# Patient Record
Sex: MALE | Race: WHITE | Employment: OTHER | URBAN - NONMETROPOLITAN AREA
[De-identification: names, ages, dates, MRNs, and addresses within clinical notes are randomized per-mention and may not be internally consistent; named-entity substitution may affect disease eponyms.]

---

## 2018-08-15 ENCOUNTER — HOSPITAL ENCOUNTER (EMERGENCY)
Age: 63
Discharge: HOME | End: 2018-08-15
Attending: EMERGENCY MEDICINE

## 2018-08-15 VITALS
OXYGEN SATURATION: 95 % | SYSTOLIC BLOOD PRESSURE: 190 MMHG | HEIGHT: 68 IN | BODY MASS INDEX: 33.34 KG/M2 | WEIGHT: 220 LBS | HEART RATE: 82 BPM | DIASTOLIC BLOOD PRESSURE: 98 MMHG | RESPIRATION RATE: 14 BRPM | TEMPERATURE: 97.9 F

## 2018-08-15 DIAGNOSIS — L02.91 ABSCESS: Primary | ICD-10-CM

## 2018-08-15 PROCEDURE — 99283 EMERGENCY DEPT VISIT LOW MDM: CPT

## 2018-08-15 PROCEDURE — 87070 CULTURE OTHR SPECIMN AEROBIC: CPT

## 2018-08-15 PROCEDURE — 87205 SMEAR GRAM STAIN: CPT

## 2018-08-15 PROCEDURE — 87077 CULTURE AEROBIC IDENTIFY: CPT

## 2018-08-15 PROCEDURE — 87186 SC STD MICRODIL/AGAR DIL: CPT

## 2018-08-15 PROCEDURE — 4500000023 HC ED LEVEL 3 PROCEDURE

## 2018-08-15 RX ORDER — IBUPROFEN 400 MG/1
400 TABLET ORAL EVERY 6 HOURS PRN
Qty: 30 TABLET | Refills: 0 | Status: ON HOLD | OUTPATIENT
Start: 2018-08-15 | End: 2021-10-19

## 2018-08-15 RX ORDER — SULFAMETHOXAZOLE AND TRIMETHOPRIM 800; 160 MG/1; MG/1
1 TABLET ORAL 2 TIMES DAILY
Qty: 20 TABLET | Refills: 0 | Status: SHIPPED | OUTPATIENT
Start: 2018-08-15 | End: 2018-08-25

## 2018-08-15 ASSESSMENT — PAIN SCALES - GENERAL: PAINLEVEL_OUTOF10: 10

## 2018-08-15 ASSESSMENT — PAIN DESCRIPTION - LOCATION: LOCATION: ABDOMEN

## 2018-08-15 ASSESSMENT — PAIN DESCRIPTION - ORIENTATION: ORIENTATION: LOWER

## 2018-08-15 ASSESSMENT — PAIN DESCRIPTION - DESCRIPTORS: DESCRIPTORS: SHARP

## 2018-08-15 ASSESSMENT — PAIN DESCRIPTION - PAIN TYPE: TYPE: ACUTE PAIN

## 2018-08-15 NOTE — ED PROVIDER NOTES
Emergency Physician Note    Chief Complaint  Abscess (Abscess and cellulitis on lower left abdomin)       History of Present Illness  Tyra Cooper is a 59 y.o. male who presents to the ED for abscess and cellulitis. Patient reports the last few days he's had some pain in the left groin. He's had abscesses or twice in the past.  He denies any fevers, chills or sweats. He also denies any injury to the area. 10 systems reviewed, pertinent positives per HPI otherwise noted to be negative    I have reviewed the following from the nursing documentation:      Prior to Admission medications    Medication Sig Start Date End Date Taking? Authorizing Provider   aspirin 81 MG chewable tablet Take 81 mg by mouth daily. Historical Provider, MD   rosuvastatin (CRESTOR) 40 MG tablet Take 40 mg by mouth every evening. Historical Provider, MD   lisinopril (PRINIVIL;ZESTRIL) 10 MG tablet Take 10 mg by mouth daily. Historical Provider, MD   metoprolol (TOPROL-XL) 25 MG XL tablet Take 25 mg by mouth daily. Historical Provider, MD       Allergies as of 08/15/2018    (No Known Allergies)       Past Medical History:   Diagnosis Date    Hyperlipidemia     Hypertension         Surgical History:   Past Surgical History:   Procedure Laterality Date    ARTERIAL BYPASS SURGRY      triple in 2009        Family History:  History reviewed. No pertinent family history. Social History     Social History    Marital status:      Spouse name: N/A    Number of children: N/A    Years of education: N/A     Occupational History    Not on file. Social History Main Topics    Smoking status: Current Every Day Smoker     Packs/day: 1.50     Types: Cigarettes    Smokeless tobacco: Not on file    Alcohol use No    Drug use: No    Sexual activity: Not on file     Other Topics Concern    Not on file     Social History Narrative    No narrative on file       Nursing notes reviewed.     ED Triage Vitals [08/15/18 Also, there is no evidence or peritonitis, sepsis, or toxicity. [place] Tristan and I have discussed the diagnosis and risks, and we agree with discharging home to follow-up with their primary doctor. We also discussed returning to the Emergency Department immediately if new or worsening symptoms occur. We have discussed the symptoms which are most concerning (e.g., changing or worsening pain, fever, numbness, weakness, cool or painful digits) that necessitate immediate return. Final Impression    1. Abscess        Discharge Vital Signs:  Blood pressure (!) 190/98, pulse 82, temperature 97.9 °F (36.6 °C), temperature source Oral, resp. rate 14, height 5' 8\" (1.727 m), weight 220 lb (99.8 kg), SpO2 95 %. Patient was given scripts for the following medications. I counseled patient how to take these medications. Discharge Medication List as of 8/15/2018  9:28 AM      START taking these medications    Details   sulfamethoxazole-trimethoprim (BACTRIM DS) 800-160 MG per tablet Take 1 tablet by mouth 2 times daily for 10 days, Disp-20 tablet, R-0Print      ibuprofen (ADVIL;MOTRIN) 400 MG tablet Take 1 tablet by mouth every 6 hours as needed for Pain, Disp-30 tablet, R-0Print             Disposition  Pt is in good condition upon Discharge to home. This chart was generated using the [address] [place] [address] [address] dictation system. I created this record but it may contain dictation errors.           Edvin Vasquez MD  08/15/18 3410

## 2018-08-18 LAB
GRAM STAIN RESULT: ABNORMAL
ORGANISM: ABNORMAL
ORGANISM: ABNORMAL
WOUND/ABSCESS: ABNORMAL

## 2021-10-01 ENCOUNTER — HOSPITAL ENCOUNTER (OUTPATIENT)
Dept: VASCULAR LAB | Age: 66
Discharge: HOME | End: 2021-10-01
Payer: MEDICARE

## 2021-10-01 DIAGNOSIS — I73.9 CLAUDICATION OF BOTH LOWER EXTREMITIES (HCC): ICD-10-CM

## 2021-10-01 PROCEDURE — 93923 UPR/LXTR ART STDY 3+ LVLS: CPT

## 2021-10-15 ENCOUNTER — OFFICE VISIT (OUTPATIENT)
Dept: VASCULAR SURGERY | Age: 66
End: 2021-10-15
Payer: MEDICARE

## 2021-10-15 VITALS
HEIGHT: 68 IN | DIASTOLIC BLOOD PRESSURE: 82 MMHG | SYSTOLIC BLOOD PRESSURE: 170 MMHG | WEIGHT: 226 LBS | BODY MASS INDEX: 34.25 KG/M2

## 2021-10-15 DIAGNOSIS — I73.9 SEVERE CLAUDICATION (HCC): Primary | ICD-10-CM

## 2021-10-15 PROCEDURE — G8427 DOCREV CUR MEDS BY ELIG CLIN: HCPCS | Performed by: SURGERY

## 2021-10-15 PROCEDURE — 99203 OFFICE O/P NEW LOW 30 MIN: CPT | Performed by: SURGERY

## 2021-10-15 PROCEDURE — G8484 FLU IMMUNIZE NO ADMIN: HCPCS | Performed by: SURGERY

## 2021-10-15 PROCEDURE — G8417 CALC BMI ABV UP PARAM F/U: HCPCS | Performed by: SURGERY

## 2021-10-16 NOTE — PROGRESS NOTES
Outpatient Consultation / H&P    Date of Consultation:  10/15/2021    PCP:  MARCEL Stafford - CNP     Referring Provider:  Alison Guadalupe     Chief Complaint:   Chief Complaint   Patient presents with    Other     patient is coming in regarding pain in both legs. ref by Alison ROD/angelica        History of Present Illness: We are asked to see this patient in consultation by Alison Guadalupe regarding claudication. Miguel Angel Palomo is a 73 y.o. male who reports long standing bilateral leg pain with ambulation which has gotten progressively worse. He reports can only walk about 200 ft before having to stop due to pain/tightness in bilateral calves. Left worse than right. He is a 2ppd smoker. He underwent CABG 2009. Past Medical History:  Past Medical History:   Diagnosis Date    Hyperlipidemia     Hypertension        Past Surgical History:  Past Surgical History:   Procedure Laterality Date    ARTERIAL BYPASS SURGRY      triple in 2009       Home Medications:   Prior to Admission medications    Medication Sig Start Date End Date Taking? Authorizing Provider   ibuprofen (ADVIL;MOTRIN) 400 MG tablet Take 1 tablet by mouth every 6 hours as needed for Pain 8/15/18  Yes Tevin Dunne MD   aspirin 81 MG chewable tablet Take 81 mg by mouth daily. Yes Historical Provider, MD        Allergies:  Patient has no known allergies.       Social History:      Social History     Socioeconomic History    Marital status:      Spouse name: Not on file    Number of children: Not on file    Years of education: Not on file    Highest education level: Not on file   Occupational History    Not on file   Tobacco Use    Smoking status: Current Every Day Smoker     Packs/day: 2.00     Types: Cigarettes    Smokeless tobacco: Never Used   Substance and Sexual Activity    Alcohol use: No    Drug use: No    Sexual activity: Not on file   Other Topics Concern    Not on file   Social History Narrative    Not on file     Social Determinants of Health     Financial Resource Strain:     Difficulty of Paying Living Expenses:    Food Insecurity:     Worried About Running Out of Food in the Last Year:     [address] in the Last Year:    Transportation Needs:     Lack of Transportation (Medical):  Lack of Transportation (Non-Medical):    Physical Activity:     Days of Exercise per Week:     Minutes of Exercise per Session:    Stress:     Feeling of Stress :    Social Connections:     Frequency of Communication with Friends and Family:     Frequency of Social Gatherings with Friends and Family:     Attends [employer] Services:     Active Member of Clubs or Organizations:     Attends Club or Organization Meetings:     Marital Status:    Intimate Partner Violence:     Fear of Current or Ex-Partner:     Emotionally Abused:     Physically Abused:     Sexually Abused:        Family History:    History reviewed. No pertinent family history. Review of Systems:  A 14 point review of systems was completed. Pertinent positives identified in the HPI, all other review of systems negative. Physical Examination:    BP (!) 170/82 (Site: Left Upper Arm)   Ht 5' 8\" (1.727 m)   Wt 226 lb (102.5 kg)   BMI 34.36 kg/m²     Weight: 226 lb (102.5 kg)       General appearance: NAD  Eyes: PERRLA  Neck: no JVD, no lymphadenopathy. Respiratory: effort is unlabored, no crackles, wheezes or rubs. Cardiovascular: regular, no murmur. No carotid bruits. No edema or varicosities. Pulses:    femoral DP PT   RIGHT 2 doppler doppler   LEFT 2 doppler doppler   GI: abdomen soft, nondistended, no organomegaly. Musculoskeletal: strength and tone normal.  Extremities: warm and pink. Skin: no dermatitis or ulceration. Neuro/psychiatric: grossly intact. MEDICAL DECISION MAKING/TESTING      Lower Extremity arterial exam:       Right Impression   1. The right ankle/brachial index is 0.65.    2. The presence of multiphasic waveforms at the common femoral artery level   suggests no aorto-iliac inflow disease. 3. Pulse volume recordings reveal mildly abnormal waveforms at the high thigh,   low thigh and calf with moderately abnormal waveforms involving the ankle. 4. Continuous wave Doppler of the common femoral and popliteal arteries reveal   multiphasic waveforms with abnormal monophasic waveforms involving the   posterior tibial and dorsalis pedis arteries. Left Impression   1. The left ankle/brachial index is 0.49.   2. The presence of triphasic waveforms at the common femoral artery level   suggests no aorto-iliac inflow disease. 3. Pulse volume recordings reveal mildly abnormal waveforms involving the high   and low thigh with moderately abnormal waveforms involving the calf and ankle. 4. Continuous wave Doppler of the common femoral artery demonstrates   multiphasic flow. The popliteal, dorsalis pedis and posterior tibial arteries   demonstrate abnormal monophasic flow. 5. There are no prior studies for comparison.       Conclusions        Summary        CHANELLE's indicate moderate bilateral arterial occlusive disease.    Pressures and waveforms suggest bilateral superficial femoral artery    stenosis/occlusion.        Signature        ------------------------------------------------------------------    Electronically signed by Yvette Mcfarlane MD (Interpreting    [id number]) on 10/01/2021 at 04:25 PM           Assessment:      Diagnosis Orders   1. Severe claudication (HCC)       Probable bilateral superficial femoral artery occlusions. Recommendations/Plan: Aortogram with BLE angiogram, possible left lower extremity intervention. Patient has lifestyle-limiting claudication of Bila lower extremity. I discussed the natural history of PAD with patient. I reviewed the modifiable risk factors which include smoking, hypertension, dyslipidemia, and diabetes.  I have discussed the options for treatment

## 2021-10-18 ENCOUNTER — TELEPHONE (OUTPATIENT)
Dept: VASCULAR SURGERY | Age: 66
End: 2021-10-18

## 2021-10-19 ENCOUNTER — APPOINTMENT (OUTPATIENT)
Dept: VASCULAR LAB | Age: 66
End: 2021-10-19
Attending: SURGERY
Payer: MEDICARE

## 2021-10-19 ENCOUNTER — HOSPITAL ENCOUNTER (OUTPATIENT)
Dept: CARDIAC CATH/INVASIVE PROCEDURES | Age: 66
Discharge: HOME | End: 2021-10-19
Attending: SURGERY | Admitting: SURGERY
Payer: MEDICARE

## 2021-10-19 VITALS — HEIGHT: 68 IN | WEIGHT: 224.3 LBS | BODY MASS INDEX: 33.99 KG/M2

## 2021-10-19 LAB
ANION GAP SERPL CALCULATED.3IONS-SCNC: 9 MMOL/L (ref 3–16)
BUN BLDV-MCNC: 19 MG/DL (ref 7–20)
CALCIUM SERPL-MCNC: 9.5 MG/DL (ref 8.3–10.6)
CHLORIDE BLD-SCNC: 101 MMOL/L (ref 99–110)
CO2: 30 MMOL/L (ref 21–32)
CREAT SERPL-MCNC: 0.8 MG/DL (ref 0.8–1.3)
EKG ATRIAL RATE: 55 BPM
EKG DIAGNOSIS: NORMAL
EKG P AXIS: 24 DEGREES
EKG P-R INTERVAL: 192 MS
EKG Q-T INTERVAL: 434 MS
EKG QRS DURATION: 114 MS
EKG QTC CALCULATION (BAZETT): 415 MS
EKG R AXIS: -34 DEGREES
EKG T AXIS: 136 DEGREES
EKG VENTRICULAR RATE: 55 BPM
GFR AFRICAN AMERICAN: >60
GFR NON-AFRICAN AMERICAN: >60
GLUCOSE BLD-MCNC: 159 MG/DL (ref 70–99)
HCT VFR BLD CALC: 50.7 % (ref 40.5–52.5)
HEMOGLOBIN: 17.2 G/DL (ref 13.5–17.5)
MCH RBC QN AUTO: 32.2 PG (ref 26–34)
MCHC RBC AUTO-ENTMCNC: 33.9 G/DL (ref 31–36)
MCV RBC AUTO: 95 FL (ref 80–100)
PDW BLD-RTO: 14.7 % (ref 12.4–15.4)
PLATELET # BLD: 236 K/UL (ref 135–450)
PMV BLD AUTO: 6.7 FL (ref 5–10.5)
POTASSIUM SERPL-SCNC: 4.5 MMOL/L (ref 3.5–5.1)
RBC # BLD: 5.34 M/UL (ref 4.2–5.9)
SARS-COV-2: NOT DETECTED
SODIUM BLD-SCNC: 140 MMOL/L (ref 136–145)
WBC # BLD: 7.4 K/UL (ref 4–11)

## 2021-10-19 PROCEDURE — 75625 CONTRAST EXAM ABDOMINL AORTA: CPT | Performed by: SURGERY

## 2021-10-19 PROCEDURE — 93010 ELECTROCARDIOGRAM REPORT: CPT | Performed by: INTERNAL MEDICINE

## 2021-10-19 PROCEDURE — 93971 EXTREMITY STUDY: CPT

## 2021-10-19 PROCEDURE — 2500000003 HC RX 250 WO HCPCS

## 2021-10-19 PROCEDURE — 75625 CONTRAST EXAM ABDOMINL AORTA: CPT

## 2021-10-19 PROCEDURE — C1769 GUIDE WIRE: HCPCS

## 2021-10-19 PROCEDURE — 36200 PLACE CATHETER IN AORTA: CPT | Performed by: SURGERY

## 2021-10-19 PROCEDURE — 2709999900 HC NON-CHARGEABLE SUPPLY

## 2021-10-19 PROCEDURE — 2709999900 HC NON-CHARGEABLE SUPPLY: Performed by: SURGERY

## 2021-10-19 PROCEDURE — 75716 ARTERY X-RAYS ARMS/LEGS: CPT | Performed by: SURGERY

## 2021-10-19 PROCEDURE — C1760 CLOSURE DEV, VASC: HCPCS

## 2021-10-19 PROCEDURE — 6360000002 HC RX W HCPCS

## 2021-10-19 PROCEDURE — 93005 ELECTROCARDIOGRAM TRACING: CPT | Performed by: SURGERY

## 2021-10-19 PROCEDURE — U0003 INFECTIOUS AGENT DETECTION BY NUCLEIC ACID (DNA OR RNA); SEVERE ACUTE RESPIRATORY SYNDROME CORONAVIRUS 2 (SARS-COV-2) (CORONAVIRUS DISEASE [COVID-19]), AMPLIFIED PROBE TECHNIQUE, MAKING USE OF HIGH THROUGHPUT TECHNOLOGIES AS DESCRIBED BY CMS-2020-01-R: HCPCS

## 2021-10-19 PROCEDURE — 36200 PLACE CATHETER IN AORTA: CPT

## 2021-10-19 PROCEDURE — 80048 BASIC METABOLIC PNL TOTAL CA: CPT

## 2021-10-19 PROCEDURE — 99152 MOD SED SAME PHYS/QHP 5/>YRS: CPT | Performed by: SURGERY

## 2021-10-19 PROCEDURE — G0269 OCCLUSIVE DEVICE IN VEIN ART: HCPCS

## 2021-10-19 PROCEDURE — 85027 COMPLETE CBC AUTOMATED: CPT

## 2021-10-19 PROCEDURE — 75716 ARTERY X-RAYS ARMS/LEGS: CPT

## 2021-10-19 PROCEDURE — 76937 US GUIDE VASCULAR ACCESS: CPT | Performed by: SURGERY

## 2021-10-19 PROCEDURE — U0005 INFEC AGEN DETEC AMPLI PROBE: HCPCS

## 2021-10-19 RX ORDER — SODIUM CHLORIDE 9 MG/ML
INJECTION, SOLUTION INTRAVENOUS CONTINUOUS
Status: DISCONTINUED | OUTPATIENT
Start: 2021-10-19 | End: 2021-10-19 | Stop reason: HOSPADM

## 2021-10-19 RX ORDER — MIDAZOLAM HYDROCHLORIDE 5 MG/ML
INJECTION INTRAMUSCULAR; INTRAVENOUS
Status: COMPLETED | OUTPATIENT
Start: 2021-10-19 | End: 2021-10-19

## 2021-10-19 RX ORDER — FENTANYL CITRATE 50 UG/ML
INJECTION, SOLUTION INTRAMUSCULAR; INTRAVENOUS
Status: COMPLETED | OUTPATIENT
Start: 2021-10-19 | End: 2021-10-19

## 2021-10-19 RX ADMIN — FENTANYL CITRATE 50 MCG: 50 INJECTION, SOLUTION INTRAMUSCULAR; INTRAVENOUS at 11:37

## 2021-10-19 RX ADMIN — MIDAZOLAM HYDROCHLORIDE 2 MG: 5 INJECTION INTRAMUSCULAR; INTRAVENOUS at 11:37

## 2021-10-19 NOTE — H&P
History and Physical / Pre-Sedation Assessment    Patient:  Rika Armenta  :   1955     Intended Procedure: Aortogram with BLE angiogram possible LLE intervention      HPI: Severe bilateral claudication  Nurses notes reviewed and agreed. Medications reviewed  Allergies:   Patient has no known allergies. Physical Exam:   CURRENT VITALS:  Ht 5' 8\" (1.727 m)   Wt 224 lb 4.8 oz (101.7 kg)   BMI 34.10 kg/m²   Airway:Normal  Cardiac:Normal  Pulmonary:Normal  Abdomen:Normal        Pre-Procedure Assessment/Plan:  ASA 2 - Patient with mild systemic disease with no functional limitations    Mallampati Airway Assessment:  Mallampati Class II - (soft palate, fauces & uvula are visible)    Level of Sedation Plan: Moderate sedation    Post Procedure plan: Return to same level of care    I assessed the patient and find that the patient is in satisfactory condition to proceed with the planned procedure and sedation plan. I have explained the risk, benefits, and alternatives to the procedure. The patient understands and agrees to proceed.   Yes    Taylor Reddy

## 2021-10-19 NOTE — OP NOTE
[place] [address], [address] [facility] [address]                                OPERATIVE REPORT    PATIENT NAME: Nora Anglin                   :        1955  MED REC NO:   [id number]                          ROOM:  ACCOUNT NO:   [de-identified]                           ADMIT DATE: 10/19/2021  PROVIDER:     Temo Webb MD    DATE OF PROCEDURE:  10/19/2021    ANGIOGRAM PROCEDURE REPORT    PREOPERATIVE DIAGNOSIS:  Severe bilateral lower extremity claudication. POSTOPERATIVE DIAGNOSIS:  Severe bilateral lower extremity claudication. PROCEDURES PERFORMED:  1. Ultrasound-guided right femoral artery access. 2.  Insertion of catheter in the aorta. 3.  Abdominal aortogram.  4.  Bilateral lower extremity angiograms. SURGEON:  Temo Webb MD    ANESTHESIA:  Local with moderate monitored sedation. INDICATIONS:  The patient is a 61-year-old male with longstanding  worsening claudication, now very disabling. The patient is brought to  the angio suite at this time to undergo angiography with possible  intervention. PROCEDURE:  The patient was brought to the angio suite, placed in the  supine position. Under my direct supervision Versed and fentanyl were  administered for moderate sedation. The patient was monitored by an  independent trained nurse observer using continuous blood pressure, EKG  and pulse oximetry. I spent approximately 20 minutes face-to-face with  the patient providing and directing sedation. The right femoral region  was prepped and draped in sterile fashion. An ultrasound was used to  identify the right common femoral bifurcation. The common femoral  artery was noted be patent and pulsatile. The common femoral artery was  accessed in a retrograde fashion and 5-Micronesian introducer sheath was  placed. A copy of the ultrasound image was saved and placed in the  patient's record.   Through the introducer sheath, a Bentson wire and a  modified hook catheter were advanced into the abdominal aorta. Abdominal aortogram was then obtained. The catheter was then pulled on  just above the aortic bifurcation. Oblique iliofemoral angiograms were  obtained. Using a bolus shree technique the bilateral lower extremity  angiography was then performed. The catheter was then removed over a  Bentson wire. Retrograde right femoral angiogram was performed and a  5-Honduran Mynx closure device was placed deployed in standard fashion  achieving excellent hemostasis. Estimated blood loss throughout the  procedure was minimal.  The patient was transferred to the recovery area  in stable condition. ANGIOGRAPHIC FINDINGS:  The abdominal angiogram demonstrates patent  celiac superior mesenteric and bilateral renal arteries. There is some  infrarenal atherosclerotic disease and what appears to be a small  abdominal aortic aneurysm. Bilateral iliac arteries are patent, but  diseased. On the right, the common femoral artery and deep femoral  artery are patent. There is a flush occlusion of the superficial  femoral artery, which reconstitutes via profunda collaterals below the  adductor canal.  There is two-vessel runoff below the knee. On the  right side, the popliteal artery that is patent appears very irregular. On the left side, the common femoral artery is patent. The deep femoral  artery is patent. There is patent proximal superficial femoral artery  with multiple areas of high-grade stenosis and an abrupt occlusion below  the adductor canal.  There is reconstitution of below-knee popliteal  segment and then runoff into the anterior tibial artery. The posterior  tibial artery also reconstitutes distally via collaterals and then fills  antegrade and retrograde. 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   This will be studied further in the recovery  area with duplex ultrasonography. At the conclusion of this procedure,  the patient was transferred to the recovery area in stable condition  after removal of the catheters and wires and placement of a Mynx closure  device.         Kathya Riggs MD    D: 10/19/2021 13:04:48       T: 10/19/2021 13:07:09     [email or web address]  Job#: [id number]     Doc#: [id number]    CC:

## 2021-10-20 NOTE — PROGRESS NOTES
[facility]   Cardiovascular Evaluation    PATIENT: Jessie Guzmán  DATE: 10/21/2021  MRN: <[id number]>  CSN: [id number]  : 1955      Primary Care Doctor: MARCEL Rucker CNP  Reason for evaluation:   New Patient  abnormal EKG    Subjective:   History of present illness on initial date of evaluation:   Jessie Guzmán is a 73 y.o. patient who presents as a new patient referral from Dr. Cecilia Sanchez for an abnormal EKG and preoperative cardiac risk assessment for vascular surgery with Dr. Cecilia Sanchez 10/24/2021. Patient has a past medical history of coronary artery disease, hypertension, and hyperlipidemia. Noted in patient's chart that he had CABG surgery years ago but no records available. On 10/19/2021 patient underwent Aortogram with BLE angio which showed patient will need bypass surgery. Today he states his CABG surgery was possible in  or . At the time of his MI he has having chest pain which felt like a pressure which he has not had since. He states he has not been following with cardiology due to a lack of insurance. He is currently a daily smoker and had tried chantix in the past. He states he is not active due to his pain in his legs. Patient currently denies any weight gain, edema, palpitations, chest pain, shortness of breath, dizziness, and syncope. Patient Active Problem List   Diagnosis    Severe claudication ([address]    Preop cardiovascular exam    Abnormal EKG         Past Medical History:   has a past medical history of CAD (coronary artery disease), Hyperlipidemia, Hypertension, and Thyroid disease. Surgical History:   has a past surgical history that includes Arterial bypass surgry and Cardiac surgery (). Social History:   reports that he has been smoking cigarettes. He has been smoking about 2.00 packs per day. He has never used smokeless tobacco. He reports current drug use. Drug: Marijuana. He reports that he does not drink alcohol. Family History:  No evidence for sudden cardiac death or premature CAD    Home Medications:  Reviewed and are listed in nursing record. and/or listed below  Current Outpatient Medications   Medication Sig Dispense Refill    aspirin 81 MG chewable tablet Take 81 mg by mouth daily. No current facility-administered medications for this visit. Allergies:  Patient has no known allergies. Review of Systems:   A 14 point review of symptoms completed. Pertinent positives identified in the HPI, all other review of symptoms negative as below.     Objective:   PHYSICAL EXAM:    Vitals:    10/21/21 1412   BP: (!) 160/88   Pulse:    SpO2:     Weight: 226 lb (102.5 kg)     Wt Readings from Last 3 Encounters:   10/20/21 224 lb (101.6 kg)   10/21/21 226 lb (102.5 kg)   10/19/21 224 lb 4.8 oz (101.7 kg)         General Appearance:  Alert, cooperative, no distress, appears stated age   Head:  Normocephalic, atraumatic   Eyes:  PERRL, conjunctiva/corneas clear   Nose: Nares normal, no drainage or sinus tenderness   Throat: Lips, mucosa, and tongue normal   Neck: Supple, symmetrical, trachea midline, NL thyroid no carotid bruit or JVD   Lungs:   CTAB, respirations unlabored   Chest Wall:  No tenderness or deformity   Heart:  Regular rhythm and normal rate; S1, S2 are normal;   no murmur noted; no rub or gallop   Abdomen:   Soft, non-tender, +BS x 4, no masses, no organomegaly   Extremities: Extremities normal, atraumatic, no cyanosis or edema   Pulses: 2+ and symmetric   Skin: Skin color, texture, turgor normal, no rashes or lesions   Pysch: Normal mood and affect   Neurologic: Normal gross motor and sensory exam.         LABS   CBC:      Lab Results   Component Value Date    WBC 7.4 10/19/2021    RBC 5.34 10/19/2021    HGB 17.2 10/19/2021    HCT 50.7 10/19/2021    MCV 95.0 10/19/2021    RDW 14.7 10/19/2021     10/19/2021     CMP:  Lab Results   Component Value Date     10/19/2021    K 4.5 10/19/2021  10/19/2021    CO2 30 10/19/2021    BUN 19 10/19/2021    CREATININE 0.8 10/19/2021    GFRAA >60 10/19/2021    LABGLOM >60 10/19/2021    GLUCOSE 159 10/19/2021    PROT 7.2 09/17/2010    CALCIUM 9.5 10/19/2021    BILITOT 0.30 09/17/2010    ALKPHOS 56 09/17/2010    AST 23 09/17/2010    ALT 24 09/17/2010     PT/INR:   No results found for: PTINR  Liver:  No components found for: CHLPL  Lab Results   Component Value Date    ALT 24 09/17/2010    AST 23 09/17/2010    ALKPHOS 56 09/17/2010    BILITOT 0.30 09/17/2010     No results found for: LABA1C  Lipids:         Lab Results   Component Value Date    TRIG 125 09/17/2010    TRIG 118 03/09/2010    TRIG 127 12/07/2009            Lab Results   Component Value Date    HDL 33 (L) 09/17/2010    HDL 33 (L) 03/09/2010    HDL 32 (L) 12/07/2009            Lab Results   Component Value Date    LDLCALC 65 09/17/2010    LDLCALC 69 03/09/2010    LDLCALC 87 12/07/2009            Lab Results   Component Value Date    LABVLDL 25 09/17/2010    LABVLDL 24 03/09/2010    LABVLDL 25 12/07/2009         CARDIAC DATA   EKG: 10/19/2021  Sinus bradycardia with sinus arrhythmia, Left axis deviation  ST & T wave abnormality, consider anterolateral ischemia    ECHO/MUGA:    STRESS TEST:    CARDIAC CATH:    VASCULAR/OTHER IMAGING:      Assessment and Plan   Aleja Buenrostro is a 73 y.o. male who presents today for the following problems:      1. Preop: Lower extremity vascular bypass  2. CAD   - CABG >10yrs ago. No data available  3. PAD: severe  4. Tobacco abuse  5. Noncompliance: Due to lack of insurance and finances          MD Plan:  1. Patient presents for cardiac clearance for high risk noncardiac surgery but is not had cardiology or medical care for 10+ years due to lack of insurance. He currently has no angina but is not active with less than 4 METS due to severe claudication symptoms. Without data and history he is extremely high risk to proceed at this time  2.   This avi with patient would like to better risk management stratify he is okay with a temporary delay in his surgery  3. Continue aspirin, start Crestor 20 mg, Toprol 25 mg p.o. twice daily   -Call if intolerant to medicine changes and discussed with patient   -Goal resting heart rate less than 65 more than 1 week prior to surgery  4. Sent for Deyanira Myoview stress test.  No treadmill due to severe PAD  5. Echocardiogram  6. Update lipids    We will wait for above testing to result if negative he may proceed with low risk if abnormal will need discussed with patient best plan to reduce his cardiovascular risk      Patient Active Problem List   Diagnosis    Severe claudication ([facility] [address]    Preop cardiovascular exam    Abnormal EKG       Patient Plan:  1. Recommend delaying surgery at this time until testing is completed   2. Labs- fasting lipids   3. Recommend starting Crestor 20 mg daily   4. Recommend a chemical stress test due to abnormal EKG and history of CABG surgery   5. Start Toprol XL 25 mg daily   6. Echocardiogram to evaluate heart function   7. Monitor blood pressure at home and keep a log with dates and times. Bring to next visit     This note was scribed in the presence of Dr. Carrol Miranda M.D. By Carrol Schmitz MD, personally performed the services described in this documentation as scribed by the above signed scribe in my presence, and it is both accurate and complete to the best of our ability and knowledge. I agree with the details independently gathered by my clinical support staff, while the remaining scribed note accurately describes my personal service to the patient. The above RN is working as a scribe for and in the presence of myself . Working as a scribe, the RN may have prepopulated components of this note with my historical intellectual property under my direct supervision. Any additions to this intellectual property were performed at my direction.   Furthermore, the content and accuracy of this note have been reviewed by me to the best of my ability.        Christina Díaz MD, [address] [facility] [employer] Cardiologist  [address]  [phone] [facility]  [phone] [address] [place] [address]

## 2021-10-21 ENCOUNTER — OFFICE VISIT (OUTPATIENT)
Dept: CARDIOLOGY CLINIC | Age: 66
End: 2021-10-21
Payer: MEDICARE

## 2021-10-21 VITALS
HEART RATE: 70 BPM | BODY MASS INDEX: 34.25 KG/M2 | SYSTOLIC BLOOD PRESSURE: 160 MMHG | OXYGEN SATURATION: 96 % | HEIGHT: 68 IN | DIASTOLIC BLOOD PRESSURE: 88 MMHG | WEIGHT: 226 LBS

## 2021-10-21 DIAGNOSIS — I25.10 CORONARY ARTERY DISEASE WITH HISTORY OF MYOCARDIAL INFARCTION WITHOUT HISTORY OF CABG: Primary | ICD-10-CM

## 2021-10-21 DIAGNOSIS — Z01.810 PREOP CARDIOVASCULAR EXAM: ICD-10-CM

## 2021-10-21 DIAGNOSIS — I25.2 CORONARY ARTERY DISEASE WITH HISTORY OF MYOCARDIAL INFARCTION WITHOUT HISTORY OF CABG: Primary | ICD-10-CM

## 2021-10-21 DIAGNOSIS — Z72.0 TOBACCO ABUSE: ICD-10-CM

## 2021-10-21 DIAGNOSIS — R94.31 ABNORMAL EKG: ICD-10-CM

## 2021-10-21 DIAGNOSIS — I73.9 PAD (PERIPHERAL ARTERY DISEASE) (HCC): ICD-10-CM

## 2021-10-21 PROCEDURE — G8484 FLU IMMUNIZE NO ADMIN: HCPCS | Performed by: INTERNAL MEDICINE

## 2021-10-21 PROCEDURE — G8417 CALC BMI ABV UP PARAM F/U: HCPCS | Performed by: INTERNAL MEDICINE

## 2021-10-21 PROCEDURE — 4004F PT TOBACCO SCREEN RCVD TLK: CPT | Performed by: INTERNAL MEDICINE

## 2021-10-21 PROCEDURE — 1123F ACP DISCUSS/DSCN MKR DOCD: CPT | Performed by: INTERNAL MEDICINE

## 2021-10-21 PROCEDURE — 4040F PNEUMOC VAC/ADMIN/RCVD: CPT | Performed by: INTERNAL MEDICINE

## 2021-10-21 PROCEDURE — G8427 DOCREV CUR MEDS BY ELIG CLIN: HCPCS | Performed by: INTERNAL MEDICINE

## 2021-10-21 PROCEDURE — 3017F COLORECTAL CA SCREEN DOC REV: CPT | Performed by: INTERNAL MEDICINE

## 2021-10-21 PROCEDURE — 99204 OFFICE O/P NEW MOD 45 MIN: CPT | Performed by: INTERNAL MEDICINE

## 2021-10-21 RX ORDER — METOPROLOL SUCCINATE 25 MG/1
25 TABLET, EXTENDED RELEASE ORAL DAILY
Qty: 30 TABLET | Refills: 3 | Status: SHIPPED | OUTPATIENT
Start: 2021-10-21 | End: 2022-01-03 | Stop reason: SDUPTHER

## 2021-10-21 RX ORDER — ROSUVASTATIN CALCIUM 20 MG/1
20 TABLET, COATED ORAL DAILY
Qty: 30 TABLET | Refills: 5 | Status: SHIPPED | OUTPATIENT
Start: 2021-10-21 | End: 2022-04-22

## 2021-10-21 NOTE — Clinical Note
I saw this gentleman today for preoperative assessment prior to surgery with you October 25 for a left femoral to pop bypass. He has an extensive cardiac history but untreated for the last 10 years. I will go ahead and work him up but he will need to have his surgery delayed until after testing.     Just want to send you an [facility]

## 2021-10-21 NOTE — LETTER
[facility]   Cardiovascular Evaluation    PATIENT: Xin Marroquin  DATE: 10/21/2021  MRN: <[id number]>  CSN: [id number]  : 1955      Primary Care Doctor: MARCEL Hwang - CNP  Reason for evaluation:   New Patient  abnormal EKG    Subjective:   History of present illness on initial date of evaluation:   Xin Marroquin is a 73 y.o. patient who presents as a new patient referral from Dr. Maryann Quach for an abnormal EKG and preoperative cardiac risk assessment for vascular surgery with Dr. Maryann Quach 10/24/2021. Patient has a past medical history of coronary artery disease, hypertension, and hyperlipidemia. Noted in patient's chart that he had CABG surgery years ago but no records available. On 10/19/2021 patient underwent Aortogram with BLE angio which showed patient will need bypass surgery. Today he states his CABG surgery was possible in  or . At the time of his MI he has having chest pain which felt like a pressure which he has not had since. He states he has not been following with cardiology due to a lack of insurance. He is currently a daily smoker and had tried chantix in the past. He states he is not active due to his pain in his legs. Patient currently denies any weight gain, edema, palpitations, chest pain, shortness of breath, dizziness, and syncope. Patient Active Problem List   Diagnosis    Severe claudication ([address]    Preop cardiovascular exam    Abnormal EKG         Past Medical History:   has a past medical history of CAD (coronary artery disease), Hyperlipidemia, Hypertension, and Thyroid disease. Surgical History:   has a past surgical history that includes Arterial bypass surgry and Cardiac surgery (). Social History:   reports that he has been smoking cigarettes. He has been smoking about 2.00 packs per day. He has never used smokeless tobacco. He reports current drug use. Drug: Marijuana. He reports that he does not drink alcohol. Family History:  No evidence for sudden cardiac death or premature CAD    Home Medications:  Reviewed and are listed in nursing record. and/or listed below  Current Outpatient Medications   Medication Sig Dispense Refill    aspirin 81 MG chewable tablet Take 81 mg by mouth daily. No current facility-administered medications for this visit. Allergies:  Patient has no known allergies. Review of Systems:   A 14 point review of symptoms completed. Pertinent positives identified in the HPI, all other review of symptoms negative as below.     Objective:   PHYSICAL EXAM:    Vitals:    10/21/21 1412   BP: (!) 160/88   Pulse:    SpO2:     Weight: 226 lb (102.5 kg)     Wt Readings from Last 3 Encounters:   10/20/21 224 lb (101.6 kg)   10/21/21 226 lb (102.5 kg)   10/19/21 224 lb 4.8 oz (101.7 kg)         General Appearance:  Alert, cooperative, no distress, appears stated age   Head:  Normocephalic, atraumatic   Eyes:  PERRL, conjunctiva/corneas clear   Nose: Nares normal, no drainage or sinus tenderness   Throat: Lips, mucosa, and tongue normal   Neck: Supple, symmetrical, trachea midline, NL thyroid no carotid bruit or JVD   Lungs:   CTAB, respirations unlabored   Chest Wall:  No tenderness or deformity   Heart:  Regular rhythm and normal rate; S1, S2 are normal;   no murmur noted; no rub or gallop   Abdomen:   Soft, non-tender, +BS x 4, no masses, no organomegaly   Extremities: Extremities normal, atraumatic, no cyanosis or edema   Pulses: 2+ and symmetric   Skin: Skin color, texture, turgor normal, no rashes or lesions   Pysch: Normal mood and affect   Neurologic: Normal gross motor and sensory exam.         LABS   CBC:      Lab Results   Component Value Date    WBC 7.4 10/19/2021    RBC 5.34 10/19/2021    HGB 17.2 10/19/2021    HCT 50.7 10/19/2021    MCV 95.0 10/19/2021    RDW 14.7 10/19/2021     10/19/2021     CMP:  Lab Results   Component Value Date     10/19/2021    K 4.5 10/19/2021  10/19/2021    CO2 30 10/19/2021    BUN 19 10/19/2021    CREATININE 0.8 10/19/2021    GFRAA >60 10/19/2021    LABGLOM >60 10/19/2021    GLUCOSE 159 10/19/2021    PROT 7.2 09/17/2010    CALCIUM 9.5 10/19/2021    BILITOT 0.30 09/17/2010    ALKPHOS 56 09/17/2010    AST 23 09/17/2010    ALT 24 09/17/2010     PT/INR:   No results found for: PTINR  Liver:  No components found for: CHLPL  Lab Results   Component Value Date    ALT 24 09/17/2010    AST 23 09/17/2010    ALKPHOS 56 09/17/2010    BILITOT 0.30 09/17/2010     No results found for: LABA1C  Lipids:         Lab Results   Component Value Date    TRIG 125 09/17/2010    TRIG 118 03/09/2010    TRIG 127 12/07/2009            Lab Results   Component Value Date    HDL 33 (L) 09/17/2010    HDL 33 (L) 03/09/2010    HDL 32 (L) 12/07/2009            Lab Results   Component Value Date    LDLCALC 65 09/17/2010    LDLCALC 69 03/09/2010    LDLCALC 87 12/07/2009            Lab Results   Component Value Date    LABVLDL 25 09/17/2010    LABVLDL 24 03/09/2010    LABVLDL 25 12/07/2009         CARDIAC DATA   EKG: 10/19/2021  Sinus bradycardia with sinus arrhythmia, Left axis deviation  ST & T wave abnormality, consider anterolateral ischemia    ECHO/MUGA:    STRESS TEST:    CARDIAC CATH:    VASCULAR/OTHER IMAGING:      Assessment and Plan   Max Wilson is a 73 y.o. male who presents today for the following problems:      1. Preop: Lower extremity vascular bypass  2. CAD   - CABG >10yrs ago. No data available  3. PAD: severe  4. Tobacco abuse  5. Noncompliance: Due to lack of insurance and finances          MD Plan:  1. Patient presents for cardiac clearance for high risk noncardiac surgery but is not had cardiology or medical care for 10+ years due to lack of insurance. He currently has no angina but is not active with less than 4 METS due to severe claudication symptoms. Without data and history he is extremely high risk to proceed at this time  2.   This avi with patient would like to better risk management stratify he is okay with a temporary delay in his surgery  3. Continue aspirin, start Crestor 20 mg, Toprol 25 mg p.o. twice daily   -Call if intolerant to medicine changes and discussed with patient   -Goal resting heart rate less than 65 more than 1 week prior to surgery  4. Sent for Deyanira Myoview stress test.  No treadmill due to severe PAD  5. Echocardiogram  6. Update lipids    We will wait for above testing to result if negative he may proceed with low risk if abnormal will need discussed with patient best plan to reduce his cardiovascular risk      Patient Active Problem List   Diagnosis    Severe claudication ([facility] [address]    Preop cardiovascular exam    Abnormal EKG       Patient Plan:  1. Recommend delaying surgery at this time until testing is completed   2. Labs- fasting lipids   3. Recommend starting Crestor 20 mg daily   4. Recommend a chemical stress test due to abnormal EKG and history of CABG surgery   5. Start Toprol XL 25 mg daily   6. Echocardiogram to evaluate heart function   7. Monitor blood pressure at home and keep a log with dates and times. Bring to next visit     This note was scribed in the presence of Dr. Alejandra Menendez M.D. By Alejandra Sidhu MD, personally performed the services described in this documentation as scribed by the above signed scribe in my presence, and it is both accurate and complete to the best of our ability and knowledge. I agree with the details independently gathered by my clinical support staff, while the remaining scribed note accurately describes my personal service to the patient. The above RN is working as a scribe for and in the presence of myself . Working as a scribe, the RN may have prepopulated components of this note with my historical intellectual property under my direct supervision. Any additions to this intellectual property were performed at my direction.   Furthermore, the content and accuracy of this note have been reviewed by me to the best of my ability.        Jodi Workman MD, [address] [facility] Cardiologist  [address]  [phone] [facility]  [phone] [address] [place] [address]

## 2021-10-21 NOTE — PATIENT INSTRUCTIONS
Patient Plan:  1. Recommend delaying surgery at this time until testing is completed   2. Labs- fasting lipids   3. Recommend starting Crestor 20 mg daily   4. Recommend a chemical stress test due to abnormal EKG and history of CABG surgery   5. Start Toprol XL 25 mg daily   6. Echocardiogram to evaluate heart function   7. Monitor blood pressure at home and keep a log with dates and times. Bring to next visit   Your provider has ordered testing for further evaluation. An order/prescription has been included in your paper work.  To schedule outpatient testing, contact Central Scheduling by calling [address] [place] [address] ([phone]).

## 2021-10-29 ENCOUNTER — HOSPITAL ENCOUNTER (OUTPATIENT)
Dept: NUCLEAR MEDICINE | Age: 66
Discharge: HOME | End: 2021-10-29
Payer: MEDICARE

## 2021-10-29 ENCOUNTER — HOSPITAL ENCOUNTER (OUTPATIENT)
Dept: NON INVASIVE DIAGNOSTICS | Age: 66
Discharge: HOME | End: 2021-10-29
Payer: MEDICARE

## 2021-10-29 DIAGNOSIS — I25.2 CORONARY ARTERY DISEASE WITH HISTORY OF MYOCARDIAL INFARCTION WITHOUT HISTORY OF CABG: ICD-10-CM

## 2021-10-29 DIAGNOSIS — I25.10 CORONARY ARTERY DISEASE WITH HISTORY OF MYOCARDIAL INFARCTION WITHOUT HISTORY OF CABG: ICD-10-CM

## 2021-10-29 DIAGNOSIS — Z01.810 PREOP CARDIOVASCULAR EXAM: ICD-10-CM

## 2021-10-29 DIAGNOSIS — R94.31 ABNORMAL EKG: ICD-10-CM

## 2021-10-29 LAB
LV EF: 39 %
LVEF MODALITY: NORMAL

## 2021-10-29 PROCEDURE — 93017 CV STRESS TEST TRACING ONLY: CPT

## 2021-10-29 PROCEDURE — A9502 TC99M TETROFOSMIN: HCPCS | Performed by: INTERNAL MEDICINE

## 2021-10-29 PROCEDURE — 6360000002 HC RX W HCPCS: Performed by: INTERNAL MEDICINE

## 2021-10-29 PROCEDURE — 3430000000 HC RX DIAGNOSTIC RADIOPHARMACEUTICAL: Performed by: INTERNAL MEDICINE

## 2021-10-29 PROCEDURE — 78452 HT MUSCLE IMAGE SPECT MULT: CPT

## 2021-10-29 RX ADMIN — REGADENOSON 0.4 MG: 0.08 INJECTION, SOLUTION INTRAVENOUS at 09:19

## 2021-10-29 RX ADMIN — TETROFOSMIN 11.1 MILLICURIE: 1.38 INJECTION, POWDER, LYOPHILIZED, FOR SOLUTION INTRAVENOUS at 08:12

## 2021-10-29 RX ADMIN — TETROFOSMIN 34.2 MILLICURIE: 1.38 INJECTION, POWDER, LYOPHILIZED, FOR SOLUTION INTRAVENOUS at 09:05

## 2021-11-01 ENCOUNTER — HOSPITAL ENCOUNTER (OUTPATIENT)
Dept: NON INVASIVE DIAGNOSTICS | Age: 66
Discharge: HOME | End: 2021-11-01
Payer: MEDICARE

## 2021-11-01 DIAGNOSIS — I25.2 CORONARY ARTERY DISEASE WITH HISTORY OF MYOCARDIAL INFARCTION WITHOUT HISTORY OF CABG: ICD-10-CM

## 2021-11-01 DIAGNOSIS — I25.10 CORONARY ARTERY DISEASE WITH HISTORY OF MYOCARDIAL INFARCTION WITHOUT HISTORY OF CABG: ICD-10-CM

## 2021-11-01 DIAGNOSIS — R94.31 ABNORMAL EKG: ICD-10-CM

## 2021-11-01 DIAGNOSIS — Z01.810 PREOP CARDIOVASCULAR EXAM: ICD-10-CM

## 2021-11-01 LAB
LV EF: 38 %
LVEF MODALITY: NORMAL

## 2021-11-01 PROCEDURE — 93306 TTE W/DOPPLER COMPLETE: CPT

## 2021-11-02 ENCOUNTER — TELEPHONE (OUTPATIENT)
Dept: CARDIOLOGY CLINIC | Age: 66
End: 2021-11-02

## 2021-11-02 NOTE — TELEPHONE ENCOUNTER
Spoke to pt and relayed JJP'S message. V/U. Pt is agreeable to scheduling [facility]. Please call to schedule.  Pt can be reached @ [phone]

## 2021-11-02 NOTE — TELEPHONE ENCOUNTER
----- Message from Vicki Daniels MD sent at 10/29/2021  4:19 PM EDT -----  Let patient know that his stress test came back abnormal suggesting that his heart function is weak and has significant blockages that are potentially new. I did speak with Dr. Liane Duverney who feels concerned about these results as MI. Let patient know I like to proceed with a left heart cath to evaluate his coronary arteries and bypass.   I would also like an echocardiogram.  See if we can set this up for next week so the patient can get his surgery Dr. Liane Duverney

## 2021-11-03 ENCOUNTER — TELEPHONE (OUTPATIENT)
Dept: CARDIOLOGY CLINIC | Age: 66
End: 2021-11-03

## 2021-11-03 ENCOUNTER — HOSPITAL ENCOUNTER (OUTPATIENT)
Dept: NEUROLOGY | Age: 66
Discharge: HOME | End: 2021-11-03
Payer: MEDICARE

## 2021-11-03 DIAGNOSIS — R20.0 NUMBNESS AND TINGLING OF BOTH FEET: ICD-10-CM

## 2021-11-03 DIAGNOSIS — R20.2 NUMBNESS AND TINGLING OF BOTH FEET: ICD-10-CM

## 2021-11-03 PROCEDURE — 95886 MUSC TEST DONE W/N TEST COMP: CPT

## 2021-11-03 PROCEDURE — 95909 NRV CNDJ TST 5-6 STUDIES: CPT

## 2021-11-03 NOTE — PROCEDURES
Test Date:  11/3/2021    Patient: Jill Fabry : 1955 Physician: Tim Galicia DO   Sex: Male ID#:  Ref Phys: Andres Tran APRN-CNP     Patient Complaints:  Patient is a 73year-old male who presents with numbness tingling pain radiating to the lower extremities over past 6 yrs    Patient History / Exam:  PMH no endocrine disease no back or leg surgery PE reflexes trace, normal strength     NCV & EMG Findings:  Evaluation of the left fibular (EDB) motor and the right tibial (AHB) motor nerves showed decreased conduction velocity (L34, R37 m/s). The right fibular (EDB) motor nerve showed reduced amplitude (1.66 mV) and decreased conduction velocity (38 m/s). The left sural sensory and the right sural sensory nerves showed no response. All examined muscles (as indicated in the following table) showed no evidence of electrical instability. Impression:  Study is consistent with mild sensorimotor peripheral neuropathy. No evidence of an acute radiculopathy or other lower motor neuron dysfunction. Thank you.          Tim Galicia DO        Nerve Conduction Studies  Motor Nerve Results      Latency Amplitude F-Lat Segment Distance CV Comment   Site (ms) Norm (mV) Norm (ms)  (cm) (m/s) Norm    Left Fibular (EDB) Motor   Ankle 4.9  < 6.1 2.3  > 2.0         Bel Fib Head 14.4 - 2.1 -  Bel Fib Head-Ankle 32 34  > 38    Right Fibular (EDB) Motor   Ankle 4.8  < 6.1 1.66  > 2.0         Bel Fib Head 14.3 - 1.73 -  Bel Fib Head-Ankle 36 38  > 38    Right Tibial (AHB) Motor   Ankle 6.0  < 6.1 8.0  > 4.4         Knee 15.0 - 7.2 -  Knee-Ankle 33 37  > 39      Sensory Nerve Results      Latency (Peak) Amplitude (P-P) Segment Distance CV Comment   Site (ms) Norm (µV) Norm  (cm) (m/s) Norm    Left Sural Sensory   Calf-Lat Mall NR  < 4.0 NR  > 5 Calf-Lat Mall 14 NR  > 35    Right Sural Sensory   Calf-Lat Mall NR  < 4.0 NR  > 5 Calf-Lat Mall 14 NR  > 35        Electromyography     Side Muscle Nerve Root Ins Act Fibs Psw Amp Dur Poly Recrt Int Lety President Comment   Right Gluteus Med Sup Gluteal L5-S1 Nml Nml Nml Nml Nml 0 Nml Nml    Right Vastus Med Femoral L2-L4 Nml Nml Nml Nml Nml 0 Nml Nml    Right Add Longus Obturator L2-L4 Nml Nml Nml Nml Nml 0 Nml Nml    Right Tib Anterior Deep Fibular,  Fibula. .. L4-L5 Nml Nml Nml Nml Nml 0 Nml Nml    Right Fib longus  L5-S1 Nml Nml Nml Nml Nml 0 Nml Nml    Right Gastroc MH Tibial S1-S2 Nml Nml Nml Nml Nml 0 Nml Nml    Right Ext Dubose Long Deep Fibular,  Fibula. .. L5-S1 Nml Nml Nml Nml Nml 0 Nml Nml    Right EDB Deep Fibular,  Fibula. .. L5-S1 Nml Nml Nml Nml Nml 0 Nml Nml    Right AHB Medial Plantar,  Tibi. .. S1-S2 Nml Nml Nml Nml Nml 0 Nml Nml    Right Lumbo Paraspinal (Upper) Rami L1-L2 Nml Nml Nml         Right Lumbo Paraspinal (Mid) Rami L3-L4 Nml Nml Nml         Right Lumbo Paraspinal (Lower) Rami L5-S1 Nml Nml Nml         Left Gluteus Med Sup Gluteal L5-S1 Nml Nml Nml Nml Nml 0 Nml Nml    Left Vastus Med Femoral L2-L4 Nml Nml Nml Nml Nml 0 Nml Nml    Left Add Longus Obturator L2-L4 Nml Nml Nml Nml Nml 0 Nml Nml    Left Tib Anterior Deep Fibular,  Fibula. .. L4-L5 Nml Nml Nml Nml Nml 0 Nml Nml    Left Fib longus  L5-S1 Nml Nml Nml Nml Nml 0 Nml Nml    Left Gastroc MH Tibial S1-S2 Nml Nml Nml Nml Nml 0 Nml Nml    Left Ext Dubose Long Deep Fibular,  Fibula. .. L5-S1 Nml Nml Nml Nml Nml 0 Nml Nml    Left EDB Deep Fibular,  Fibula. .. L5-S1 Nml Nml Nml Nml Nml 0 Nml Nml    Left AHB Medial Plantar,  Tibi. ..  S1-S2 Nml Nml Nml Nml Nml 0 Nml Nml    Left Lumbo Paraspinal (Upper) Rami L1-L2 Nml Nml Nml         Left Lumbo Paraspinal (Mid) Rami L3-L4 Nml Nml Nml         Left Lumbo Paraspinal (Lower) Rami L5-S1 Nml Nml Nml           Electronically signed by Meri Andino DO on 11/3/2021 at 8:53 AM

## 2021-11-03 NOTE — TELEPHONE ENCOUNTER
----- Message from Bailey Haque MD sent at 11/1/2021 10:39 AM EDT -----  Echo confirms reduced heart function at 35%, pt already set up for cath following abnoraml stress and should have been set up for this. I will d/w pt further in cath lab and adjust meds as needed.

## 2021-11-04 NOTE — TELEPHONE ENCOUNTER
Spoke with patient's wife on mobile #. Patient is scheduled with Dr. Vicie Crigler for Left Heart Cath on 11/18/21 (wife has other appts on other days offered) at [facility], arrival time of 6:30am to the Cath Lab. Please have patient arrive to the main entrance of [facility] and check in with the registration desk. Please call regarding medication instructions. Remind patient to be NPO after midnight (8 hours prior). Do not apply lotions/creams on skin the day of procedure.     COVID testing -  Pre-Procedure Process   We are asking the physicians' offices to encourage all patients to obtain a COVID test prior to their procedure (within 6 days of their scheduled date)   The PAT RN will encourage the patient to obtain a COVID test prior to their procedure (within 6 days of their scheduled date)   This can be done at their Primary Care Doctor, [address] [place] [address], Urgent Care, [facility], [facility], etc.   A PCR or Rapid test result will be accepted        [facility]/Walk in ([address] [facility] main entrance of [facility])  Mondays - 0800 to 1700  Wednesdays - 0800 to 1700  Fridays - 0800 to 1700

## 2021-11-05 NOTE — TELEPHONE ENCOUNTER
Spoke to Smita Stephens patient wife, notified he will need updated HIPAA form next time at [facility] quan VU. Re-educated on Gemini's instructions.   ~ Notified to take all medications morning of procedure with sip of water.  ~ NPO after midnight.   ~ Pack overnight bag anticipation of stay. ~ Quan VU, no further questions will notify patient.

## 2021-11-15 ENCOUNTER — HOSPITAL ENCOUNTER (OUTPATIENT)
Age: 66
Discharge: HOME | End: 2021-11-15
Payer: MEDICARE

## 2021-11-15 PROCEDURE — U0005 INFEC AGEN DETEC AMPLI PROBE: HCPCS

## 2021-11-15 PROCEDURE — U0003 INFECTIOUS AGENT DETECTION BY NUCLEIC ACID (DNA OR RNA); SEVERE ACUTE RESPIRATORY SYNDROME CORONAVIRUS 2 (SARS-COV-2) (CORONAVIRUS DISEASE [COVID-19]), AMPLIFIED PROBE TECHNIQUE, MAKING USE OF HIGH THROUGHPUT TECHNOLOGIES AS DESCRIBED BY CMS-2020-01-R: HCPCS

## 2021-11-16 LAB — SARS-COV-2: NOT DETECTED

## 2021-11-18 ENCOUNTER — HOSPITAL ENCOUNTER (OUTPATIENT)
Dept: CARDIAC CATH/INVASIVE PROCEDURES | Age: 66
Discharge: HOME | End: 2021-11-18
Attending: INTERNAL MEDICINE | Admitting: INTERNAL MEDICINE
Payer: MEDICARE

## 2021-11-18 VITALS — WEIGHT: 228.2 LBS | BODY MASS INDEX: 34.59 KG/M2 | HEIGHT: 68 IN

## 2021-11-18 DIAGNOSIS — I25.810 ARTERIOSCLEROSIS OF ARTERIAL CORONARY ARTERY BYPASS GRAFT: Primary | ICD-10-CM

## 2021-11-18 LAB
ANION GAP SERPL CALCULATED.3IONS-SCNC: 10 MMOL/L (ref 3–16)
BUN BLDV-MCNC: 18 MG/DL (ref 7–20)
CALCIUM SERPL-MCNC: 9.1 MG/DL (ref 8.3–10.6)
CHLORIDE BLD-SCNC: 100 MMOL/L (ref 99–110)
CHOLESTEROL, TOTAL: 121 MG/DL (ref 0–199)
CO2: 28 MMOL/L (ref 21–32)
CREAT SERPL-MCNC: 0.7 MG/DL (ref 0.8–1.3)
EKG ATRIAL RATE: 43 BPM
EKG ATRIAL RATE: 54 BPM
EKG DIAGNOSIS: NORMAL
EKG DIAGNOSIS: NORMAL
EKG P AXIS: -1 DEGREES
EKG P AXIS: 62 DEGREES
EKG P-R INTERVAL: 146 MS
EKG P-R INTERVAL: 186 MS
EKG Q-T INTERVAL: 460 MS
EKG Q-T INTERVAL: 472 MS
EKG QRS DURATION: 104 MS
EKG QRS DURATION: 106 MS
EKG QTC CALCULATION (BAZETT): 398 MS
EKG QTC CALCULATION (BAZETT): 436 MS
EKG R AXIS: -30 DEGREES
EKG R AXIS: -30 DEGREES
EKG T AXIS: 108 DEGREES
EKG T AXIS: 133 DEGREES
EKG VENTRICULAR RATE: 43 BPM
EKG VENTRICULAR RATE: 54 BPM
GFR AFRICAN AMERICAN: >60
GFR NON-AFRICAN AMERICAN: >60
GLUCOSE BLD-MCNC: 142 MG/DL (ref 70–99)
HCT VFR BLD CALC: 47.2 % (ref 40.5–52.5)
HDLC SERPL-MCNC: 33 MG/DL (ref 40–60)
HEMOGLOBIN: 16 G/DL (ref 13.5–17.5)
LDL CHOLESTEROL CALCULATED: 68 MG/DL
MCH RBC QN AUTO: 31.9 PG (ref 26–34)
MCHC RBC AUTO-ENTMCNC: 34 G/DL (ref 31–36)
MCV RBC AUTO: 93.8 FL (ref 80–100)
PDW BLD-RTO: 14 % (ref 12.4–15.4)
PLATELET # BLD: 226 K/UL (ref 135–450)
PMV BLD AUTO: 6.7 FL (ref 5–10.5)
POTASSIUM SERPL-SCNC: 4.5 MMOL/L (ref 3.5–5.1)
RBC # BLD: 5.03 M/UL (ref 4.2–5.9)
SODIUM BLD-SCNC: 138 MMOL/L (ref 136–145)
TRIGL SERPL-MCNC: 100 MG/DL (ref 0–150)
VLDLC SERPL CALC-MCNC: 20 MG/DL
WBC # BLD: 7.2 K/UL (ref 4–11)

## 2021-11-18 PROCEDURE — 2500000003 HC RX 250 WO HCPCS

## 2021-11-18 PROCEDURE — 99152 MOD SED SAME PHYS/QHP 5/>YRS: CPT

## 2021-11-18 PROCEDURE — C1769 GUIDE WIRE: HCPCS

## 2021-11-18 PROCEDURE — 2709999900 HC NON-CHARGEABLE SUPPLY

## 2021-11-18 PROCEDURE — 6360000002 HC RX W HCPCS

## 2021-11-18 PROCEDURE — 85027 COMPLETE CBC AUTOMATED: CPT

## 2021-11-18 PROCEDURE — 6360000004 HC RX CONTRAST MEDICATION

## 2021-11-18 PROCEDURE — 6370000000 HC RX 637 (ALT 250 FOR IP)

## 2021-11-18 PROCEDURE — 93010 ELECTROCARDIOGRAM REPORT: CPT | Performed by: INTERNAL MEDICINE

## 2021-11-18 PROCEDURE — 93459 L HRT ART/GRFT ANGIO: CPT | Performed by: INTERNAL MEDICINE

## 2021-11-18 PROCEDURE — 99153 MOD SED SAME PHYS/QHP EA: CPT

## 2021-11-18 PROCEDURE — C1760 CLOSURE DEV, VASC: HCPCS

## 2021-11-18 PROCEDURE — 80061 LIPID PANEL: CPT

## 2021-11-18 PROCEDURE — 93459 L HRT ART/GRFT ANGIO: CPT

## 2021-11-18 PROCEDURE — C1894 INTRO/SHEATH, NON-LASER: HCPCS

## 2021-11-18 PROCEDURE — 99152 MOD SED SAME PHYS/QHP 5/>YRS: CPT | Performed by: INTERNAL MEDICINE

## 2021-11-18 PROCEDURE — 93005 ELECTROCARDIOGRAM TRACING: CPT | Performed by: INTERNAL MEDICINE

## 2021-11-18 PROCEDURE — 80048 BASIC METABOLIC PNL TOTAL CA: CPT

## 2021-11-18 RX ORDER — HYDRALAZINE HYDROCHLORIDE 20 MG/ML
INJECTION INTRAMUSCULAR; INTRAVENOUS
Status: COMPLETED | OUTPATIENT
Start: 2021-11-18 | End: 2021-11-18

## 2021-11-18 RX ORDER — SODIUM CHLORIDE 9 MG/ML
INJECTION, SOLUTION INTRAVENOUS ONCE
Status: COMPLETED | OUTPATIENT
Start: 2021-11-18 | End: 2021-11-18

## 2021-11-18 RX ORDER — HEPARIN SODIUM 1000 [USP'U]/ML
INJECTION, SOLUTION INTRAVENOUS; SUBCUTANEOUS
Status: COMPLETED | OUTPATIENT
Start: 2021-11-18 | End: 2021-11-18

## 2021-11-18 RX ORDER — MIDAZOLAM HYDROCHLORIDE 5 MG/ML
INJECTION INTRAMUSCULAR; INTRAVENOUS
Status: COMPLETED | OUTPATIENT
Start: 2021-11-18 | End: 2021-11-18

## 2021-11-18 RX ORDER — FENTANYL CITRATE 50 UG/ML
INJECTION, SOLUTION INTRAMUSCULAR; INTRAVENOUS
Status: COMPLETED | OUTPATIENT
Start: 2021-11-18 | End: 2021-11-18

## 2021-11-18 RX ORDER — SODIUM CHLORIDE 9 MG/ML
25 INJECTION, SOLUTION INTRAVENOUS PRN
Status: DISCONTINUED | OUTPATIENT
Start: 2021-11-18 | End: 2021-11-18 | Stop reason: HOSPADM

## 2021-11-18 RX ORDER — ASPIRIN 81 MG/1
324 TABLET, CHEWABLE ORAL ONCE
Status: COMPLETED | OUTPATIENT
Start: 2021-11-18 | End: 2021-11-18

## 2021-11-18 RX ORDER — SODIUM CHLORIDE 0.9 % (FLUSH) 0.9 %
5-40 SYRINGE (ML) INJECTION PRN
Status: DISCONTINUED | OUTPATIENT
Start: 2021-11-18 | End: 2021-11-18 | Stop reason: HOSPADM

## 2021-11-18 RX ORDER — LISINOPRIL 5 MG/1
5 TABLET ORAL DAILY
Qty: 90 TABLET | Refills: 1 | Status: SHIPPED | OUTPATIENT
Start: 2021-11-18 | End: 2022-08-01

## 2021-11-18 RX ORDER — SODIUM CHLORIDE 0.9 % (FLUSH) 0.9 %
5-40 SYRINGE (ML) INJECTION EVERY 12 HOURS SCHEDULED
Status: DISCONTINUED | OUTPATIENT
Start: 2021-11-18 | End: 2021-11-18 | Stop reason: HOSPADM

## 2021-11-18 RX ADMIN — MIDAZOLAM HYDROCHLORIDE 1 MG: 5 INJECTION INTRAMUSCULAR; INTRAVENOUS at 09:47

## 2021-11-18 RX ADMIN — FENTANYL CITRATE 25 MCG: 50 INJECTION, SOLUTION INTRAMUSCULAR; INTRAVENOUS at 08:37

## 2021-11-18 RX ADMIN — MIDAZOLAM HYDROCHLORIDE 1 MG: 5 INJECTION INTRAMUSCULAR; INTRAVENOUS at 09:37

## 2021-11-18 RX ADMIN — MIDAZOLAM HYDROCHLORIDE 1 MG: 5 INJECTION INTRAMUSCULAR; INTRAVENOUS at 08:38

## 2021-11-18 RX ADMIN — HYDRALAZINE HYDROCHLORIDE 10 MG: 20 INJECTION INTRAMUSCULAR; INTRAVENOUS at 08:45

## 2021-11-18 RX ADMIN — FENTANYL CITRATE 25 MCG: 50 INJECTION, SOLUTION INTRAMUSCULAR; INTRAVENOUS at 09:47

## 2021-11-18 RX ADMIN — SODIUM CHLORIDE: 9 INJECTION, SOLUTION INTRAVENOUS at 08:35

## 2021-11-18 RX ADMIN — HYDRALAZINE HYDROCHLORIDE 10 MG: 20 INJECTION INTRAMUSCULAR; INTRAVENOUS at 08:49

## 2021-11-18 RX ADMIN — HEPARIN SODIUM 4000 UNITS: 1000 INJECTION, SOLUTION INTRAVENOUS; SUBCUTANEOUS at 09:55

## 2021-11-18 RX ADMIN — ASPIRIN 324 MG: 81 TABLET, CHEWABLE ORAL at 07:02

## 2021-11-18 NOTE — OP NOTE
Operative Note      Patient: Alessia Hernandez  YOB: 1955  MRN: [id number]      Cardiac Cath  Postoperative Note      1. Pre-operative Diagnosis: preop        Post-operative Diagnosis: Same  2. Surgeons/Assistants: Mariajose Epperson MD, [facility]  3. Complications: None  4. Estimated Blood Loss: less than 50   5. Specimens: Were Not Obtained  6. Anesthesia: Local and Moderate Sedation  For sedation: Moderated sedation was achieved with Versed (4mg) and Fentanyl (50mcg). Monitoring of the patient's vital signs and respiratory status was provided by a trained independent observer nurse during the entire course of the procedure(s) and under my direct supervision and recorded in patient's medical record. The duration of sedation was 0837 to 1004. 7. Procedure(s) performed: Please see Xims chart for more detailed information on any catheter or equipment use. Further details on the procedure, sedation and proceedings of case  Moderate sedation. Left heart catheterization. Bilateral coronary angiography. Left ventriculogram.  Graft angiography x2  Left radial access  Ascending aortogram to evaluate for possible graft        Procedure Details:  Consent Access  site Bleed Risk Sedat US   Used*? Contrast Flouro TIme Fluoro  mGy   Yes  right femoral artery, left radial artery  low MCSFC  yes  265 mL  26.5  1116   *CPT 13300: If stated \"yes\", Ultrasound guidance was used to access right femoral artery using Seldinger technique after local infiltration of 1% lidocaine. Ultrasound(US) documented/visualized size, patency, pulsatility and exact location for puncture and determined ok to proceed. Real-time imaging used for needle entry into the vessel(s). Image was printed off [employer] and sent to medical records on a progress note.    *Sedation Note: MCSFC = minimal conscious sedation for comfort      Left Heart Cath:   Findings   LVEDP  15   LVEF  20%   LV wall motion  moderate global hypokinesis with severe inferior hypokinesis   Gradient across AV  none   Mitral regurg  nonsignificantly seen     Coronary Angiogram:  Artery Findings/Result   LM  100% occluded in proximal portion   LAD  100% proximally occluded   LCx  100% proximally occluded   RI    RCA  dominant, mild diffuse disease with 30% lesions of the proximal and mid. Of note there are right to left collaterals that fills what looks to be left-sided PL OM branch with some flow up into the mid circumflex       Graft Angio     1. SVG- Unknown vessel: 100% occluded at aorta    2. LIMA- midLAD (no clear jump seen): Widely patent with somewhat anterior takeoff. Fairly large LIMA touches down to the LAD. It does provide decent amount of antegrade flow down in LAD that wraps the apex. The LAD in this portion has only luminal irregularities. Does appear to give retrograde flow up and provide flow down a circumflex vessel that appears to be mildly diseased. It also appears to give retrograde flow down a ramus intermedius branch. However due to use of 4 Bangladeshi catheter I was not able to sufficiently inject this vessel to opacify the circumflex and ramus branches      NOTE: Due to contractual restrictions on supply chain and catheters. I did not have my usual 5 Western Geovanna Cordis KRYSTYNA catheter available today. The supplied Medtronic catheter had a different angle and I was not able to cannulate this artery/LIMA from groin access afer considerable attempts. Because of of this contractual restriction only a 4 Western Geovanna Cordis was available due to a [employer]" however I found that the 4 Western Geovanna KRYSTYNA was fairly floppy once it was warmed to body temperature and not able to provide great torque or push ability. This forced me to obtain access into the left radial artery extending radiation and contrast time. In addition the 4 Bangladeshi catheter was inadequate to completely opacify the left coronary system due to small Western Geovanna size. Assessment/Plan  1.   Severe CAD as above. Patient's previous cardiac records and surgery are unavailable due to the significant amount of time patient has seen a cardiologist.    2.  He has a 100% left main stenosis and is completely dependent on a LIMA to LAD with a jump graft to the circumflex. It appears that this may be unable to provide sufficient flow to the ramus and circumflex branches  Based on recent stress test he does have ischemia in this territory and I am concerned that the LIMA flow may not be adequate to cover his entire left coronary system needs. His RCA does show a infarct and this did match my LV gram.   - consider cardiac CTA to better eval grafts and targets. 3.  At this time patient has no apparent symptoms but I do consider him high risk to proceed with surgery I will discuss his case with vascular surgery and possibly CT surgery as well.         Electronically signed by Eulogio Handy MD on 11/18/2021 at 10:14 AM

## 2021-11-18 NOTE — H&P
Brief Pre-Op Note/Sedation Assessment      Jeanice Holstein  1955  [id number]  7:45 AM    Planned Procedure: Cardiac Catheterization Procedure  Post Procedure Plan: Return to same level of care  Consent: I have discussed with the patient and/or the patient representative the indication, alternatives, and the possible risks and/or complications of the planned procedure and the anesthesia methods. The patient and/or patient representative appear to understand and agree to proceed. Chief Complaint:   Pre-Op Clearance      Indications for Cath Procedure:  1. Presentation:  Pre-Operative Evaluation - Surgery Type: Non-Cardiac Surgery, Functional Capacity: < 4 METS, Surgical Risk: High Risk: Vascular, Solid Organ Transplant Surgery:  No  2. Anginal Classification within 2 weeks:  CCS II - Slight limitation, with angina only during vigorous physical activity  3. Angina Symptoms Assessment:  Asymptomatic  4. Heart Failure Class within last 2 weeks:  No symptoms  5. Cardiovascular Instability:  No    Prior Ischemic Workup/Eval:  1. Pre-Procedural Medications: Yes: Aspirin, Beta Blockers and STATIN  2. Stress Test Completed? No    Does Patient need surgery?   Cath Valve Surgery:  No    Pre-Procedure Medical History:  Vital Signs:  Ht 5' 8\" (1.727 m)   Wt 228 lb 3.2 oz (103.5 kg)   BMI 34.70 kg/m²     Allergies:  No Known Allergies  Medications:    Current Facility-Administered Medications   Medication Dose Route Frequency Provider Last Rate Last Admin    0.9 % sodium chloride infusion   IntraVENous Once Jet Mortensen MD           Past Medical History:    Past Medical History:   Diagnosis Date    CAD (coronary artery disease)     Hyperlipidemia     Hypertension     No longer taking meds    Thyroid disease     No longer taking meds       Surgical History:    Past Surgical History:   Procedure Laterality Date    ARTERIAL BYPASS SURGRY      triple in 2009   [address]  2011    CABG x3 vessel Pre-Sedation:  Pre-Sedation Documentation and Exam:  I have assessed the patient and reviewed the H&P on the chart. Prior History of Anesthesia Complications:   none    Modified Mallampati:  II (soft palate, uvula, fauces visible)    ASA Classification:  Class 3 - A patient with severe systemic disease that limits activity but is not incapacitating    Ernesto Scale: Activity:  2 - Able to move 4 extremities voluntarily on command  Respiration:  2 - Able to breathe deeply and cough freely  Circulation:  2 - BP+/- 20mmHg of normal  Consciousness:  2 - Fully awake  Oxygen Saturation (color):  2 - Able to maintain oxygen saturation >92% on room air    Sedation/Anesthesia Plan:  Guard the patient's safety and welfare. Minimize physical discomfort and pain. Minimize negative psychological responses to treatment by providing sedation and analgesia and maximize the potential amnesia. Patient to meet pre-procedure discharge plan.     Medication Planned:  midazolam intravenously and fentanyl intravenously    Patient is an appropriate candidate for plan of sedation:   yes      Electronically signed by Miri Krishnan MD on 11/18/2021 at 7:45 AM

## 2021-11-20 PROBLEM — Z01.810 PREOP CARDIOVASCULAR EXAM: Status: RESOLVED | Noted: 2021-10-21 | Resolved: 2021-11-20

## 2021-11-24 ENCOUNTER — TELEPHONE (OUTPATIENT)
Dept: CARDIOLOGY CLINIC | Age: 66
End: 2021-11-24

## 2021-11-24 ENCOUNTER — HOSPITAL ENCOUNTER (OUTPATIENT)
Age: 66
Discharge: HOME | End: 2021-11-24
Payer: MEDICARE

## 2021-11-24 NOTE — TELEPHONE ENCOUNTER
----- Message from Cecilio Bartlett MD sent at 11/19/2021  8:09 AM EST -----  Checked in Cath Lab.   Numbers look okay no change at this time

## 2021-11-24 NOTE — TELEPHONE ENCOUNTER
Called patient not home. Will call at a later time. Per Dr. Vicie Crigler: Checked in Cath Lab.   Numbers look okay no change at this time

## 2021-11-24 NOTE — TELEPHONE ENCOUNTER
Spoke with pt and relayed LYUDMILAJP'S message. Pt would like more detail. Pt stated \"what does that mean and would like more details from an RN\". Please advise.

## 2021-11-26 ENCOUNTER — TELEPHONE (OUTPATIENT)
Dept: CARDIOLOGY CLINIC | Age: 66
End: 2021-11-26

## 2021-11-26 NOTE — TELEPHONE ENCOUNTER
----- Message from Renee Stewart MD sent at 11/19/2021  8:09 AM EST -----  Checked in Cath Lab.   Numbers look okay no change at this time

## 2021-11-30 ENCOUNTER — HOSPITAL ENCOUNTER (OUTPATIENT)
Age: 66
Discharge: HOME | End: 2021-11-30
Payer: MEDICARE

## 2021-11-30 ENCOUNTER — HOSPITAL ENCOUNTER (OUTPATIENT)
Dept: CT IMAGING | Age: 66
Discharge: HOME | End: 2021-11-30
Payer: MEDICARE

## 2021-11-30 VITALS
DIASTOLIC BLOOD PRESSURE: 78 MMHG | SYSTOLIC BLOOD PRESSURE: 141 MMHG | HEIGHT: 68 IN | HEART RATE: 59 BPM | RESPIRATION RATE: 17 BRPM | WEIGHT: 225 LBS | OXYGEN SATURATION: 96 % | BODY MASS INDEX: 34.1 KG/M2

## 2021-11-30 DIAGNOSIS — I25.810 ARTERIOSCLEROSIS OF ARTERIAL CORONARY ARTERY BYPASS GRAFT: ICD-10-CM

## 2021-11-30 LAB
ALBUMIN SERPL-MCNC: 4.1 G/DL (ref 3.4–5)
ANION GAP SERPL CALCULATED.3IONS-SCNC: 10 MMOL/L (ref 3–16)
BUN BLDV-MCNC: 13 MG/DL (ref 7–20)
CALCIUM SERPL-MCNC: 9.7 MG/DL (ref 8.3–10.6)
CHLORIDE BLD-SCNC: 99 MMOL/L (ref 99–110)
CO2: 29 MMOL/L (ref 21–32)
CREAT SERPL-MCNC: 0.8 MG/DL (ref 0.8–1.3)
GFR AFRICAN AMERICAN: >60
GFR NON-AFRICAN AMERICAN: >60
GLUCOSE BLD-MCNC: 167 MG/DL (ref 70–99)
PHOSPHORUS: 3.8 MG/DL (ref 2.5–4.9)
POTASSIUM SERPL-SCNC: 5.5 MMOL/L (ref 3.5–5.1)
SODIUM BLD-SCNC: 138 MMOL/L (ref 136–145)

## 2021-11-30 PROCEDURE — 36415 COLL VENOUS BLD VENIPUNCTURE: CPT

## 2021-11-30 PROCEDURE — 6360000004 HC RX CONTRAST MEDICATION: Performed by: INTERNAL MEDICINE

## 2021-11-30 PROCEDURE — 80069 RENAL FUNCTION PANEL: CPT

## 2021-11-30 PROCEDURE — 75574 CT ANGIO HRT W/3D IMAGE: CPT

## 2021-11-30 RX ADMIN — IOPAMIDOL 100 ML: 755 INJECTION, SOLUTION INTRAVENOUS at 09:02

## 2021-11-30 NOTE — PROGRESS NOTES
Patient arrived for Coronary CTA. History and medications were reviewed with patient. Patient denies questions or concerns at this time. Patient has no known allergies. Past Medical History:   Diagnosis Date    CAD (coronary artery disease)     Hyperlipidemia     Hypertension     No longer taking meds    Thyroid disease     No longer taking meds       Prior to Admission medications    Medication Sig Start Date End Date Taking? Authorizing Provider   lisinopril (PRINIVIL;ZESTRIL) 5 MG tablet Take 1 tablet by mouth daily 11/18/21   Melisa Way MD   metoprolol succinate (TOPROL XL) 25 MG extended release tablet Take 1 tablet by mouth daily 10/21/21   Melisa Way MD   rosuvastatin (CRESTOR) 20 MG tablet Take 1 tablet by mouth daily 10/21/21   Melisa Way MD   aspirin 81 MG chewable tablet Take 81 mg by mouth daily. Historical Provider, MD       There were no vitals filed for this visit.     Patient took  Metoprolol  last night: No   Patient took 25 mg of Metoprolol this morning: Yes    IV placed: 20G to lac    Current heart rhythm: normal sinus with PVC

## 2021-11-30 NOTE — PROGRESS NOTES
Pt ambulated out of department safely, steady gait observed. Pt verbalized understanding of procedure and follow up plan.

## 2021-12-03 ENCOUNTER — TELEPHONE (OUTPATIENT)
Dept: CARDIOLOGY CLINIC | Age: 66
End: 2021-12-03

## 2021-12-06 ENCOUNTER — TELEPHONE (OUTPATIENT)
Dept: VASCULAR SURGERY | Age: 66
End: 2021-12-06

## 2021-12-06 DIAGNOSIS — I73.9 PERIPHERAL VASCULAR DISEASE, UNSPECIFIED (HCC): Primary | ICD-10-CM

## 2021-12-06 DIAGNOSIS — Z01.818 PREOP TESTING: ICD-10-CM

## 2021-12-06 NOTE — TELEPHONE ENCOUNTER
Called patient to schedule his vein marking for Dec 27, 2021 at 9:00am, arrive at 8:30am. Made apt to see DR Aishwarya Granados to update his H&P for 12/17/21. angelica

## 2021-12-15 ENCOUNTER — PREP FOR PROCEDURE (OUTPATIENT)
Dept: VASCULAR SURGERY | Age: 66
End: 2021-12-15

## 2021-12-15 ENCOUNTER — CLINICAL DOCUMENTATION (OUTPATIENT)
Dept: OTHER | Age: 66
End: 2021-12-15

## 2021-12-15 DIAGNOSIS — Z01.818 PREOP TESTING: Primary | ICD-10-CM

## 2021-12-15 RX ORDER — SODIUM CHLORIDE 0.9 % (FLUSH) 0.9 %
5-40 SYRINGE (ML) INJECTION PRN
Status: CANCELLED | OUTPATIENT
Start: 2021-12-15

## 2021-12-15 RX ORDER — SODIUM CHLORIDE 0.9 % (FLUSH) 0.9 %
5-40 SYRINGE (ML) INJECTION EVERY 12 HOURS SCHEDULED
Status: CANCELLED | OUTPATIENT
Start: 2021-12-15

## 2021-12-15 RX ORDER — SODIUM CHLORIDE 9 MG/ML
25 INJECTION, SOLUTION INTRAVENOUS PRN
Status: CANCELLED | OUTPATIENT
Start: 2021-12-15

## 2021-12-15 NOTE — PROGRESS NOTES
Josiah Manger    Age 73 y.o.    male    1955    MRN [id number]    12/29/2021  Arrival Time_____________  OR Time____________199 Min     Procedure(s):  LEFT FEMORAL TO POPLITEAL BYPASS GRAFT                      General     Surgeon(s):  Lily Watson, MD      DAY ADMIT ___  SDS/OP ___  OUTPT IN BED ___         Phone [phone] (home)    PCP _____________________ Phone_________________ Epic ( ) Epic CE ( ) Appt ________    ADDITIONAL INFO __________________________________ Cardio/Consult _____________    NOTES _____________________________________________________________________    ____________________________________________________________________________    PAT APPT DATE:________ TIME: ________  FAXED QAD: _______  (__) H&P w/ hospitalist  ____________________________________________________________________________    COVID TEST: Date/Location______________        NURSING HISTORY COMPLETE: _______  (__) CBC       (__) W/ DIFF ___________  (__)  ECHO    __________  (__) Hgb A1C    ___________  (__) CHEST X RAY   __________  (__) LIPID PROFILE  ___________  (__) EKG   __________  (__) PT/PTT   ___________  (__) PFT's   __________  (__) BMP   ___________  (__) CAROTIDS  __________  (__) CMP   ___________  (__) VEIN MAPPING  __________  (__) U/A   ___________  (__) HISTORY & PHYSICAL __________  (__) URINE C & S  ___________  (__) CARDIAC CLEARANCE __________  (__) U/A W/ FLEX  ___________  (__) PULM.  CLEARANCE __________  (__) SERUM PREGNANCY ___________  (__) Check Epic DOS orders __________  (__) TYPE & SCREEN ________ repeat ( ) (__)  __________________ __________  (__) ALBUMIN   ___________  (__)  __________________ __________  (__) TRANSFERRIN  ___________  (__)  __________________ __________  (__) LIVER PROFILE  ___________  (__)  __________________ __________  (__) CARBOXY HGB  ___________  (__) URINE PREG DOS __________  (__) NICOTINE & MET.  ___________  (__) BLOOD SUGAR DOS __________  (__) PREALBUMIN  ___________    (__) MRSA NASAL SWAB ___________  (__) BLOOD THINNERS __________  (__) ACE/ ARBS: _____________________    (__) BETABLOCKERS ___________________

## 2021-12-17 ENCOUNTER — OFFICE VISIT (OUTPATIENT)
Dept: VASCULAR SURGERY | Age: 66
End: 2021-12-17
Payer: MEDICARE

## 2021-12-17 VITALS
WEIGHT: 228 LBS | SYSTOLIC BLOOD PRESSURE: 162 MMHG | DIASTOLIC BLOOD PRESSURE: 80 MMHG | HEIGHT: 68 IN | BODY MASS INDEX: 34.56 KG/M2

## 2021-12-17 DIAGNOSIS — I73.9 SEVERE CLAUDICATION (HCC): Primary | ICD-10-CM

## 2021-12-17 PROCEDURE — G8484 FLU IMMUNIZE NO ADMIN: HCPCS | Performed by: SURGERY

## 2021-12-17 PROCEDURE — 4004F PT TOBACCO SCREEN RCVD TLK: CPT | Performed by: SURGERY

## 2021-12-17 PROCEDURE — 1123F ACP DISCUSS/DSCN MKR DOCD: CPT | Performed by: SURGERY

## 2021-12-17 PROCEDURE — G8417 CALC BMI ABV UP PARAM F/U: HCPCS | Performed by: SURGERY

## 2021-12-17 PROCEDURE — 3017F COLORECTAL CA SCREEN DOC REV: CPT | Performed by: SURGERY

## 2021-12-17 PROCEDURE — G8427 DOCREV CUR MEDS BY ELIG CLIN: HCPCS | Performed by: SURGERY

## 2021-12-17 PROCEDURE — 99214 OFFICE O/P EST MOD 30 MIN: CPT | Performed by: SURGERY

## 2021-12-17 PROCEDURE — 4040F PNEUMOC VAC/ADMIN/RCVD: CPT | Performed by: SURGERY

## 2021-12-17 NOTE — PROGRESS NOTES
Outpatient Follow Up Note    Max iWlson is 73 y.o. male who presents today for  follow up regarding:    Chief Complaint   Patient presents with   [facility] Other     patient is here to discuss surgery for 12/29/21. pamlr        Patient with continued severe bilateral claudication, L worse than R. He has thrombosed bilateral popliteal artery aneurysms with embolization to tibial vessels. He was scheduled for surgery previously but it was cancelled due to abnormal ECG. He has now undergone Cardiac w/u including Cath and Cardiac CT and has been cleared to proceed with OR. Past Medical History:   Diagnosis Date    CAD (coronary artery disease)     Hyperlipidemia     Hypertension     No longer taking meds    Thyroid disease     No longer taking meds       Past Surgical History:   Procedure Laterality Date    ARTERIAL BYPASS SURGRY      triple in 2009   [address]  2011    CABG x3 vessel     Social History:    Social History     Tobacco Use   Smoking Status Current Every Day Smoker    Packs/day: 2.00    Types: Cigarettes   Smokeless Tobacco Never Used     Current Medications:  Current Outpatient Medications   Medication Sig Dispense Refill    lisinopril (PRINIVIL;ZESTRIL) 5 MG tablet Take 1 tablet by mouth daily 90 tablet 1    metoprolol succinate (TOPROL XL) 25 MG extended release tablet Take 1 tablet by mouth daily 30 tablet 3    rosuvastatin (CRESTOR) 20 MG tablet Take 1 tablet by mouth daily 30 tablet 5    aspirin 81 MG chewable tablet Take 81 mg by mouth daily. No current facility-administered medications for this visit. Allergies:  Patient has no known allergies. REVIEW OF SYSTEMS:   A 14 point review of systems was completed. Pertinent positives identified in the HPI, all other review of systems negative.       Objective:   PHYSICAL EXAM:        VITALS:  BP (!) 162/80 (Site: Right Upper Arm)   Ht 5' 8\" (1.727 m)   Wt 228 lb (103.4 kg)   BMI 34.67 kg/m²   CONSTITUTIONAL: Cooperative, no apparent distress, and appears well nourished / developed  NEUROLOGIC:  Awake and oriented to person, place and time. PSYCH: Calm affect. SKIN: Warm and dry. HEENT: Sclera non-icteric, normocephalic, neck supple, normal carotid pulses with no bruits and thyroid normal size. LUNGS:  No increased work of breathing and clear to auscultation, no crackles or wheezing  CARDIOVASCULAR:  Regular rate and rhythm with no murmurs, gallops, rubs, or abnormal heart sounds, normal PMI. Pulses:    femoral DP PT   RIGHT 2 doppler doppler   LEFT 2 doppler doppler     ABDOMEN:  Normal bowel sounds, non-distended and non-tender to palpation  EXT: No edema, no calf tenderness. DATA:      Angiogram- Bilateral SFA  Disease. R Popliteal aneurysm occluded with reconstituted BK pop and runoff via AT. Left pros and mid SFA occlusion. Patent Pop aneurysm and BK pop. Assessment:      Diagnosis Orders   1. Severe claudication (HCC)       PVD and popliteal aneurysms. Plan:     Left Femoral to Below knee popliteal artery bypass. Discussed all risks ( including but not limited to death, MI, infection, hemorrhage wound healing problems, graft failure and nerve injury), benefits and alternatives to distal bypass with patient . He understands, freely consents and is eager to proceed. All questions and expectations addressed.         Anais Vela MD, FACS

## 2021-12-21 NOTE — PROGRESS NOTES
Preoperative Screening for Elective Surgery/Invasive Procedures While COVID-19 present in the community     Have you had any of the following symptoms? o Fever, chills  o Cough  o Shortness of breath  o Muscle aches/pain  o Diarrhea  o Abdominal pain, nausea, vomiting  o Loss or decrease in taste and / or smell   Risk of Exposure  o Have you recently been hospitalized for COVID-19 or flu-like illness, if so when?  o Recently diagnosed with COVID-19, if so when?  o Recently tested for COVID-19, if so when?  o Have you been in close contact with a person or family member who currently has or recently had COVID-19? If yes, when and in what context?  o Do you live with anybody who in the last 14 days has had fever, chills, shortness of breath, muscle aches, flu-like illness?  o Do you have any close contacts or family members who are currently in the hospital for COVID-19 or flu-like illness? If yes, assess recent close contact with this person. Indicate if the patient has a positive screen by answering yes to one or more of the above questions. Patients who test positive or screen positive prior to surgery or on the day of surgery should be evaluated in conjunction with the surgeon/proceduralist/anesthesiologist to determine the urgency of the procedure.      Patient answers no to all above questions

## 2021-12-21 NOTE — PROGRESS NOTES
Obstructive Sleep Apnea (PRITESH) Screening     Patient:  Lino Yang    YOB: 1955      Medical Record #:  [id number]                     Date:  12/21/2021     1. Are you a loud and/or regular snorer? []  Yes       [x] No    2. Have you been observed to gasp or stop breathing during sleep? []  Yes       [x] No    3. Do you feel tired or groggy upon awakening or do you awaken with a headache?           []  Yes       [x] No    4. Are you often tired or fatigued during the wake time hours? []  Yes       [x] No    5. Do you fall asleep sitting, reading, watching TV or driving? []  Yes       [x] No    6. Do you often have problems with memory or concentration? []  Yes       [x] No    **If patient's score is ? 3 they are considered high risk for PRITESH. An Anesthesia provider will evaluate the patient and develop a plan of care the day of surgery. Note:  If the patient's BMI is more than 35 kg m¯² , has neck circumference > 40 cm, and/or high blood pressure the risk is greater (© American Sleep Apnea Association, 2006).

## 2021-12-24 ENCOUNTER — HOSPITAL ENCOUNTER (OUTPATIENT)
Age: 66
Discharge: HOME | End: 2021-12-24
Payer: MEDICARE

## 2021-12-24 PROCEDURE — U0005 INFEC AGEN DETEC AMPLI PROBE: HCPCS

## 2021-12-24 PROCEDURE — U0003 INFECTIOUS AGENT DETECTION BY NUCLEIC ACID (DNA OR RNA); SEVERE ACUTE RESPIRATORY SYNDROME CORONAVIRUS 2 (SARS-COV-2) (CORONAVIRUS DISEASE [COVID-19]), AMPLIFIED PROBE TECHNIQUE, MAKING USE OF HIGH THROUGHPUT TECHNOLOGIES AS DESCRIBED BY CMS-2020-01-R: HCPCS

## 2021-12-25 LAB — SARS-COV-2: NOT DETECTED

## 2021-12-27 ENCOUNTER — HOSPITAL ENCOUNTER (OUTPATIENT)
Dept: VASCULAR LAB | Age: 66
Discharge: HOME | DRG: 254 | End: 2021-12-27
Payer: MEDICARE

## 2021-12-27 ENCOUNTER — HOSPITAL ENCOUNTER (OUTPATIENT)
Dept: PREADMISSION TESTING | Age: 66
Discharge: HOME | DRG: 254 | End: 2021-12-31
Payer: MEDICARE

## 2021-12-27 ENCOUNTER — ANESTHESIA EVENT (OUTPATIENT)
Dept: OPERATING ROOM | Age: 66
DRG: 254 | End: 2021-12-27
Payer: MEDICARE

## 2021-12-27 DIAGNOSIS — Z01.818 PREOP TESTING: ICD-10-CM

## 2021-12-27 LAB
ABO/RH: NORMAL
ANION GAP SERPL CALCULATED.3IONS-SCNC: 13 MMOL/L (ref 3–16)
ANTIBODY SCREEN: NORMAL
ANTIBODY SCREEN: NORMAL
BASOPHILS ABSOLUTE: 0.1 K/UL (ref 0–0.2)
BASOPHILS RELATIVE PERCENT: 1 %
BUN BLDV-MCNC: 12 MG/DL (ref 7–20)
CALCIUM SERPL-MCNC: 9.3 MG/DL (ref 8.3–10.6)
CHLORIDE BLD-SCNC: 97 MMOL/L (ref 99–110)
CO2: 29 MMOL/L (ref 21–32)
CREAT SERPL-MCNC: 0.8 MG/DL (ref 0.8–1.3)
EOSINOPHILS ABSOLUTE: 0.1 K/UL (ref 0–0.6)
EOSINOPHILS RELATIVE PERCENT: 1.6 %
GFR AFRICAN AMERICAN: >60
GFR NON-AFRICAN AMERICAN: >60
GLUCOSE BLD-MCNC: 166 MG/DL (ref 70–99)
HCT VFR BLD CALC: 50.1 % (ref 40.5–52.5)
HEMOGLOBIN: 17 G/DL (ref 13.5–17.5)
LYMPHOCYTES ABSOLUTE: 2.3 K/UL (ref 1–5.1)
LYMPHOCYTES RELATIVE PERCENT: 33.9 %
MCH RBC QN AUTO: 31.8 PG (ref 26–34)
MCHC RBC AUTO-ENTMCNC: 33.9 G/DL (ref 31–36)
MCV RBC AUTO: 93.9 FL (ref 80–100)
MONOCYTES ABSOLUTE: 0.5 K/UL (ref 0–1.3)
MONOCYTES RELATIVE PERCENT: 8 %
NEUTROPHILS ABSOLUTE: 3.8 K/UL (ref 1.7–7.7)
NEUTROPHILS RELATIVE PERCENT: 55.5 %
PDW BLD-RTO: 13.9 % (ref 12.4–15.4)
PLATELET # BLD: 203 K/UL (ref 135–450)
PMV BLD AUTO: 7.2 FL (ref 5–10.5)
POTASSIUM SERPL-SCNC: 4.6 MMOL/L (ref 3.5–5.1)
RBC # BLD: 5.34 M/UL (ref 4.2–5.9)
SODIUM BLD-SCNC: 139 MMOL/L (ref 136–145)
WBC # BLD: 6.8 K/UL (ref 4–11)

## 2021-12-27 PROCEDURE — 86850 RBC ANTIBODY SCREEN: CPT

## 2021-12-27 PROCEDURE — 86900 BLOOD TYPING SEROLOGIC ABO: CPT

## 2021-12-27 PROCEDURE — 36415 COLL VENOUS BLD VENIPUNCTURE: CPT

## 2021-12-27 PROCEDURE — 86901 BLOOD TYPING SEROLOGIC RH(D): CPT

## 2021-12-27 PROCEDURE — 85025 COMPLETE CBC W/AUTO DIFF WBC: CPT

## 2021-12-27 PROCEDURE — 80048 BASIC METABOLIC PNL TOTAL CA: CPT

## 2021-12-27 PROCEDURE — 93971 EXTREMITY STUDY: CPT

## 2021-12-28 ENCOUNTER — TELEPHONE (OUTPATIENT)
Dept: VASCULAR SURGERY | Age: 66
End: 2021-12-28

## 2021-12-28 NOTE — TELEPHONE ENCOUNTER
Called patient regarding reminder of surgery tomorrow. Arrive at 5:30am for 7:30am surgery. Npo after midnight.  angelica

## 2021-12-28 NOTE — TELEPHONE ENCOUNTER
Called patient regarding reminder of surgery for tomorrow as to arrive at 5:30am for 7:30am surgery. Npo after midnight.  angelica

## 2021-12-29 ENCOUNTER — ANESTHESIA (OUTPATIENT)
Dept: OPERATING ROOM | Age: 66
DRG: 254 | End: 2021-12-29
Payer: MEDICARE

## 2021-12-29 ENCOUNTER — HOSPITAL ENCOUNTER (INPATIENT)
Age: 66
LOS: 1 days | Discharge: HOME | DRG: 254 | End: 2021-12-30
Attending: SURGERY | Admitting: SURGERY
Payer: MEDICARE

## 2021-12-29 VITALS
OXYGEN SATURATION: 99 % | RESPIRATION RATE: 2 BRPM | DIASTOLIC BLOOD PRESSURE: 55 MMHG | TEMPERATURE: 99.3 F | SYSTOLIC BLOOD PRESSURE: 148 MMHG

## 2021-12-29 DIAGNOSIS — I99.8 ISCHEMIC REST PAIN OF LOWER EXTREMITY: Primary | ICD-10-CM

## 2021-12-29 DIAGNOSIS — M79.606 ISCHEMIC REST PAIN OF LOWER EXTREMITY: Primary | ICD-10-CM

## 2021-12-29 LAB
ABO/RH: NORMAL
ANTIBODY SCREEN: NORMAL
ANTIBODY SCREEN: NORMAL
POTASSIUM REFLEX MAGNESIUM: 4 MMOL/L (ref 3.5–5.1)

## 2021-12-29 PROCEDURE — 84132 ASSAY OF SERUM POTASSIUM: CPT

## 2021-12-29 PROCEDURE — 2500000003 HC RX 250 WO HCPCS

## 2021-12-29 PROCEDURE — 86901 BLOOD TYPING SEROLOGIC RH(D): CPT

## 2021-12-29 PROCEDURE — 6360000004 HC RX CONTRAST MEDICATION: Performed by: SURGERY

## 2021-12-29 PROCEDURE — 86900 BLOOD TYPING SEROLOGIC ABO: CPT

## 2021-12-29 PROCEDURE — 2500000003 HC RX 250 WO HCPCS: Performed by: ANESTHESIOLOGY

## 2021-12-29 PROCEDURE — 86850 RBC ANTIBODY SCREEN: CPT

## 2021-12-29 PROCEDURE — 3700000000 HC ANESTHESIA ATTENDED CARE: Performed by: SURGERY

## 2021-12-29 PROCEDURE — 3700000001 HC ADD 15 MINUTES (ANESTHESIA): Performed by: SURGERY

## 2021-12-29 PROCEDURE — 2060000000 HC ICU INTERMEDIATE R&B

## 2021-12-29 PROCEDURE — 2720000010 HC SURG SUPPLY STERILE: Performed by: SURGERY

## 2021-12-29 PROCEDURE — 2580000003 HC RX 258: Performed by: ANESTHESIOLOGY

## 2021-12-29 PROCEDURE — 2580000003 HC RX 258: Performed by: SURGERY

## 2021-12-29 PROCEDURE — 3600000017 HC SURGERY HYBRID ADDL 15MIN: Performed by: SURGERY

## 2021-12-29 PROCEDURE — 7100000000 HC PACU RECOVERY - FIRST 15 MIN: Performed by: SURGERY

## 2021-12-29 PROCEDURE — 6360000002 HC RX W HCPCS: Performed by: SURGERY

## 2021-12-29 PROCEDURE — 3600000007 HC SURGERY HYBRID BASE: Performed by: SURGERY

## 2021-12-29 PROCEDURE — 2709999900 HC NON-CHARGEABLE SUPPLY: Performed by: SURGERY

## 2021-12-29 PROCEDURE — 041L0ZL BYPASS LEFT FEMORAL ARTERY TO POPLITEAL ARTERY, OPEN APPROACH: ICD-10-PCS | Performed by: SURGERY

## 2021-12-29 PROCEDURE — 7100000001 HC PACU RECOVERY - ADDTL 15 MIN: Performed by: SURGERY

## 2021-12-29 PROCEDURE — 2700000000 HC OXYGEN THERAPY PER DAY

## 2021-12-29 PROCEDURE — 6370000000 HC RX 637 (ALT 250 FOR IP): Performed by: SURGERY

## 2021-12-29 PROCEDURE — 94761 N-INVAS EAR/PLS OXIMETRY MLT: CPT

## 2021-12-29 PROCEDURE — 35583 VEIN BYP GRFT FEM-POPLITEAL: CPT | Performed by: SURGERY

## 2021-12-29 PROCEDURE — 6360000002 HC RX W HCPCS

## 2021-12-29 RX ORDER — OXYCODONE HYDROCHLORIDE AND ACETAMINOPHEN 5; 325 MG/1; MG/1
2 TABLET ORAL PRN
Status: DISCONTINUED | OUTPATIENT
Start: 2021-12-29 | End: 2021-12-29 | Stop reason: HOSPADM

## 2021-12-29 RX ORDER — LISINOPRIL 5 MG/1
5 TABLET ORAL DAILY
Status: DISCONTINUED | OUTPATIENT
Start: 2021-12-29 | End: 2021-12-30 | Stop reason: HOSPADM

## 2021-12-29 RX ORDER — ONDANSETRON 2 MG/ML
4 INJECTION INTRAMUSCULAR; INTRAVENOUS EVERY 6 HOURS PRN
Status: DISCONTINUED | OUTPATIENT
Start: 2021-12-29 | End: 2021-12-30 | Stop reason: HOSPADM

## 2021-12-29 RX ORDER — OXYCODONE HYDROCHLORIDE 5 MG/1
10 TABLET ORAL EVERY 4 HOURS PRN
Status: DISCONTINUED | OUTPATIENT
Start: 2021-12-29 | End: 2021-12-30 | Stop reason: HOSPADM

## 2021-12-29 RX ORDER — SODIUM CHLORIDE 9 MG/ML
25 INJECTION, SOLUTION INTRAVENOUS PRN
Status: DISCONTINUED | OUTPATIENT
Start: 2021-12-29 | End: 2021-12-30 | Stop reason: HOSPADM

## 2021-12-29 RX ORDER — HYDRALAZINE HYDROCHLORIDE 20 MG/ML
10 INJECTION INTRAMUSCULAR; INTRAVENOUS
Status: DISCONTINUED | OUTPATIENT
Start: 2021-12-29 | End: 2021-12-30 | Stop reason: HOSPADM

## 2021-12-29 RX ORDER — MORPHINE SULFATE 2 MG/ML
2 INJECTION, SOLUTION INTRAMUSCULAR; INTRAVENOUS
Status: DISCONTINUED | OUTPATIENT
Start: 2021-12-29 | End: 2021-12-30 | Stop reason: HOSPADM

## 2021-12-29 RX ORDER — SUCCINYLCHOLINE CHLORIDE 20 MG/ML
INJECTION INTRAMUSCULAR; INTRAVENOUS PRN
Status: DISCONTINUED | OUTPATIENT
Start: 2021-12-29 | End: 2021-12-29 | Stop reason: SDUPTHER

## 2021-12-29 RX ORDER — OXYCODONE HYDROCHLORIDE AND ACETAMINOPHEN 5; 325 MG/1; MG/1
1 TABLET ORAL PRN
Status: DISCONTINUED | OUTPATIENT
Start: 2021-12-29 | End: 2021-12-29 | Stop reason: HOSPADM

## 2021-12-29 RX ORDER — SODIUM CHLORIDE 9 MG/ML
25 INJECTION, SOLUTION INTRAVENOUS PRN
Status: DISCONTINUED | OUTPATIENT
Start: 2021-12-29 | End: 2021-12-29 | Stop reason: HOSPADM

## 2021-12-29 RX ORDER — DEXMEDETOMIDINE HYDROCHLORIDE 4 UG/ML
INJECTION, SOLUTION INTRAVENOUS CONTINUOUS PRN
Status: DISCONTINUED | OUTPATIENT
Start: 2021-12-29 | End: 2021-12-29 | Stop reason: SDUPTHER

## 2021-12-29 RX ORDER — POTASSIUM CHLORIDE 7.45 MG/ML
10 INJECTION INTRAVENOUS PRN
Status: DISCONTINUED | OUTPATIENT
Start: 2021-12-29 | End: 2021-12-30 | Stop reason: HOSPADM

## 2021-12-29 RX ORDER — PROMETHAZINE HYDROCHLORIDE 25 MG/1
12.5 TABLET ORAL EVERY 6 HOURS PRN
Status: DISCONTINUED | OUTPATIENT
Start: 2021-12-29 | End: 2021-12-30 | Stop reason: HOSPADM

## 2021-12-29 RX ORDER — DEXAMETHASONE SODIUM PHOSPHATE 10 MG/ML
INJECTION INTRAMUSCULAR; INTRAVENOUS PRN
Status: DISCONTINUED | OUTPATIENT
Start: 2021-12-29 | End: 2021-12-29 | Stop reason: SDUPTHER

## 2021-12-29 RX ORDER — KETAMINE HYDROCHLORIDE 100 MG/ML
INJECTION, SOLUTION INTRAMUSCULAR; INTRAVENOUS PRN
Status: DISCONTINUED | OUTPATIENT
Start: 2021-12-29 | End: 2021-12-29 | Stop reason: SDUPTHER

## 2021-12-29 RX ORDER — ASPIRIN 81 MG/1
81 TABLET, CHEWABLE ORAL DAILY
Status: DISCONTINUED | OUTPATIENT
Start: 2021-12-29 | End: 2021-12-30 | Stop reason: HOSPADM

## 2021-12-29 RX ORDER — MIDAZOLAM HYDROCHLORIDE 1 MG/ML
INJECTION INTRAMUSCULAR; INTRAVENOUS PRN
Status: DISCONTINUED | OUTPATIENT
Start: 2021-12-29 | End: 2021-12-29 | Stop reason: SDUPTHER

## 2021-12-29 RX ORDER — SODIUM CHLORIDE 0.9 % (FLUSH) 0.9 %
10 SYRINGE (ML) INJECTION EVERY 12 HOURS SCHEDULED
Status: DISCONTINUED | OUTPATIENT
Start: 2021-12-29 | End: 2021-12-29 | Stop reason: HOSPADM

## 2021-12-29 RX ORDER — MORPHINE SULFATE 2 MG/ML
1 INJECTION, SOLUTION INTRAMUSCULAR; INTRAVENOUS EVERY 5 MIN PRN
Status: DISCONTINUED | OUTPATIENT
Start: 2021-12-29 | End: 2021-12-29 | Stop reason: HOSPADM

## 2021-12-29 RX ORDER — GLYCOPYRROLATE 0.2 MG/ML
INJECTION INTRAMUSCULAR; INTRAVENOUS PRN
Status: DISCONTINUED | OUTPATIENT
Start: 2021-12-29 | End: 2021-12-29 | Stop reason: SDUPTHER

## 2021-12-29 RX ORDER — MORPHINE SULFATE 4 MG/ML
4 INJECTION, SOLUTION INTRAMUSCULAR; INTRAVENOUS
Status: DISCONTINUED | OUTPATIENT
Start: 2021-12-29 | End: 2021-12-30 | Stop reason: HOSPADM

## 2021-12-29 RX ORDER — MORPHINE SULFATE 2 MG/ML
2 INJECTION, SOLUTION INTRAMUSCULAR; INTRAVENOUS EVERY 5 MIN PRN
Status: DISCONTINUED | OUTPATIENT
Start: 2021-12-29 | End: 2021-12-29 | Stop reason: HOSPADM

## 2021-12-29 RX ORDER — SODIUM CHLORIDE, SODIUM LACTATE, POTASSIUM CHLORIDE, CALCIUM CHLORIDE 600; 310; 30; 20 MG/100ML; MG/100ML; MG/100ML; MG/100ML
INJECTION, SOLUTION INTRAVENOUS CONTINUOUS
Status: DISCONTINUED | OUTPATIENT
Start: 2021-12-29 | End: 2021-12-29

## 2021-12-29 RX ORDER — MAGNESIUM SULFATE HEPTAHYDRATE 500 MG/ML
INJECTION, SOLUTION INTRAMUSCULAR; INTRAVENOUS PRN
Status: DISCONTINUED | OUTPATIENT
Start: 2021-12-29 | End: 2021-12-29 | Stop reason: SDUPTHER

## 2021-12-29 RX ORDER — NOREPINEPHRINE BITARTRATE 1 MG/ML
INJECTION, SOLUTION INTRAVENOUS PRN
Status: DISCONTINUED | OUTPATIENT
Start: 2021-12-29 | End: 2021-12-29 | Stop reason: SDUPTHER

## 2021-12-29 RX ORDER — ROCURONIUM BROMIDE 10 MG/ML
INJECTION, SOLUTION INTRAVENOUS PRN
Status: DISCONTINUED | OUTPATIENT
Start: 2021-12-29 | End: 2021-12-29 | Stop reason: SDUPTHER

## 2021-12-29 RX ORDER — ONDANSETRON 2 MG/ML
4 INJECTION INTRAMUSCULAR; INTRAVENOUS
Status: DISCONTINUED | OUTPATIENT
Start: 2021-12-29 | End: 2021-12-29 | Stop reason: HOSPADM

## 2021-12-29 RX ORDER — HEPARIN SODIUM 10000 [USP'U]/ML
INJECTION, SOLUTION INTRAVENOUS; SUBCUTANEOUS PRN
Status: DISCONTINUED | OUTPATIENT
Start: 2021-12-29 | End: 2021-12-29 | Stop reason: SDUPTHER

## 2021-12-29 RX ORDER — OXYCODONE HYDROCHLORIDE 5 MG/1
5 TABLET ORAL EVERY 4 HOURS PRN
Status: DISCONTINUED | OUTPATIENT
Start: 2021-12-29 | End: 2021-12-30 | Stop reason: HOSPADM

## 2021-12-29 RX ORDER — METOPROLOL SUCCINATE 25 MG/1
25 TABLET, EXTENDED RELEASE ORAL DAILY
Status: DISCONTINUED | OUTPATIENT
Start: 2021-12-29 | End: 2021-12-30 | Stop reason: HOSPADM

## 2021-12-29 RX ORDER — LIDOCAINE HYDROCHLORIDE 20 MG/ML
INJECTION, SOLUTION EPIDURAL; INFILTRATION; INTRACAUDAL; PERINEURAL PRN
Status: DISCONTINUED | OUTPATIENT
Start: 2021-12-29 | End: 2021-12-29 | Stop reason: SDUPTHER

## 2021-12-29 RX ORDER — MEPERIDINE HYDROCHLORIDE 50 MG/ML
12.5 INJECTION INTRAMUSCULAR; INTRAVENOUS; SUBCUTANEOUS EVERY 5 MIN PRN
Status: DISCONTINUED | OUTPATIENT
Start: 2021-12-29 | End: 2021-12-29 | Stop reason: HOSPADM

## 2021-12-29 RX ORDER — CLONIDINE HYDROCHLORIDE 0.1 MG/1
0.1 TABLET ORAL
Status: DISCONTINUED | OUTPATIENT
Start: 2021-12-29 | End: 2021-12-30 | Stop reason: HOSPADM

## 2021-12-29 RX ORDER — FENTANYL CITRATE 50 UG/ML
INJECTION, SOLUTION INTRAMUSCULAR; INTRAVENOUS PRN
Status: DISCONTINUED | OUTPATIENT
Start: 2021-12-29 | End: 2021-12-29 | Stop reason: SDUPTHER

## 2021-12-29 RX ORDER — EPHEDRINE SULFATE 50 MG/ML
INJECTION, SOLUTION INTRAVENOUS PRN
Status: DISCONTINUED | OUTPATIENT
Start: 2021-12-29 | End: 2021-12-29 | Stop reason: SDUPTHER

## 2021-12-29 RX ORDER — SODIUM CHLORIDE 9 MG/ML
INJECTION, SOLUTION INTRAVENOUS CONTINUOUS
Status: DISCONTINUED | OUTPATIENT
Start: 2021-12-29 | End: 2021-12-30

## 2021-12-29 RX ORDER — SODIUM CHLORIDE 0.9 % (FLUSH) 0.9 %
10 SYRINGE (ML) INJECTION PRN
Status: DISCONTINUED | OUTPATIENT
Start: 2021-12-29 | End: 2021-12-30 | Stop reason: HOSPADM

## 2021-12-29 RX ORDER — ONDANSETRON 2 MG/ML
INJECTION INTRAMUSCULAR; INTRAVENOUS PRN
Status: DISCONTINUED | OUTPATIENT
Start: 2021-12-29 | End: 2021-12-29 | Stop reason: SDUPTHER

## 2021-12-29 RX ORDER — SODIUM CHLORIDE 0.9 % (FLUSH) 0.9 %
10 SYRINGE (ML) INJECTION PRN
Status: DISCONTINUED | OUTPATIENT
Start: 2021-12-29 | End: 2021-12-29 | Stop reason: HOSPADM

## 2021-12-29 RX ORDER — ROSUVASTATIN CALCIUM 10 MG/1
20 TABLET, COATED ORAL DAILY
Status: DISCONTINUED | OUTPATIENT
Start: 2021-12-29 | End: 2021-12-30 | Stop reason: HOSPADM

## 2021-12-29 RX ORDER — PROPOFOL 10 MG/ML
INJECTION, EMULSION INTRAVENOUS PRN
Status: DISCONTINUED | OUTPATIENT
Start: 2021-12-29 | End: 2021-12-29 | Stop reason: SDUPTHER

## 2021-12-29 RX ORDER — SODIUM CHLORIDE 0.9 % (FLUSH) 0.9 %
10 SYRINGE (ML) INJECTION EVERY 12 HOURS SCHEDULED
Status: DISCONTINUED | OUTPATIENT
Start: 2021-12-29 | End: 2021-12-30 | Stop reason: HOSPADM

## 2021-12-29 RX ADMIN — KETAMINE HYDROCHLORIDE 10 MG: 100 INJECTION INTRAMUSCULAR; INTRAVENOUS at 07:30

## 2021-12-29 RX ADMIN — ROCURONIUM BROMIDE 20 MG: 10 SOLUTION INTRAVENOUS at 08:16

## 2021-12-29 RX ADMIN — ASPIRIN 81 MG 81 MG: 81 TABLET ORAL at 20:47

## 2021-12-29 RX ADMIN — SODIUM CHLORIDE, POTASSIUM CHLORIDE, SODIUM LACTATE AND CALCIUM CHLORIDE: 600; 310; 30; 20 INJECTION, SOLUTION INTRAVENOUS at 11:12

## 2021-12-29 RX ADMIN — SODIUM CHLORIDE, POTASSIUM CHLORIDE, SODIUM LACTATE AND CALCIUM CHLORIDE: 600; 310; 30; 20 INJECTION, SOLUTION INTRAVENOUS at 06:51

## 2021-12-29 RX ADMIN — DEXAMETHASONE SODIUM PHOSPHATE 10 MG: 10 INJECTION INTRAMUSCULAR; INTRAVENOUS at 07:37

## 2021-12-29 RX ADMIN — FENTANYL CITRATE 50 MCG: 50 INJECTION, SOLUTION INTRAMUSCULAR; INTRAVENOUS at 10:37

## 2021-12-29 RX ADMIN — Medication 10 ML: at 20:48

## 2021-12-29 RX ADMIN — CEFAZOLIN 2000 MG: 10 INJECTION, POWDER, FOR SOLUTION INTRAVENOUS at 07:40

## 2021-12-29 RX ADMIN — METOPROLOL SUCCINATE 25 MG: 25 TABLET, EXTENDED RELEASE ORAL at 20:47

## 2021-12-29 RX ADMIN — NOREPINEPHRINE BITARTRATE 4 MCG: 1 INJECTION INTRAVENOUS at 07:58

## 2021-12-29 RX ADMIN — LISINOPRIL 5 MG: 5 TABLET ORAL at 20:47

## 2021-12-29 RX ADMIN — NOREPINEPHRINE BITARTRATE 8 MCG: 1 INJECTION INTRAVENOUS at 07:43

## 2021-12-29 RX ADMIN — ROCURONIUM BROMIDE 40 MG: 10 SOLUTION INTRAVENOUS at 07:45

## 2021-12-29 RX ADMIN — DEXMEDETOMIDINE HYDROCHLORIDE 0.3 MCG/KG/HR: 4 INJECTION, SOLUTION INTRAVENOUS at 08:18

## 2021-12-29 RX ADMIN — ROCURONIUM BROMIDE 10 MG: 10 SOLUTION INTRAVENOUS at 07:35

## 2021-12-29 RX ADMIN — SODIUM CHLORIDE: 9 INJECTION, SOLUTION INTRAVENOUS at 17:54

## 2021-12-29 RX ADMIN — SUCCINYLCHOLINE CHLORIDE 160 MG: 20 INJECTION, SOLUTION INTRAMUSCULAR; INTRAVENOUS at 07:37

## 2021-12-29 RX ADMIN — MIDAZOLAM HYDROCHLORIDE 2 MG: 2 INJECTION, SOLUTION INTRAMUSCULAR; INTRAVENOUS at 07:29

## 2021-12-29 RX ADMIN — PROPOFOL 50 MG: 10 INJECTION, EMULSION INTRAVENOUS at 07:36

## 2021-12-29 RX ADMIN — FAMOTIDINE 20 MG: 10 INJECTION, SOLUTION INTRAVENOUS at 06:51

## 2021-12-29 RX ADMIN — FENTANYL CITRATE 50 MCG: 50 INJECTION, SOLUTION INTRAMUSCULAR; INTRAVENOUS at 07:29

## 2021-12-29 RX ADMIN — ROCURONIUM BROMIDE 20 MG: 10 SOLUTION INTRAVENOUS at 09:35

## 2021-12-29 RX ADMIN — FENTANYL CITRATE 50 MCG: 50 INJECTION, SOLUTION INTRAMUSCULAR; INTRAVENOUS at 08:07

## 2021-12-29 RX ADMIN — GLYCOPYRROLATE 0.2 MG: 0.2 INJECTION, SOLUTION INTRAMUSCULAR; INTRAVENOUS at 07:40

## 2021-12-29 RX ADMIN — LIDOCAINE HYDROCHLORIDE 140 MG: 20 INJECTION, SOLUTION EPIDURAL; INFILTRATION; INTRACAUDAL; PERINEURAL at 07:29

## 2021-12-29 RX ADMIN — MAGNESIUM SULFATE HEPTAHYDRATE 2 G: 500 INJECTION, SOLUTION INTRAMUSCULAR; INTRAVENOUS at 08:11

## 2021-12-29 RX ADMIN — EPHEDRINE SULFATE 25 MG: 50 INJECTION INTRAMUSCULAR; INTRAVENOUS; SUBCUTANEOUS at 07:40

## 2021-12-29 RX ADMIN — KETAMINE HYDROCHLORIDE 20 MG: 100 INJECTION INTRAMUSCULAR; INTRAVENOUS at 08:45

## 2021-12-29 RX ADMIN — KETAMINE HYDROCHLORIDE 20 MG: 100 INJECTION INTRAMUSCULAR; INTRAVENOUS at 07:35

## 2021-12-29 RX ADMIN — KETAMINE HYDROCHLORIDE 10 MG: 100 INJECTION INTRAMUSCULAR; INTRAVENOUS at 09:55

## 2021-12-29 RX ADMIN — ONDANSETRON 4 MG: 2 INJECTION INTRAMUSCULAR; INTRAVENOUS at 07:29

## 2021-12-29 RX ADMIN — ROSUVASTATIN CALCIUM 20 MG: 10 TABLET, FILM COATED ORAL at 20:48

## 2021-12-29 RX ADMIN — SUGAMMADEX 200 MG: 100 INJECTION, SOLUTION INTRAVENOUS at 10:46

## 2021-12-29 RX ADMIN — HEPARIN SODIUM 5000 UNITS: 10000 INJECTION, SOLUTION INTRAVENOUS; SUBCUTANEOUS at 08:53

## 2021-12-29 ASSESSMENT — PULMONARY FUNCTION TESTS
PIF_VALUE: 25
PIF_VALUE: 27
PIF_VALUE: 27
PIF_VALUE: 21
PIF_VALUE: 27
PIF_VALUE: 32
PIF_VALUE: 27
PIF_VALUE: 29
PIF_VALUE: 27
PIF_VALUE: 27
PIF_VALUE: 24
PIF_VALUE: 29
PIF_VALUE: 28
PIF_VALUE: 25
PIF_VALUE: 21
PIF_VALUE: 27
PIF_VALUE: 1
PIF_VALUE: 21
PIF_VALUE: 26
PIF_VALUE: 0
PIF_VALUE: 24
PIF_VALUE: 27
PIF_VALUE: 25
PIF_VALUE: 27
PIF_VALUE: 26
PIF_VALUE: 30
PIF_VALUE: 28
PIF_VALUE: 24
PIF_VALUE: 30
PIF_VALUE: 28
PIF_VALUE: 27
PIF_VALUE: 27
PIF_VALUE: 28
PIF_VALUE: 25
PIF_VALUE: 28
PIF_VALUE: 28
PIF_VALUE: 25
PIF_VALUE: 30
PIF_VALUE: 26
PIF_VALUE: 28
PIF_VALUE: 27
PIF_VALUE: 25
PIF_VALUE: 29
PIF_VALUE: 27
PIF_VALUE: 28
PIF_VALUE: 29
PIF_VALUE: 24
PIF_VALUE: 30
PIF_VALUE: 28
PIF_VALUE: 30
PIF_VALUE: 24
PIF_VALUE: 28
PIF_VALUE: 18
PIF_VALUE: 26
PIF_VALUE: 30
PIF_VALUE: 27
PIF_VALUE: 30
PIF_VALUE: 1
PIF_VALUE: 28
PIF_VALUE: 28
PIF_VALUE: 27
PIF_VALUE: 30
PIF_VALUE: 28
PIF_VALUE: 30
PIF_VALUE: 26
PIF_VALUE: 28
PIF_VALUE: 25
PIF_VALUE: 25
PIF_VALUE: 1
PIF_VALUE: 0
PIF_VALUE: 32
PIF_VALUE: 27
PIF_VALUE: 22
PIF_VALUE: 28
PIF_VALUE: 30
PIF_VALUE: 28
PIF_VALUE: 25
PIF_VALUE: 28
PIF_VALUE: 20
PIF_VALUE: 27
PIF_VALUE: 24
PIF_VALUE: 24
PIF_VALUE: 25
PIF_VALUE: 27
PIF_VALUE: 30
PIF_VALUE: 27
PIF_VALUE: 24
PIF_VALUE: 28
PIF_VALUE: 25
PIF_VALUE: 24
PIF_VALUE: 28
PIF_VALUE: 27
PIF_VALUE: 28
PIF_VALUE: 27
PIF_VALUE: 28
PIF_VALUE: 27
PIF_VALUE: 24
PIF_VALUE: 28
PIF_VALUE: 27
PIF_VALUE: 28
PIF_VALUE: 27
PIF_VALUE: 25
PIF_VALUE: 28
PIF_VALUE: 27
PIF_VALUE: 30
PIF_VALUE: 24
PIF_VALUE: 27
PIF_VALUE: 29
PIF_VALUE: 27
PIF_VALUE: 30
PIF_VALUE: 25
PIF_VALUE: 31
PIF_VALUE: 27
PIF_VALUE: 30
PIF_VALUE: 28
PIF_VALUE: 25
PIF_VALUE: 27
PIF_VALUE: 24
PIF_VALUE: 27
PIF_VALUE: 28
PIF_VALUE: 28
PIF_VALUE: 27
PIF_VALUE: 18
PIF_VALUE: 26
PIF_VALUE: 27
PIF_VALUE: 25
PIF_VALUE: 2
PIF_VALUE: 30
PIF_VALUE: 26
PIF_VALUE: 24
PIF_VALUE: 24
PIF_VALUE: 27
PIF_VALUE: 28
PIF_VALUE: 20
PIF_VALUE: 27
PIF_VALUE: 28
PIF_VALUE: 28
PIF_VALUE: 30
PIF_VALUE: 25
PIF_VALUE: 27
PIF_VALUE: 27
PIF_VALUE: 28
PIF_VALUE: 30
PIF_VALUE: 25
PIF_VALUE: 28
PIF_VALUE: 25
PIF_VALUE: 27
PIF_VALUE: 25
PIF_VALUE: 25
PIF_VALUE: 24
PIF_VALUE: 25
PIF_VALUE: 30
PIF_VALUE: 27
PIF_VALUE: 24
PIF_VALUE: 28
PIF_VALUE: 1
PIF_VALUE: 21
PIF_VALUE: 28
PIF_VALUE: 24
PIF_VALUE: 27
PIF_VALUE: 25
PIF_VALUE: 29
PIF_VALUE: 28
PIF_VALUE: 27
PIF_VALUE: 27
PIF_VALUE: 20
PIF_VALUE: 27
PIF_VALUE: 28
PIF_VALUE: 27
PIF_VALUE: 25
PIF_VALUE: 25
PIF_VALUE: 27
PIF_VALUE: 30
PIF_VALUE: 31
PIF_VALUE: 27
PIF_VALUE: 29
PIF_VALUE: 28
PIF_VALUE: 30
PIF_VALUE: 27
PIF_VALUE: 28
PIF_VALUE: 27
PIF_VALUE: 28
PIF_VALUE: 20
PIF_VALUE: 28
PIF_VALUE: 27
PIF_VALUE: 21
PIF_VALUE: 25
PIF_VALUE: 25
PIF_VALUE: 24
PIF_VALUE: 30
PIF_VALUE: 28
PIF_VALUE: 27
PIF_VALUE: 1
PIF_VALUE: 27
PIF_VALUE: 27
PIF_VALUE: 24
PIF_VALUE: 27
PIF_VALUE: 24
PIF_VALUE: 28
PIF_VALUE: 24
PIF_VALUE: 9
PIF_VALUE: 28
PIF_VALUE: 25

## 2021-12-29 ASSESSMENT — PAIN SCALES - GENERAL
PAINLEVEL_OUTOF10: 0

## 2021-12-29 ASSESSMENT — ENCOUNTER SYMPTOMS: SHORTNESS OF BREATH: 1

## 2021-12-29 ASSESSMENT — LIFESTYLE VARIABLES: SMOKING_STATUS: 1

## 2021-12-29 ASSESSMENT — PAIN - FUNCTIONAL ASSESSMENT: PAIN_FUNCTIONAL_ASSESSMENT: 0-10

## 2021-12-29 NOTE — H&P
I have reviewed the history and physical (See note dated 12/17/2021) and examined the patient and find no relevant changes. I have reviewed with the patient and/or family the risks, benefits, and alternatives to the procedure.

## 2021-12-29 NOTE — PROGRESS NOTES
Patient arrived in PACU at this time and placed on monitor. Report received from [facility] and [address] [facility]. Will continue to monitor.     8 L/min O2 via simple mask. . Left pedal and post tibial pulse palpable upon arrival.

## 2021-12-29 NOTE — PROGRESS NOTES
D: Bedside report received from [facility], all current drips, treatments, skin issues, and plan of care were reviewed by both RN's, patient transferred in stable condition.

## 2021-12-29 NOTE — ANESTHESIA POSTPROCEDURE EVALUATION
Department of Anesthesiology  Postprocedure Note    Patient: Tisha Hill  MRN: [id number]  YOB: 1955  Date of evaluation: 12/29/2021  Time:  3:58 PM     Procedure Summary     Date: 12/29/21 Room / Location: [place] [address] (HYBRID) / [facility]    Anesthesia Start: 1365 Anesthesia Stop: 0635    Procedure: LEFT FEMORAL POPLITEAL BYPASS (Left Groin) Diagnosis:       Peripheral vascular disease, unspecified ([address]      (PERIPHERAL VASCULAR DISEASE)    Surgeons: Daysi Bahena MD Responsible Provider: Cheryl Anderson MD    Anesthesia Type: general ASA Status: 3          Anesthesia Type: general    Ernesto Phase I: Ernesto Score: 8    Ernesto Phase II:      Last vitals: Reviewed and per EMR flowsheets.      Vitals:    12/29/21 1500 12/29/21 1505 12/29/21 1510 12/29/21 1515   BP: 107/69 123/68 127/60    Pulse: (!) 48 76 (!) 48 51   Resp: 16 13 13 18   Temp:       TempSrc:       SpO2: 92% 94% 90% 93%   Weight:       Height:         Anesthesia Post Evaluation    Patient location during evaluation: bedside  Patient participation: complete - patient participated  Level of consciousness: awake and alert  Airway patency: patent  Nausea & Vomiting: no nausea  Complications: no  Cardiovascular status: hemodynamically stable  Respiratory status: acceptable  Hydration status: euvolemic

## 2021-12-29 NOTE — BRIEF OP NOTE
Brief Postoperative Note      Patient: Jigar Leon  YOB: 1955  MRN: [id number]    Date of Procedure: 12/29/2021    Pre-Op Diagnosis: PERIPHERAL VASCULAR DISEASE/ ISCHEMIC REST PAIN    Post-Op Diagnosis: Same       Procedure(s):  LEFT FEMORAL POPLITEAL BYPASS    Surgeon(s):  Hemalatha Curry MD    Assistant:  Surgical Assistant: Julieta Ojeda    Anesthesia: General    Estimated Blood Loss (mL): less than 469     Complications: None    Specimens:   * No specimens in log *    Implants:  * No implants in log *      Drains: * No LDAs found *      Electronically signed by Hemalatha Curry MD on 12/29/2021 at 10:48 AM

## 2021-12-29 NOTE — ANESTHESIA PRE PROCEDURE
Department of Anesthesiology  Preprocedure Note       Name:  Carmel Shaw   Age:  73 y.o.  :  1955                                          MRN:  [id number]         Date:  2021      Surgeon: Gelacio Post):  Jenny Villegas MD    Procedure: Procedure(s):  LEFT FEMORAL POPLITEAL BYPASS    Medications prior to admission:   Prior to Admission medications    Medication Sig Start Date End Date Taking? Authorizing Provider   lisinopril (PRINIVIL;ZESTRIL) 5 MG tablet Take 1 tablet by mouth daily  Patient taking differently: Take 5 mg by mouth daily LD 21  Yes Bethany Milian MD   metoprolol succinate (TOPROL XL) 25 MG extended release tablet Take 1 tablet by mouth daily 10/21/21  Yes Bethany Milian MD   rosuvastatin (CRESTOR) 20 MG tablet Take 1 tablet by mouth daily 10/21/21  Yes Bethany Milian MD   aspirin 81 MG chewable tablet Take 81 mg by mouth daily.    Yes Historical Provider, MD       Current medications:    Current Facility-Administered Medications   Medication Dose Route Frequency Provider Last Rate Last Admin    lactated ringers infusion   IntraVENous Continuous Toan Reyes MD        sodium chloride flush 0.9 % injection 10 mL  10 mL IntraVENous 2 times per day Toan Reyes MD        sodium chloride flush 0.9 % injection 10 mL  10 mL IntraVENous PRN Toan Reyes MD        0.9 % sodium chloride infusion  25 mL IntraVENous PRN Toan Reyes MD        famotidine (PEPCID) injection 20 mg  20 mg IntraVENous Once Toan Reyes MD           Allergies:  No Known Allergies    Problem List:    Patient Active Problem List   Diagnosis Code    Severe claudication ([facility] [address] I73.9    Abnormal EKG R94.31       Past Medical History:        Diagnosis Date    CAD (coronary artery disease)     Hyperlipidemia     Hypertension     No longer taking meds    Thyroid disease     No longer taking meds       Past Surgical History:        Procedure ABGs: No results found for: PHART, PO2ART, EEY0TGV, AVP1IOW, BEART, X2TPKBVR     Type & Screen (If Applicable):  No results found for: LABABO, LABRH    Drug/Infectious Status (If Applicable):  No results found for: HIV, HEPCAB    COVID-19 Screening (If Applicable):   Lab Results   Component Value Date    COVID19 Not Detected 12/24/2021           Anesthesia Evaluation  Patient summary reviewed no history of anesthetic complications:   Airway: Mallampati: III  TM distance: <3 FB   Neck ROM: full  Mouth opening: < 3 FB Dental:    (+) upper dentures  Comment: SCATTERED BOTTOM TEETH    Pulmonary: breath sounds clear to auscultation  (+) shortness of breath (EXERT):  current smoker (2 PPD)    (-) COPD, asthma, recent URI and sleep apnea          Patient smoked on day of surgery. Cardiovascular:    (+) hypertension:, past MI (2009): > 6 months, CAD: obstructive, CABG/stent (3V CABG, 2009):, HARRY:, hyperlipidemia    (-) pacemaker, dysrhythmias and  angina    ECG reviewed  Rhythm: regular  Rate: normal  Echocardiogram reviewed  Stress test reviewed       Beta Blocker:  Dose within 24 Hrs         Neuro/Psych:      (-) seizures, TIA, CVA and psychiatric history           GI/Hepatic/Renal: Neg GI/Hepatic/Renal ROS       (-) GERD, liver disease, no renal disease, bowel prep and no morbid obesity       Endo/Other:    (+) : arthritis:., no malignancy/cancer. (-) diabetes mellitus, hypothyroidism, hyperthyroidism, blood dyscrasia, no malignancy/cancer               Abdominal:       Abdomen: soft. Vascular:   + PVD, aortic or cerebral, . Other Findings:             Anesthesia Plan      general     ASA 3     (? SEC. PIV., YUEN, ART LINE)  Induction: intravenous. MIPS: Postoperative opioids intended and Prophylactic antiemetics administered. Anesthetic plan and risks discussed with patient. Use of blood products discussed with patient whom consented to blood products.    Plan discussed with CRNA.              This pre-anesthesia assessment may be used as a history and physical.    DOS STAFF ADDENDUM:    Pt seen and examined, chart reviewed (including anesthesia, drug and allergy history). No interval changes to history and physical examination. Anesthetic plan, risks, benefits, alternatives, and personnel involved discussed with patient. Patient verbalized an understanding and agrees to proceed.       Loyd Alexander MD  December 29, 2021  6:27 AM      Loyd Alexander MD   12/29/2021

## 2021-12-29 NOTE — PROGRESS NOTES
Teaching / education initiated regarding perioperative experience, expectations, and pain management during stay. Patient verbalized understanding. Denies pain, states he does get SOB with activity, no oxygen at home. Current smoker, stated he did smoke this morning. Took BB this morning. Wife at bedside. Call light in reach.

## 2021-12-30 VITALS
RESPIRATION RATE: 18 BRPM | HEART RATE: 63 BPM | DIASTOLIC BLOOD PRESSURE: 87 MMHG | HEIGHT: 68 IN | BODY MASS INDEX: 33.85 KG/M2 | OXYGEN SATURATION: 92 % | SYSTOLIC BLOOD PRESSURE: 176 MMHG | WEIGHT: 223.33 LBS | TEMPERATURE: 97.6 F

## 2021-12-30 LAB
ANION GAP SERPL CALCULATED.3IONS-SCNC: 11 MMOL/L (ref 3–16)
BUN BLDV-MCNC: 19 MG/DL (ref 7–20)
CALCIUM SERPL-MCNC: 9 MG/DL (ref 8.3–10.6)
CHLORIDE BLD-SCNC: 99 MMOL/L (ref 99–110)
CO2: 26 MMOL/L (ref 21–32)
CREAT SERPL-MCNC: 0.6 MG/DL (ref 0.8–1.3)
GFR AFRICAN AMERICAN: >60
GFR NON-AFRICAN AMERICAN: >60
GLUCOSE BLD-MCNC: 157 MG/DL (ref 70–99)
HCT VFR BLD CALC: 44.7 % (ref 40.5–52.5)
HEMOGLOBIN: 15.2 G/DL (ref 13.5–17.5)
MCH RBC QN AUTO: 31.7 PG (ref 26–34)
MCHC RBC AUTO-ENTMCNC: 33.9 G/DL (ref 31–36)
MCV RBC AUTO: 93.5 FL (ref 80–100)
PDW BLD-RTO: 13.9 % (ref 12.4–15.4)
PLATELET # BLD: 198 K/UL (ref 135–450)
PMV BLD AUTO: 7.3 FL (ref 5–10.5)
POTASSIUM REFLEX MAGNESIUM: 4 MMOL/L (ref 3.5–5.1)
RBC # BLD: 4.78 M/UL (ref 4.2–5.9)
SODIUM BLD-SCNC: 136 MMOL/L (ref 136–145)
WBC # BLD: 9.5 K/UL (ref 4–11)

## 2021-12-30 PROCEDURE — 2580000003 HC RX 258: Performed by: SURGERY

## 2021-12-30 PROCEDURE — 85027 COMPLETE CBC AUTOMATED: CPT

## 2021-12-30 PROCEDURE — 6370000000 HC RX 637 (ALT 250 FOR IP): Performed by: SURGERY

## 2021-12-30 PROCEDURE — 97116 GAIT TRAINING THERAPY: CPT

## 2021-12-30 PROCEDURE — 80048 BASIC METABOLIC PNL TOTAL CA: CPT

## 2021-12-30 PROCEDURE — 97161 PT EVAL LOW COMPLEX 20 MIN: CPT

## 2021-12-30 PROCEDURE — 36415 COLL VENOUS BLD VENIPUNCTURE: CPT

## 2021-12-30 RX ORDER — OXYCODONE HYDROCHLORIDE 5 MG/1
5 TABLET ORAL EVERY 4 HOURS PRN
Qty: 28 TABLET | Refills: 0 | Status: SHIPPED | OUTPATIENT
Start: 2021-12-30 | End: 2022-01-02

## 2021-12-30 RX ADMIN — Medication 10 ML: at 08:17

## 2021-12-30 RX ADMIN — ROSUVASTATIN CALCIUM 20 MG: 10 TABLET, FILM COATED ORAL at 08:17

## 2021-12-30 RX ADMIN — LISINOPRIL 5 MG: 5 TABLET ORAL at 08:18

## 2021-12-30 RX ADMIN — METOPROLOL SUCCINATE 25 MG: 25 TABLET, EXTENDED RELEASE ORAL at 08:17

## 2021-12-30 RX ADMIN — ASPIRIN 81 MG 81 MG: 81 TABLET ORAL at 08:17

## 2021-12-30 ASSESSMENT — PAIN SCALES - GENERAL
PAINLEVEL_OUTOF10: 0
PAINLEVEL_OUTOF10: 0

## 2021-12-30 NOTE — OP NOTE
[facility] [address]                                OPERATIVE REPORT    PATIENT NAME: Savannah Cleveland                   :        1955  MED REC NO:   [id number]                          ROOM:       4906  ACCOUNT NO:   [de-identified]                           ADMIT DATE: 2021  PROVIDER:     Manuelito Jesus MD    DATE OF PROCEDURE:  2021    PREOPERATIVE DIAGNOSIS:  Ischemic rest pain of left lower extremity. POSTOPERATIVE DIAGNOSIS:  Ischemic rest pain of left lower extremity. PROCEDURES PERFORMED:  Left femoral, proximal superficial femoral artery  to below-knee popliteal in situ bypass. SURGEON:  Manuelito Jesus MD    ANESTHESIA:  General.    INDICATIONS:  The patient is 43-year-old male with severe claudication  and ischemic rest pain symptoms bilaterally secondary to popliteal  artery disease. He had thrombosed popliteal artery aneurysm on the left  with some resultant showering to the tibial vessels. He is brought to  the operating room at this time to undergo femoral to popliteal bypass. PROCEDURE:  The patient was brought to the operating room and placed in  the supine position. General endotracheal anesthesia induced. After  adequate anesthesia, the left lower extremity was prepped and draped in  sterile fashion. Side branches were ligated and divided through several  skip incisions in the mid thigh region. The below-knee incision was  made medially, the saphenous vein identified and the side branches  ligated and divided. The gastroc muscle was reflected posteriorly and  the below-knee popliteal space entered. The below-knee popliteal artery  was identified where it was normal, proximally it was a dilated and  thrombosed.   Proximally in the upper thigh incision, the fascia was  incised and the sartorius muscle was retracted laterally, exposing the  superficial femoral artery which was patent at this level. It was  mobilized for several centimeters and encircled with vessel loops. The  patient was given 5000 units of intravenous heparin. After  approximately 3 minutes, the flow in the proximal superficial femoral  artery was occluded constricting the vessel loops. The saphenous vein  was ligated close to the saphenofemoral junction. It was cut to the  appropriate length and spatulated posteriorly. A longitudinal  arteriotomy was made in the superficial femoral artery and the proximal  anastomosis performed using running 6-0 Prolene suture. After  completing the anastomosis, flow was established through the native  circulation and into the vein graft. The vein was ligated in the  mid-calf region and divided. A LeMaitre self-expanding valvulotome was  passed retrograde up the saphenous vein and then pulled through the  vein, stripping the valves establishing excellent arterial outflow. Flow in the popliteal artery was occluded constricting the vessel loops. Longitudinal arteriotomy was made and the below-knee anastomosis was  fashioned using running 6-0 Prolene suture as well. The popliteal  artery just below the aneurysm was ligated with 0 silk tie to prevent  any retrograde perfusion into the aneurysm; however, this was  thrombosed. Once the anastomosis was completed, flow was established. There was an excellent pulse in the vein graft, excellent Doppler  signals. The vein proximally was punctured with a 20-gauge  angiocatheter and angiogram was performed. Several retained side  branches from the saphenous vein graft were identified and these were  ligated and divided using hemoclips and silk ties. Repeat angiograms  showed widely patent anastomosis with excellent flow into the below-knee  popliteal artery which in fact three-vessel runoff below the knee. The  angiocatheter was removed. The puncture site closed using a 6-0 Prolene  suture.   The incisions were irrigated with antibiotic saline solution  and closed using multiple layers of running 2-0 Vicryl suture, 3-0  Vicryl suture, then running 4-0 Monocryl subcuticular sutures were used  to reapproximate the skin edges. The wounds were dressed with Dermabond  and Prineo. There were no complications of procedure. At the end of  procedure, sponge, needle and instrument counts were correct. Estimated  blood loss less than 100 mL. The patient was transferred to the  recovery area in a stable condition following this procedure this.         Melissa Banegas MD    D: 12/29/2021 16:46:51       T: 12/29/2021 16:49:27     [id number]  Job#: [id number]     Doc#: [id number]    CC:

## 2021-12-30 NOTE — PROGRESS NOTES
A: TC to [facility] to verify transmission of tele monitor which was confirmed. Bedside report given to [facility] (WOMEN), all current drips, treatments, skin issues, and plan of care were reviewed by both RN's, patient transferred in stable condition and with all belongings.

## 2021-12-30 NOTE — DISCHARGE SUMMARY
Physician Discharge Summary     Patient ID:  Regan Pickard  [id number]  89 y.o.  1955    Admit date: 12/29/2021    Discharge date and time: 12/30/2021 11:24 AM     Admitting Physician: Trini Barker MD     Discharge Physician: same    Admission Diagnoses: Peripheral vascular disease, unspecified ([facility] [address] [I73.9]  Ischemic rest pain of lower extremity [M79.606, I99.8]    Discharge Diagnoses: same    Admission Condition: good    Discharged Condition: good    Indication for Admission: Admitted to undergo LLE fem-pop bypass. Hospital Course: Admitted, taken to OR where above performed. Post-op course routine and unremarkable. Disposition: home    In process/preliminary results:  Outstanding Order Results     No orders found for last 30 day(s). Patient Instructions:   Discharge Medication List as of 12/30/2021 10:10 AM      START taking these medications    Details   oxyCODONE (ROXICODONE) 5 MG immediate release tablet Take 1 tablet by mouth every 4 hours as needed for Pain for up to 3 days. , Disp-28 tablet, R-0Normal         CONTINUE these medications which have NOT CHANGED    Details   lisinopril (PRINIVIL;ZESTRIL) 5 MG tablet Take 1 tablet by mouth daily, Disp-90 tablet, R-1Normal      metoprolol succinate (TOPROL XL) 25 MG extended release tablet Take 1 tablet by mouth daily, Disp-30 tablet, R-3Normal      rosuvastatin (CRESTOR) 20 MG tablet Take 1 tablet by mouth daily, Disp-30 tablet, R-5Normal      aspirin 81 MG chewable tablet Take 81 mg by mouth daily. Activity: activity as tolerated  Diet: regular diet  Wound Care: keep wound clean and dry    Follow-up with Dr Winter Parks in 2 weeks. Signed:   Trini Barker MD  12/30/2021  4:03 PM

## 2021-12-30 NOTE — PROGRESS NOTES
Physical Therapy    Facility/Department: [facility]  Initial Assessment/DC summary     NAME: Alan Parsons  : 1955  MRN: [id number]    Date of Service: 2021    Discharge Recommendations:  Home with assist PRN   PT Equipment Recommendations  Equipment Needed: No    Assessment   Body structures, Functions, Activity limitations: Decreased functional mobility   Assessment: Pt functioning slightly below baseline s/p bypass. Pt was able to demonstrate safe mobility without use of AD, no LOB or significant increase in pain. Returned to bed at [facility]. No further PT needs indicated. Prognosis: Excellent  Decision Making: Low Complexity  PT Education: Goals;Gait Training;Disease Specific Education  Patient Education: Pt expressed understanding on role of PT  Barriers to Learning: none  No Skilled PT: Safe to return home  REQUIRES PT FOLLOW UP: No  Activity Tolerance  Activity Tolerance: Patient Tolerated treatment well  Activity Tolerance: 159/75, 57 HR, 94%       Patient Diagnosis(es): The encounter diagnosis was Ischemic rest pain of lower extremity. has a past medical history of CAD (coronary artery disease), Hyperlipidemia, Hypertension, and Thyroid disease. has a past surgical history that includes Arterial bypass surgry and Cardiac surgery (). Restrictions  Restrictions/Precautions  Restrictions/Precautions: Fall Risk  Vision/Hearing  Vision: Impaired  Vision Exceptions: Wears glasses for distance  Hearing: Within functional limits     Subjective  General  Chart Reviewed: Yes  Patient assessed for rehabilitation services?: Yes  Response To Previous Treatment: Not applicable  Family / Caregiver Present: No  Referring Practitioner: Keily Lincoln MD  Referral Date : 21  Diagnosis: Left femoral, proximal superficial femoral arteryto below-knee popliteal in situ bypass  Follows Commands: Within Functional Limits  Subjective  Subjective: Pt resting in bed.  Denies pain  Pain Screening  Patient Currently in Pain: Denies          Orientation  Orientation  Overall Orientation Status: Within Normal Limits  Social/Functional History  Social/Functional History  Lives With: Spouse  Type of Home: House  Home Layout: One level  Home Access: Stairs to enter with rails  Entrance Stairs - Number of Steps: 2  Bathroom Shower/Tub: Tub/Shower unit  Bathroom Toilet: Standard  ADL Assistance: Independent  Homemaking Assistance: Independent  Homemaking Responsibilities: Yes  Ambulation Assistance: Independent (no DME)  Transfer Assistance: Independent  Active : Yes  Occupation: On disability  Cognition        Objective          PROM RLE (degrees)  RLE PROM: WFL  AROM RLE (degrees)  RLE AROM: WFL  PROM LLE (degrees)  LLE General PROM: limited by pain  Strength RLE  Strength RLE: WFL  Strength LLE  Strength LLE: WFL  Tone RLE  RLE Tone: Normotonic  Tone LLE  LLE Tone: Normotonic  Motor Control  Gross Motor?: WFL  Sensation  Overall Sensation Status: WNL  Bed mobility  Supine to Sit: Independent  Sit to Supine: Independent  Transfers  Sit to Stand: Independent  Stand to sit: Independent  Stand Pivot Transfers: Independent  Ambulation  Ambulation?: Yes  More Ambulation?: No  Ambulation 1  Surface: level tile  Device: No Device  Assistance: Independent  Gait Deviations: Slow Fabby; Increased QUINN  Distance: 100'     Balance  Posture: Good  Sitting - Static: Good  Sitting - Dynamic: Good  Standing - Static: Good  Standing - Dynamic: Good;-        Plan   Plan  Times per week: DC  Safety Devices  Type of devices: Gait belt,Call light within reach  Restraints  Initially in place: No      Goals  Short term goals  Time Frame for Short term goals: 1 session  Short term goal 1: Pt will complete mobility independently - MET  Patient Goals   Patient goals : to go home       Therapy Time   Individual Concurrent Group Co-treatment   Time In 0937         Time Out 0955         Minutes 18         Timed Code Treatment Minutes: [address] [facility], PT

## 2021-12-30 NOTE — DISCHARGE INSTR - COC
Continuity of Care Form    Patient Name: Khanh Thompson   :  1955  MRN:  [id number]    Admit date:  2021  Discharge date:  ***    Code Status Order: Full Code   Advance Directives:   Advance Care Flowsheet Documentation       Date/Time Healthcare Directive Type of Healthcare Directive Copy in [address] Agent's Name Healthcare Agent's Phone Number    21 1445 No, patient does not have an advance directive for healthcare treatment -- -- -- -- --            Admitting Physician: Yo Mcghee MD  PCP: Shila Reeder APRN - CNP    Discharging Nurse: [facility] Unit/Room#: 1644/1036-13  Discharging Unit Phone Number: ***    Emergency Contact:   Extended Emergency Contact Information  Primary Emergency Contact: Adriana Gomesty  Address: [address] [place] [address] [address] Phone: [phone]  Mobile Phone: [phone]  Relation: Spouse    Past Surgical History:  Past Surgical History:   Procedure Laterality Date    ARTERIAL BYPASS SURGRY      triple in     CARDIAC SURGERY  2011    CABG x3 vessel       Immunization History: There is no immunization history for the selected administration types on file for this patient.     Active Problems:  Patient Active Problem List   Diagnosis Code    Severe claudication (HCC) I73.9    Abnormal EKG R94.31    Ischemic rest pain of lower extremity M79.606, I99.8       Isolation/Infection:   Isolation            No Isolation          Patient Infection Status       Infection Onset Added Last Indicated Last Indicated By Review Planned Expiration Resolved Resolved By    None active    Resolved    COVID-19 (Rule Out) 21 COVID-19 (Ordered)   21 Bryant Kendrick RN    21 test result negative    COVID-19 (Rule Out) 21 COVID-19 (Ordered)   21 Rule-Out Test Resulted    COVID-19 (Rule Out) 11/15/21 11/15/21 11/15/21 COVID-19 (Ordered)   11/16/21 Rule-Out Test Resulted    COVID-19 (Rule Out) 10/19/21 10/19/21 10/19/21 COVID-19 (Ordered)   10/19/21 Rule-Out Test Resulted            Nurse Assessment:  Last Vital Signs: BP (!) 176/87   Pulse 63   Temp 97.6 °F (36.4 °C) (Oral)   Resp 18   Ht 5' 8\" (1.727 m)   Wt 223 lb 5.2 oz (101.3 kg)   SpO2 92%   BMI 33.96 kg/m²     Last documented pain score (0-10 scale): Pain Level: 0  Last Weight:   Wt Readings from Last 1 Encounters:   12/30/21 223 lb 5.2 oz (101.3 kg)     Mental Status:  {IP PT MENTAL STATUS:20030}    IV Access:  { BLANCO IV ACCESS:946780773}    Nursing Mobility/ADLs:  Walking   {CHP DME ARAA:470969629}  Transfer  {CHP DME MBQQ:407944322}  Bathing  {CHP DME XBHJ:886538633}  Dressing  {CHP DME YFEJ:320132429}  Toileting  {CHP DME GIDV:832538624}  Feeding  {P DME ZWLV:660014618}  Med Admin  {CHP DME QXIO:165630617}  Med Delivery   { BLANCO MED Delivery:097024683}    Wound Care Documentation and Therapy:        Elimination:  Continence: Bowel: {YES / ZB:60470}  Bladder: {YES / ZT:66181}  Urinary Catheter: {Urinary Catheter:394343284}   Colostomy/Ileostomy/Ileal Conduit: {YES / LD:82024}       Date of Last BM: ***    Intake/Output Summary (Last 24 hours) at 12/30/2021 0958  Last data filed at 12/30/2021 0556  Gross per 24 hour   Intake 1240 ml   Output 1200 ml   Net 40 ml     I/O last 3 completed shifts: In: 3789 [P.O.:240;  I.V.:1000; IV Piggyback:100]  Out: 1400 [Urine:1350; Blood:50]    Safety Concerns:     [address] [employer] Safety Concerns:427157544}    Impairments/Disabilities:      [address] [employer] Impairments/Disabilities:293314353}    Nutrition Therapy:  Current Nutrition Therapy:   [address] BLANCO Diet List:155646369}    Routes of Feeding: {CHP DME Other Feedings:204652259}  Liquids: {Slp liquid thickness:62783}  Daily Fluid Restriction: {CHP DME Yes amt example:060312534}  Last Modified Barium Swallow with Video (Video Swallowing Test): {Done Not Done

## 2021-12-30 NOTE — PROGRESS NOTES
Discharge order noted iv hub and tele discontinued reviewed instruction with patient verbalizes understanding of instruction and follow up care signs and symptoms to report to Dr Britni Munoz.

## 2022-01-03 NOTE — TELEPHONE ENCOUNTER
Pt is needing a refill on his metoprolol and please send to [id number] [place] [address] - F [phone]   [address]   Phone:  [phone]  Fax:  [phone]    (Evelyne Mann)

## 2022-01-04 RX ORDER — METOPROLOL SUCCINATE 25 MG/1
25 TABLET, EXTENDED RELEASE ORAL DAILY
Qty: 90 TABLET | Refills: 0 | Status: SHIPPED | OUTPATIENT
Start: 2022-01-04 | End: 2022-08-01

## 2022-01-14 ENCOUNTER — OFFICE VISIT (OUTPATIENT)
Dept: VASCULAR SURGERY | Age: 67
End: 2022-01-14

## 2022-01-14 VITALS
SYSTOLIC BLOOD PRESSURE: 142 MMHG | DIASTOLIC BLOOD PRESSURE: 80 MMHG | BODY MASS INDEX: 34.56 KG/M2 | WEIGHT: 228 LBS | HEIGHT: 68 IN

## 2022-01-14 DIAGNOSIS — Z09 POSTOPERATIVE EXAMINATION: Primary | ICD-10-CM

## 2022-01-14 PROCEDURE — 99024 POSTOP FOLLOW-UP VISIT: CPT | Performed by: SURGERY

## 2022-01-14 NOTE — PROGRESS NOTES
Postop Progress Note    Subjective    Regan Pickard presents to the office for postop follow up. Reports leg swelling but no foot pain. Objective    Vitals:    01/14/22 0945   BP: (!) 142/80     General: alert, cooperative and no distress  Incisions: healing well  Excellent graft and DP/PT doppler signals. Assessment  Doing well postoperatively. Post-op edema    Plan    Discussed importance of leg elevation. Leg wrapped with ACE. F/u 2 weeks. Eventual RLE surgery once recovered from left.      Electronically signed by Trini Barker MD on 1/14/2022 at 10:20 AM

## 2022-02-11 ENCOUNTER — OFFICE VISIT (OUTPATIENT)
Dept: VASCULAR SURGERY | Age: 67
End: 2022-02-11

## 2022-02-11 VITALS
SYSTOLIC BLOOD PRESSURE: 182 MMHG | HEIGHT: 62 IN | DIASTOLIC BLOOD PRESSURE: 90 MMHG | BODY MASS INDEX: 41.96 KG/M2 | WEIGHT: 228 LBS

## 2022-02-11 DIAGNOSIS — Z09 POSTOPERATIVE EXAMINATION: Primary | ICD-10-CM

## 2022-02-11 PROCEDURE — 99024 POSTOP FOLLOW-UP VISIT: CPT | Performed by: SURGERY

## 2022-02-11 NOTE — PROGRESS NOTES
Postop Progress Note    Subjective    Khanh Thompson presents to the office for postop follow up. Doing well. Walking improved, until RLE start to hurt. Mild pain R proximal thigh incision    Objective    Vitals:    02/11/22 0847   BP: (!) 182/90     General: alert, cooperative and no distress  Incisions: healing well, small seroma under R groin incision. Palp graft and DP/PT pulses    Assessment  Doing well postoperatively. Needs RLE bypass/excluision small pop aneurysm. Small AAA      Plan:    Patient will call when he is ready to schedule RLE surgery. Will need eventual non contrast CT of abdomen to evaluate AAA.         Electronically signed by Yo Mcghee MD on 2/11/2022 at 9:00 AM

## 2022-02-22 ENCOUNTER — TELEPHONE (OUTPATIENT)
Dept: VASCULAR SURGERY | Age: 67
End: 2022-02-22

## 2022-03-01 ENCOUNTER — TELEPHONE (OUTPATIENT)
Dept: VASCULAR SURGERY | Age: 67
End: 2022-03-01

## 2022-03-01 DIAGNOSIS — Z01.818 PRE-OP TESTING: Primary | ICD-10-CM

## 2022-03-01 DIAGNOSIS — I73.9 PERIPHERAL VASCULAR DISEASE, UNSPECIFIED (HCC): Primary | ICD-10-CM

## 2022-03-01 NOTE — TELEPHONE ENCOUNTER
Called patient regarding apt for vein mapping/marking at [place] for tomorrow at 10:00am/arrival of 9:30am. He is to get his PAT and covid test at that time.  angelica

## 2022-03-02 ENCOUNTER — PREP FOR PROCEDURE (OUTPATIENT)
Dept: VASCULAR SURGERY | Age: 67
End: 2022-03-02

## 2022-03-02 DIAGNOSIS — Z01.818 PREOP TESTING: Primary | ICD-10-CM

## 2022-03-02 RX ORDER — SODIUM CHLORIDE 0.9 % (FLUSH) 0.9 %
5-40 SYRINGE (ML) INJECTION PRN
Status: CANCELLED | OUTPATIENT
Start: 2022-03-02

## 2022-03-02 RX ORDER — SODIUM CHLORIDE 9 MG/ML
25 INJECTION, SOLUTION INTRAVENOUS PRN
Status: CANCELLED | OUTPATIENT
Start: 2022-03-02

## 2022-03-02 RX ORDER — SODIUM CHLORIDE 0.9 % (FLUSH) 0.9 %
5-40 SYRINGE (ML) INJECTION EVERY 12 HOURS SCHEDULED
Status: CANCELLED | OUTPATIENT
Start: 2022-03-02

## 2022-03-02 NOTE — PROGRESS NOTES
Sindi Renick    Age 78 y.o.    male    1955    MRN [id number]    3/10/2022  Arrival Time_____________  OR Time____________232 Min     Procedure(s):  RIGHT BELOW THE KNEE POPLITEAL INSITU BYPASS GRAFT                      General     Surgeon(s):  Marissa Corona, MD      DAY ADMIT ___  SDS/OP ___  OUTPT IN BED ___         Phone [phone] (home)    PCP _____________________ Phone_________________ Epic ( ) Epic CE ( ) Appt ________    ADDITIONAL INFO __________________________________ Cardio/Consult _____________    NOTES _____________________________________________________________________    ____________________________________________________________________________    PAT APPT DATE:________ TIME: ________  FAXED QAD: _______  (__) H&P w/ hospitalist  ____________________________________________________________________________    COVID TEST: Date/Location______________        NURSING HISTORY COMPLETE: _______  (__) CBC       (__) W/ DIFF ___________  (__)  ECHO    __________  (__) Hgb A1C    ___________  (__) CHEST X RAY   __________  (__) LIPID PROFILE  ___________  (__) EKG   __________  (__) PT/PTT   ___________  (__) PFT's   __________  (__) BMP   ___________  (__) CAROTIDS  __________  (__) CMP   ___________  (__) VEIN MAPPING  __________  (__) U/A   ___________  (__) HISTORY & PHYSICAL __________  (__) URINE C & S  ___________  (__) CARDIAC CLEARANCE __________  (__) U/A W/ FLEX  ___________  (__) PULM.  CLEARANCE __________  (__) SERUM PREGNANCY ___________  (__) Check Epic DOS orders __________  (__) TYPE & SCREEN ________ repeat ( ) (__)  __________________ __________  (__) ALBUMIN   ___________  (__)  __________________ __________  (__) TRANSFERRIN  ___________  (__)  __________________ __________  (__) LIVER PROFILE  ___________  (__)  __________________ __________  (__) CARBOXY HGB  ___________  (__) URINE PREG DOS __________  (__) NICOTINE & MET.  ___________  (__) BLOOD SUGAR DOS __________  (__) PREALBUMIN  ___________    (__) MRSA NASAL SWAB ___________  (__) BLOOD THINNERS __________  (__) ACE/ ARBS: _____________________    (__) BETABLOCKERS ___________________

## 2022-03-03 ENCOUNTER — ANESTHESIA EVENT (OUTPATIENT)
Dept: OPERATING ROOM | Age: 67
DRG: 254 | End: 2022-03-03
Payer: MEDICARE

## 2022-03-07 ENCOUNTER — HOSPITAL ENCOUNTER (OUTPATIENT)
Dept: PREADMISSION TESTING | Age: 67
DRG: 254 | End: 2022-03-07
Payer: MEDICARE

## 2022-03-07 ENCOUNTER — HOSPITAL ENCOUNTER (OUTPATIENT)
Dept: VASCULAR LAB | Age: 67
Discharge: HOME | DRG: 254 | End: 2022-03-07
Payer: MEDICARE

## 2022-03-07 ENCOUNTER — HOSPITAL ENCOUNTER (OUTPATIENT)
Dept: PREADMISSION TESTING | Age: 67
Setting detail: SURGERY ADMIT
Discharge: HOME | DRG: 254 | End: 2022-03-11
Attending: SURGERY
Payer: MEDICARE

## 2022-03-07 DIAGNOSIS — Z01.818 PRE-OP TESTING: ICD-10-CM

## 2022-03-07 LAB
ANION GAP SERPL CALCULATED.3IONS-SCNC: 13 MMOL/L (ref 3–16)
BASOPHILS ABSOLUTE: 0.1 K/UL (ref 0–0.2)
BASOPHILS RELATIVE PERCENT: 1.4 %
BUN BLDV-MCNC: 11 MG/DL (ref 7–20)
CALCIUM SERPL-MCNC: 9.6 MG/DL (ref 8.3–10.6)
CHLORIDE BLD-SCNC: 100 MMOL/L (ref 99–110)
CO2: 26 MMOL/L (ref 21–32)
CREAT SERPL-MCNC: 0.8 MG/DL (ref 0.8–1.3)
EKG ATRIAL RATE: 61 BPM
EKG DIAGNOSIS: NORMAL
EKG P AXIS: 48 DEGREES
EKG P-R INTERVAL: 200 MS
EKG Q-T INTERVAL: 430 MS
EKG QRS DURATION: 108 MS
EKG QTC CALCULATION (BAZETT): 432 MS
EKG R AXIS: -23 DEGREES
EKG T AXIS: 138 DEGREES
EKG VENTRICULAR RATE: 61 BPM
EOSINOPHILS ABSOLUTE: 0.1 K/UL (ref 0–0.6)
EOSINOPHILS RELATIVE PERCENT: 2 %
GFR AFRICAN AMERICAN: >60
GFR NON-AFRICAN AMERICAN: >60
GLUCOSE BLD-MCNC: 131 MG/DL (ref 70–99)
HCT VFR BLD CALC: 43.9 % (ref 40.5–52.5)
HEMOGLOBIN: 14.8 G/DL (ref 13.5–17.5)
LYMPHOCYTES ABSOLUTE: 2 K/UL (ref 1–5.1)
LYMPHOCYTES RELATIVE PERCENT: 34.8 %
MCH RBC QN AUTO: 31.1 PG (ref 26–34)
MCHC RBC AUTO-ENTMCNC: 33.6 G/DL (ref 31–36)
MCV RBC AUTO: 92.5 FL (ref 80–100)
MONOCYTES ABSOLUTE: 0.6 K/UL (ref 0–1.3)
MONOCYTES RELATIVE PERCENT: 10.3 %
NEUTROPHILS ABSOLUTE: 3 K/UL (ref 1.7–7.7)
NEUTROPHILS RELATIVE PERCENT: 51.5 %
PDW BLD-RTO: 14.1 % (ref 12.4–15.4)
PLATELET # BLD: 291 K/UL (ref 135–450)
PMV BLD AUTO: 6.9 FL (ref 5–10.5)
POTASSIUM SERPL-SCNC: 4.5 MMOL/L (ref 3.5–5.1)
RBC # BLD: 4.75 M/UL (ref 4.2–5.9)
SODIUM BLD-SCNC: 139 MMOL/L (ref 136–145)
WBC # BLD: 5.8 K/UL (ref 4–11)

## 2022-03-07 PROCEDURE — 93971 EXTREMITY STUDY: CPT

## 2022-03-07 PROCEDURE — U0005 INFEC AGEN DETEC AMPLI PROBE: HCPCS

## 2022-03-07 PROCEDURE — 85025 COMPLETE CBC W/AUTO DIFF WBC: CPT

## 2022-03-07 PROCEDURE — 36415 COLL VENOUS BLD VENIPUNCTURE: CPT

## 2022-03-07 PROCEDURE — 93005 ELECTROCARDIOGRAM TRACING: CPT | Performed by: SURGERY

## 2022-03-07 PROCEDURE — 93010 ELECTROCARDIOGRAM REPORT: CPT | Performed by: INTERNAL MEDICINE

## 2022-03-07 PROCEDURE — 80048 BASIC METABOLIC PNL TOTAL CA: CPT

## 2022-03-07 PROCEDURE — U0003 INFECTIOUS AGENT DETECTION BY NUCLEIC ACID (DNA OR RNA); SEVERE ACUTE RESPIRATORY SYNDROME CORONAVIRUS 2 (SARS-COV-2) (CORONAVIRUS DISEASE [COVID-19]), AMPLIFIED PROBE TECHNIQUE, MAKING USE OF HIGH THROUGHPUT TECHNOLOGIES AS DESCRIBED BY CMS-2020-01-R: HCPCS

## 2022-03-07 NOTE — PROGRESS NOTES
1. Do not eat or drink anything after 12 midnight prior to surgery. This includes no water, chewing gum mints, or ice chips. You may brush your teeth and gargle the day of surgery but DO NOT SWALLOW THE WATER. 2. Please see your family doctor/pediatrician for a history and physical and/or concerning medications. Bring any test results/reports from your physician's office. If you are under the care of a heart doctor or specialist please be aware that you may be asked to see him or her for clearance. 3. You may be asked to stop blood thinners such as Coumadin, Plavix, Fragmin, and Lovenox or Anti-inflammatories such as Aspirin, Ibuprofen, Advil, and Naproxen prior to your surgery. Please check with your doctor before stopping these or any other medications. 4. Do not smoke, and do not drink any alcoholic beverages 24 hours prior to surgery. 5. You MUST make arrangements for a responsible adult to take you home after your surgery. For your safety, you will not be allowed to leave alone or drive yourself home. Your surgery will be cancelled if you do not have a ride home. Also for your safety, it is strongly suggested someone stay with you the first 24 hrs after your surgery. 6. A parent/legal guardian must accompany a child scheduled for surgery and plan to stay at the hospital until the child is discharged. Please do not bring other children with you. 7. For your comfort,please wear simple, loose fitting clothing to the hospital.  Please do not bring valuables (money, credit cards, checkbooks, etc.) Do not wear any makeup (including no eye makeup) or nail polish on your fingers or toes. 8. For your safety, please DO NOT wear any jewelry or piercings on day of surgery. All body piercing jewelry must be removed. 9. If you have dentures, they will be removed before going to the OR; for your convenience we will provide you with a container.   If you wear contact lenses or glasses, they will be removed, they will be removed, please bring a case for them. 10. If appicable,Please see your family doctor/pediatrician for a history & physical and/or concerning medications. Bring any test results/reports from your physician's office. 11. Remember to bring Blood Bank bracelet to the hospital on the day of surgery. 12. If you have a Living Will and Durable Power of  for Healthcare, please bring in a copy. 15. Notify your Surgeon if you develop any illness between now and surgery  time, cough, cold, fever, sore throat, nausea, vomiting, etc.  Please notify your surgeon if you experience dizziness, shortness of breath or blurred vision between now & the time of your surgery   14. DO NOT shave your operative site 96 hours prior to surgery. For face & neck surgery, men may use an electric razor 48 hours prior to surgery. 15. Shower the night before surgery with _X__Antibacterial soap ___Hibiclens   16. To provide excellent care visitors will be limited to one in the room at any given time. 17.  Please bring picture ID and insurance card. 18.  Visit our web site for additional information:  Done./surgery.          INSTRUCTED LAST DOSE FOR LISINOPRIL 3/8/22 AM AND TO TAKE METOPROLOL MORNING OF SURGERY WITH SMALL SIP   OF WATER PER ANESTHESIA REQUEST

## 2022-03-08 LAB — SARS-COV-2, PCR: NOT DETECTED

## 2022-03-10 ENCOUNTER — HOSPITAL ENCOUNTER (INPATIENT)
Age: 67
LOS: 1 days | Discharge: HOME | DRG: 254 | End: 2022-03-11
Attending: SURGERY | Admitting: SURGERY
Payer: MEDICARE

## 2022-03-10 ENCOUNTER — ANESTHESIA (OUTPATIENT)
Dept: OPERATING ROOM | Age: 67
DRG: 254 | End: 2022-03-10
Payer: MEDICARE

## 2022-03-10 ENCOUNTER — APPOINTMENT (OUTPATIENT)
Dept: GENERAL RADIOLOGY | Age: 67
DRG: 254 | End: 2022-03-10
Attending: SURGERY
Payer: MEDICARE

## 2022-03-10 VITALS
OXYGEN SATURATION: 94 % | TEMPERATURE: 97.2 F | SYSTOLIC BLOOD PRESSURE: 152 MMHG | RESPIRATION RATE: 1 BRPM | DIASTOLIC BLOOD PRESSURE: 83 MMHG

## 2022-03-10 DIAGNOSIS — Z01.818 PREOP TESTING: ICD-10-CM

## 2022-03-10 DIAGNOSIS — I73.9 PVD (PERIPHERAL VASCULAR DISEASE) WITH CLAUDICATION (HCC): Primary | ICD-10-CM

## 2022-03-10 LAB
ABO/RH: NORMAL
ANTIBODY SCREEN: NORMAL
ANTIBODY SCREEN: NORMAL
DAT IGG CAPTURE: NORMAL

## 2022-03-10 PROCEDURE — 2580000003 HC RX 258: Performed by: SURGERY

## 2022-03-10 PROCEDURE — 6360000002 HC RX W HCPCS: Performed by: SURGERY

## 2022-03-10 PROCEDURE — 2500000003 HC RX 250 WO HCPCS

## 2022-03-10 PROCEDURE — 73590 X-RAY EXAM OF LOWER LEG: CPT

## 2022-03-10 PROCEDURE — 2720000010 HC SURG SUPPLY STERILE: Performed by: SURGERY

## 2022-03-10 PROCEDURE — 86850 RBC ANTIBODY SCREEN: CPT

## 2022-03-10 PROCEDURE — 3700000000 HC ANESTHESIA ATTENDED CARE: Performed by: SURGERY

## 2022-03-10 PROCEDURE — 6360000004 HC RX CONTRAST MEDICATION: Performed by: SURGERY

## 2022-03-10 PROCEDURE — 86880 COOMBS TEST DIRECT: CPT

## 2022-03-10 PROCEDURE — 6370000000 HC RX 637 (ALT 250 FOR IP): Performed by: SURGERY

## 2022-03-10 PROCEDURE — A4217 STERILE WATER/SALINE, 500 ML: HCPCS | Performed by: SURGERY

## 2022-03-10 PROCEDURE — 86900 BLOOD TYPING SEROLOGIC ABO: CPT

## 2022-03-10 PROCEDURE — 3600000007 HC SURGERY HYBRID BASE: Performed by: SURGERY

## 2022-03-10 PROCEDURE — 7100000001 HC PACU RECOVERY - ADDTL 15 MIN: Performed by: SURGERY

## 2022-03-10 PROCEDURE — 2580000003 HC RX 258: Performed by: ANESTHESIOLOGY

## 2022-03-10 PROCEDURE — 2500000003 HC RX 250 WO HCPCS: Performed by: ANESTHESIOLOGY

## 2022-03-10 PROCEDURE — 6360000002 HC RX W HCPCS

## 2022-03-10 PROCEDURE — 2580000003 HC RX 258

## 2022-03-10 PROCEDURE — 3700000001 HC ADD 15 MINUTES (ANESTHESIA): Performed by: SURGERY

## 2022-03-10 PROCEDURE — 35583 VEIN BYP GRFT FEM-POPLITEAL: CPT | Performed by: SURGERY

## 2022-03-10 PROCEDURE — 3209999900 FLUORO FOR SURGICAL PROCEDURES

## 2022-03-10 PROCEDURE — 2060000000 HC ICU INTERMEDIATE R&B

## 2022-03-10 PROCEDURE — 7100000000 HC PACU RECOVERY - FIRST 15 MIN: Performed by: SURGERY

## 2022-03-10 PROCEDURE — 2709999900 HC NON-CHARGEABLE SUPPLY: Performed by: SURGERY

## 2022-03-10 PROCEDURE — B41F1ZZ FLUOROSCOPY OF RIGHT LOWER EXTREMITY ARTERIES USING LOW OSMOLAR CONTRAST: ICD-10-PCS | Performed by: SURGERY

## 2022-03-10 PROCEDURE — 3600000017 HC SURGERY HYBRID ADDL 15MIN: Performed by: SURGERY

## 2022-03-10 PROCEDURE — 86901 BLOOD TYPING SEROLOGIC RH(D): CPT

## 2022-03-10 PROCEDURE — 041K09L BYPASS RIGHT FEMORAL ARTERY TO POPLITEAL ARTERY WITH AUTOLOGOUS VENOUS TISSUE, OPEN APPROACH: ICD-10-PCS | Performed by: SURGERY

## 2022-03-10 RX ORDER — ONDANSETRON 2 MG/ML
INJECTION INTRAMUSCULAR; INTRAVENOUS PRN
Status: DISCONTINUED | OUTPATIENT
Start: 2022-03-10 | End: 2022-03-10 | Stop reason: SDUPTHER

## 2022-03-10 RX ORDER — SODIUM CHLORIDE 0.9 % (FLUSH) 0.9 %
10 SYRINGE (ML) INJECTION PRN
Status: DISCONTINUED | OUTPATIENT
Start: 2022-03-10 | End: 2022-03-10

## 2022-03-10 RX ORDER — HEPARIN SODIUM 1000 [USP'U]/ML
INJECTION, SOLUTION INTRAVENOUS; SUBCUTANEOUS PRN
Status: DISCONTINUED | OUTPATIENT
Start: 2022-03-10 | End: 2022-03-10 | Stop reason: SDUPTHER

## 2022-03-10 RX ORDER — DEXAMETHASONE SODIUM PHOSPHATE 4 MG/ML
INJECTION, SOLUTION INTRA-ARTICULAR; INTRALESIONAL; INTRAMUSCULAR; INTRAVENOUS; SOFT TISSUE PRN
Status: DISCONTINUED | OUTPATIENT
Start: 2022-03-10 | End: 2022-03-10 | Stop reason: SDUPTHER

## 2022-03-10 RX ORDER — MORPHINE SULFATE 2 MG/ML
2 INJECTION, SOLUTION INTRAMUSCULAR; INTRAVENOUS
Status: DISCONTINUED | OUTPATIENT
Start: 2022-03-10 | End: 2022-03-11 | Stop reason: HOSPADM

## 2022-03-10 RX ORDER — POTASSIUM CHLORIDE 7.45 MG/ML
10 INJECTION INTRAVENOUS PRN
Status: DISCONTINUED | OUTPATIENT
Start: 2022-03-10 | End: 2022-03-11 | Stop reason: HOSPADM

## 2022-03-10 RX ORDER — LIDOCAINE HYDROCHLORIDE 20 MG/ML
INJECTION, SOLUTION INFILTRATION; PERINEURAL PRN
Status: DISCONTINUED | OUTPATIENT
Start: 2022-03-10 | End: 2022-03-10 | Stop reason: SDUPTHER

## 2022-03-10 RX ORDER — SODIUM CHLORIDE 0.9 % (FLUSH) 0.9 %
10 SYRINGE (ML) INJECTION EVERY 12 HOURS SCHEDULED
Status: DISCONTINUED | OUTPATIENT
Start: 2022-03-10 | End: 2022-03-11 | Stop reason: HOSPADM

## 2022-03-10 RX ORDER — MEPERIDINE HYDROCHLORIDE 50 MG/ML
12.5 INJECTION INTRAMUSCULAR; INTRAVENOUS; SUBCUTANEOUS EVERY 5 MIN PRN
Status: DISCONTINUED | OUTPATIENT
Start: 2022-03-10 | End: 2022-03-10

## 2022-03-10 RX ORDER — SODIUM CHLORIDE 0.9 % (FLUSH) 0.9 %
10 SYRINGE (ML) INJECTION EVERY 12 HOURS SCHEDULED
Status: DISCONTINUED | OUTPATIENT
Start: 2022-03-10 | End: 2022-03-10

## 2022-03-10 RX ORDER — SODIUM CHLORIDE 9 MG/ML
25 INJECTION, SOLUTION INTRAVENOUS PRN
Status: DISCONTINUED | OUTPATIENT
Start: 2022-03-10 | End: 2022-03-10

## 2022-03-10 RX ORDER — METOPROLOL SUCCINATE 25 MG/1
25 TABLET, EXTENDED RELEASE ORAL DAILY
Status: DISCONTINUED | OUTPATIENT
Start: 2022-03-10 | End: 2022-03-11 | Stop reason: HOSPADM

## 2022-03-10 RX ORDER — ONDANSETRON 2 MG/ML
4 INJECTION INTRAMUSCULAR; INTRAVENOUS EVERY 6 HOURS PRN
Status: DISCONTINUED | OUTPATIENT
Start: 2022-03-10 | End: 2022-03-11 | Stop reason: HOSPADM

## 2022-03-10 RX ORDER — GLYCOPYRROLATE 0.2 MG/ML
INJECTION INTRAMUSCULAR; INTRAVENOUS PRN
Status: DISCONTINUED | OUTPATIENT
Start: 2022-03-10 | End: 2022-03-10 | Stop reason: SDUPTHER

## 2022-03-10 RX ORDER — SODIUM CHLORIDE, SODIUM LACTATE, POTASSIUM CHLORIDE, CALCIUM CHLORIDE 600; 310; 30; 20 MG/100ML; MG/100ML; MG/100ML; MG/100ML
INJECTION, SOLUTION INTRAVENOUS CONTINUOUS
Status: DISCONTINUED | OUTPATIENT
Start: 2022-03-10 | End: 2022-03-10

## 2022-03-10 RX ORDER — HYDRALAZINE HYDROCHLORIDE 20 MG/ML
10 INJECTION INTRAMUSCULAR; INTRAVENOUS
Status: DISCONTINUED | OUTPATIENT
Start: 2022-03-10 | End: 2022-03-11 | Stop reason: HOSPADM

## 2022-03-10 RX ORDER — SODIUM CHLORIDE 0.9 % (FLUSH) 0.9 %
10 SYRINGE (ML) INJECTION PRN
Status: DISCONTINUED | OUTPATIENT
Start: 2022-03-10 | End: 2022-03-11 | Stop reason: HOSPADM

## 2022-03-10 RX ORDER — MORPHINE SULFATE 4 MG/ML
4 INJECTION, SOLUTION INTRAMUSCULAR; INTRAVENOUS
Status: DISCONTINUED | OUTPATIENT
Start: 2022-03-10 | End: 2022-03-11 | Stop reason: HOSPADM

## 2022-03-10 RX ORDER — PROMETHAZINE HYDROCHLORIDE 25 MG/1
12.5 TABLET ORAL EVERY 6 HOURS PRN
Status: DISCONTINUED | OUTPATIENT
Start: 2022-03-10 | End: 2022-03-11 | Stop reason: HOSPADM

## 2022-03-10 RX ORDER — ONDANSETRON 2 MG/ML
4 INJECTION INTRAMUSCULAR; INTRAVENOUS EVERY 30 MIN PRN
Status: DISCONTINUED | OUTPATIENT
Start: 2022-03-10 | End: 2022-03-10

## 2022-03-10 RX ORDER — ASPIRIN 81 MG/1
81 TABLET, CHEWABLE ORAL DAILY
Status: DISCONTINUED | OUTPATIENT
Start: 2022-03-10 | End: 2022-03-11 | Stop reason: HOSPADM

## 2022-03-10 RX ORDER — OXYCODONE HYDROCHLORIDE 5 MG/1
5 TABLET ORAL EVERY 4 HOURS PRN
Status: DISCONTINUED | OUTPATIENT
Start: 2022-03-10 | End: 2022-03-11 | Stop reason: HOSPADM

## 2022-03-10 RX ORDER — MIDAZOLAM HYDROCHLORIDE 1 MG/ML
2 INJECTION INTRAMUSCULAR; INTRAVENOUS
Status: DISCONTINUED | OUTPATIENT
Start: 2022-03-10 | End: 2022-03-10

## 2022-03-10 RX ORDER — CLONIDINE HYDROCHLORIDE 0.1 MG/1
0.1 TABLET ORAL
Status: DISCONTINUED | OUTPATIENT
Start: 2022-03-10 | End: 2022-03-11 | Stop reason: HOSPADM

## 2022-03-10 RX ORDER — OXYCODONE HYDROCHLORIDE 5 MG/1
10 TABLET ORAL EVERY 4 HOURS PRN
Status: DISCONTINUED | OUTPATIENT
Start: 2022-03-10 | End: 2022-03-11 | Stop reason: HOSPADM

## 2022-03-10 RX ORDER — ROCURONIUM BROMIDE 10 MG/ML
INJECTION, SOLUTION INTRAVENOUS PRN
Status: DISCONTINUED | OUTPATIENT
Start: 2022-03-10 | End: 2022-03-10 | Stop reason: SDUPTHER

## 2022-03-10 RX ORDER — ROSUVASTATIN CALCIUM 10 MG/1
20 TABLET, COATED ORAL DAILY
Status: DISCONTINUED | OUTPATIENT
Start: 2022-03-10 | End: 2022-03-11 | Stop reason: HOSPADM

## 2022-03-10 RX ORDER — DIPHENHYDRAMINE HYDROCHLORIDE 50 MG/ML
6.25 INJECTION INTRAMUSCULAR; INTRAVENOUS
Status: DISCONTINUED | OUTPATIENT
Start: 2022-03-10 | End: 2022-03-10

## 2022-03-10 RX ORDER — SODIUM CHLORIDE 0.9 % (FLUSH) 0.9 %
5-40 SYRINGE (ML) INJECTION EVERY 12 HOURS SCHEDULED
Status: DISCONTINUED | OUTPATIENT
Start: 2022-03-10 | End: 2022-03-10

## 2022-03-10 RX ORDER — PROPOFOL 10 MG/ML
INJECTION, EMULSION INTRAVENOUS PRN
Status: DISCONTINUED | OUTPATIENT
Start: 2022-03-10 | End: 2022-03-10 | Stop reason: SDUPTHER

## 2022-03-10 RX ORDER — OXYCODONE HYDROCHLORIDE 5 MG/1
5 TABLET ORAL
Status: DISCONTINUED | OUTPATIENT
Start: 2022-03-10 | End: 2022-03-10

## 2022-03-10 RX ORDER — SODIUM CHLORIDE 9 MG/ML
25 INJECTION, SOLUTION INTRAVENOUS PRN
Status: DISCONTINUED | OUTPATIENT
Start: 2022-03-10 | End: 2022-03-11 | Stop reason: HOSPADM

## 2022-03-10 RX ORDER — FENTANYL CITRATE 50 UG/ML
INJECTION, SOLUTION INTRAMUSCULAR; INTRAVENOUS PRN
Status: DISCONTINUED | OUTPATIENT
Start: 2022-03-10 | End: 2022-03-10 | Stop reason: SDUPTHER

## 2022-03-10 RX ORDER — SODIUM CHLORIDE 9 MG/ML
INJECTION, SOLUTION INTRAVENOUS CONTINUOUS
Status: DISCONTINUED | OUTPATIENT
Start: 2022-03-10 | End: 2022-03-11 | Stop reason: HOSPADM

## 2022-03-10 RX ORDER — LISINOPRIL 5 MG/1
5 TABLET ORAL DAILY
Status: DISCONTINUED | OUTPATIENT
Start: 2022-03-10 | End: 2022-03-11 | Stop reason: HOSPADM

## 2022-03-10 RX ORDER — SODIUM CHLORIDE 0.9 % (FLUSH) 0.9 %
5-40 SYRINGE (ML) INJECTION PRN
Status: DISCONTINUED | OUTPATIENT
Start: 2022-03-10 | End: 2022-03-10

## 2022-03-10 RX ADMIN — SODIUM CHLORIDE, PRESERVATIVE FREE 10 ML: 5 INJECTION INTRAVENOUS at 21:43

## 2022-03-10 RX ADMIN — FAMOTIDINE 20 MG: 10 INJECTION, SOLUTION INTRAVENOUS at 06:15

## 2022-03-10 RX ADMIN — CEFAZOLIN 2000 MG: 10 INJECTION, POWDER, FOR SOLUTION INTRAVENOUS at 07:34

## 2022-03-10 RX ADMIN — HEPARIN SODIUM 5000 UNITS: 1000 INJECTION, SOLUTION INTRAVENOUS; SUBCUTANEOUS at 09:04

## 2022-03-10 RX ADMIN — ROCURONIUM BROMIDE 20 MG: 10 SOLUTION INTRAVENOUS at 08:38

## 2022-03-10 RX ADMIN — DEXAMETHASONE SODIUM PHOSPHATE 4 MG: 4 INJECTION, SOLUTION INTRAMUSCULAR; INTRAVENOUS at 07:34

## 2022-03-10 RX ADMIN — LIDOCAINE HYDROCHLORIDE 100 MG: 20 INJECTION, SOLUTION INFILTRATION; PERINEURAL at 07:34

## 2022-03-10 RX ADMIN — FENTANYL CITRATE 100 MCG: 50 INJECTION INTRAMUSCULAR; INTRAVENOUS at 08:00

## 2022-03-10 RX ADMIN — LISINOPRIL 5 MG: 5 TABLET ORAL at 15:53

## 2022-03-10 RX ADMIN — SUGAMMADEX 200 MG: 100 INJECTION, SOLUTION INTRAVENOUS at 10:45

## 2022-03-10 RX ADMIN — METOPROLOL SUCCINATE 25 MG: 25 TABLET, EXTENDED RELEASE ORAL at 15:53

## 2022-03-10 RX ADMIN — ROCURONIUM BROMIDE 20 MG: 10 SOLUTION INTRAVENOUS at 09:34

## 2022-03-10 RX ADMIN — ONDANSETRON 4 MG: 2 INJECTION INTRAMUSCULAR; INTRAVENOUS at 07:34

## 2022-03-10 RX ADMIN — HYDRALAZINE HYDROCHLORIDE 10 MG: 20 INJECTION INTRAMUSCULAR; INTRAVENOUS at 11:28

## 2022-03-10 RX ADMIN — OXYCODONE 5 MG: 5 TABLET ORAL at 12:17

## 2022-03-10 RX ADMIN — SODIUM CHLORIDE, PRESERVATIVE FREE 10 ML: 5 INJECTION INTRAVENOUS at 17:46

## 2022-03-10 RX ADMIN — PROPOFOL 200 MG: 10 INJECTION, EMULSION INTRAVENOUS at 07:34

## 2022-03-10 RX ADMIN — OXYCODONE 10 MG: 5 TABLET ORAL at 15:59

## 2022-03-10 RX ADMIN — SODIUM CHLORIDE, POTASSIUM CHLORIDE, SODIUM LACTATE AND CALCIUM CHLORIDE: 600; 310; 30; 20 INJECTION, SOLUTION INTRAVENOUS at 06:16

## 2022-03-10 RX ADMIN — PHENYLEPHRINE HYDROCHLORIDE 20 MCG/MIN: 10 INJECTION INTRAVENOUS at 08:09

## 2022-03-10 RX ADMIN — GLYCOPYRROLATE 0.2 MG: 0.2 INJECTION, SOLUTION INTRAMUSCULAR; INTRAVENOUS at 08:09

## 2022-03-10 RX ADMIN — SODIUM CHLORIDE, POTASSIUM CHLORIDE, SODIUM LACTATE AND CALCIUM CHLORIDE: 600; 310; 30; 20 INJECTION, SOLUTION INTRAVENOUS at 09:11

## 2022-03-10 RX ADMIN — ROCURONIUM BROMIDE 50 MG: 10 SOLUTION INTRAVENOUS at 07:34

## 2022-03-10 RX ADMIN — OXYCODONE 10 MG: 5 TABLET ORAL at 21:25

## 2022-03-10 ASSESSMENT — PULMONARY FUNCTION TESTS
PIF_VALUE: 22
PIF_VALUE: 23
PIF_VALUE: 21
PIF_VALUE: 0
PIF_VALUE: 1
PIF_VALUE: 22
PIF_VALUE: 21
PIF_VALUE: 22
PIF_VALUE: 22
PIF_VALUE: 23
PIF_VALUE: 0
PIF_VALUE: 19
PIF_VALUE: 8
PIF_VALUE: 21
PIF_VALUE: 23
PIF_VALUE: 22
PIF_VALUE: 22
PIF_VALUE: 21
PIF_VALUE: 22
PIF_VALUE: 21
PIF_VALUE: 22
PIF_VALUE: 23
PIF_VALUE: 22
PIF_VALUE: 21
PIF_VALUE: 22
PIF_VALUE: 21
PIF_VALUE: 22
PIF_VALUE: 23
PIF_VALUE: 22
PIF_VALUE: 22
PIF_VALUE: 24
PIF_VALUE: 22
PIF_VALUE: 21
PIF_VALUE: 22
PIF_VALUE: 20
PIF_VALUE: 22
PIF_VALUE: 21
PIF_VALUE: 22
PIF_VALUE: 21
PIF_VALUE: 21
PIF_VALUE: 1
PIF_VALUE: 21
PIF_VALUE: 23
PIF_VALUE: 22
PIF_VALUE: 19
PIF_VALUE: 23
PIF_VALUE: 22
PIF_VALUE: 20
PIF_VALUE: 22
PIF_VALUE: 21
PIF_VALUE: 21
PIF_VALUE: 22
PIF_VALUE: 21
PIF_VALUE: 21
PIF_VALUE: 19
PIF_VALUE: 22
PIF_VALUE: 19
PIF_VALUE: 21
PIF_VALUE: 23
PIF_VALUE: 22
PIF_VALUE: 28
PIF_VALUE: 23
PIF_VALUE: 22
PIF_VALUE: 2
PIF_VALUE: 2
PIF_VALUE: 21
PIF_VALUE: 23
PIF_VALUE: 21
PIF_VALUE: 21
PIF_VALUE: 3
PIF_VALUE: 22
PIF_VALUE: 1
PIF_VALUE: 24
PIF_VALUE: 22
PIF_VALUE: 1
PIF_VALUE: 24
PIF_VALUE: 21
PIF_VALUE: 23
PIF_VALUE: 22
PIF_VALUE: 20
PIF_VALUE: 21
PIF_VALUE: 2
PIF_VALUE: 21
PIF_VALUE: 22
PIF_VALUE: 20
PIF_VALUE: 23
PIF_VALUE: 23
PIF_VALUE: 20
PIF_VALUE: 21
PIF_VALUE: 20
PIF_VALUE: 24
PIF_VALUE: 21
PIF_VALUE: 22
PIF_VALUE: 23
PIF_VALUE: 5
PIF_VALUE: 5
PIF_VALUE: 22
PIF_VALUE: 21
PIF_VALUE: 22
PIF_VALUE: 21
PIF_VALUE: 22
PIF_VALUE: 22
PIF_VALUE: 7
PIF_VALUE: 0
PIF_VALUE: 22
PIF_VALUE: 23
PIF_VALUE: 1
PIF_VALUE: 22
PIF_VALUE: 38
PIF_VALUE: 23
PIF_VALUE: 21
PIF_VALUE: 22
PIF_VALUE: 19
PIF_VALUE: 21
PIF_VALUE: 22
PIF_VALUE: 22
PIF_VALUE: 23
PIF_VALUE: 22
PIF_VALUE: 21
PIF_VALUE: 22
PIF_VALUE: 19
PIF_VALUE: 22
PIF_VALUE: 1
PIF_VALUE: 22
PIF_VALUE: 21
PIF_VALUE: 22
PIF_VALUE: 20
PIF_VALUE: 2
PIF_VALUE: 19
PIF_VALUE: 22
PIF_VALUE: 21
PIF_VALUE: 22
PIF_VALUE: 22
PIF_VALUE: 24
PIF_VALUE: 22
PIF_VALUE: 21
PIF_VALUE: 22
PIF_VALUE: 21
PIF_VALUE: 23
PIF_VALUE: 21
PIF_VALUE: 22
PIF_VALUE: 14
PIF_VALUE: 23
PIF_VALUE: 22
PIF_VALUE: 21
PIF_VALUE: 22
PIF_VALUE: 21
PIF_VALUE: 3
PIF_VALUE: 21
PIF_VALUE: 22
PIF_VALUE: 19
PIF_VALUE: 22
PIF_VALUE: 21
PIF_VALUE: 19
PIF_VALUE: 22
PIF_VALUE: 22
PIF_VALUE: 14
PIF_VALUE: 23
PIF_VALUE: 22
PIF_VALUE: 21
PIF_VALUE: 22
PIF_VALUE: 22
PIF_VALUE: 21
PIF_VALUE: 19
PIF_VALUE: 22
PIF_VALUE: 22
PIF_VALUE: 23
PIF_VALUE: 21

## 2022-03-10 ASSESSMENT — PAIN SCALES - GENERAL
PAINLEVEL_OUTOF10: 0
PAINLEVEL_OUTOF10: 7
PAINLEVEL_OUTOF10: 4
PAINLEVEL_OUTOF10: 7
PAINLEVEL_OUTOF10: 4

## 2022-03-10 ASSESSMENT — PAIN DESCRIPTION - ORIENTATION: ORIENTATION: RIGHT

## 2022-03-10 ASSESSMENT — LIFESTYLE VARIABLES: SMOKING_STATUS: 1

## 2022-03-10 ASSESSMENT — PAIN DESCRIPTION - FREQUENCY: FREQUENCY: INTERMITTENT

## 2022-03-10 ASSESSMENT — PAIN DESCRIPTION - DESCRIPTORS: DESCRIPTORS: ACHING;CONSTANT

## 2022-03-10 ASSESSMENT — PAIN DESCRIPTION - PROGRESSION: CLINICAL_PROGRESSION: GRADUALLY WORSENING

## 2022-03-10 ASSESSMENT — PAIN DESCRIPTION - LOCATION: LOCATION: LEG

## 2022-03-10 ASSESSMENT — PAIN DESCRIPTION - ONSET: ONSET: ON-GOING

## 2022-03-10 ASSESSMENT — PAIN DESCRIPTION - PAIN TYPE: TYPE: ACUTE PAIN;SURGICAL PAIN

## 2022-03-10 ASSESSMENT — PAIN - FUNCTIONAL ASSESSMENT: PAIN_FUNCTIONAL_ASSESSMENT: PREVENTS OR INTERFERES SOME ACTIVE ACTIVITIES AND ADLS

## 2022-03-10 NOTE — ANESTHESIA PRE PROCEDURE
Department of Anesthesiology  Preprocedure Note       Name:  Jigar Leon   Age:  78 y.o.  :  1955                                          MRN:  [id number]         Date:  3/10/2022      Surgeon: Jaleel Bhat):  Hemalatha Crury MD    Procedure: Procedure(s):  RIGHT BELOW THE KNEE POPLITEAL INSITU BYPASS GRAFT    Medications prior to admission:   Prior to Admission medications    Medication Sig Start Date End Date Taking? Authorizing Provider   metoprolol succinate (TOPROL XL) 25 MG extended release tablet Take 1 tablet by mouth daily 22  Yes Emerson Jorgensen MD   rosuvastatin (CRESTOR) 20 MG tablet Take 1 tablet by mouth daily 10/21/21  Yes Merari Tan MD   aspirin 81 MG chewable tablet Take 81 mg by mouth daily.    Yes Historical Provider, MD   lisinopril (PRINIVIL;ZESTRIL) 5 MG tablet Take 1 tablet by mouth daily 21   Merari Tan MD       Current medications:    Current Facility-Administered Medications   Medication Dose Route Frequency Provider Last Rate Last Admin    lactated ringers infusion   IntraVENous Continuous Morro Mccarty  mL/hr at 03/10/22 0649 NoRateChange at 03/10/22 0649    sodium chloride flush 0.9 % injection 10 mL  10 mL IntraVENous 2 times per day Morro Mccarty MD        sodium chloride flush 0.9 % injection 10 mL  10 mL IntraVENous PRN Morro Mccarty MD        0.9 % sodium chloride infusion  25 mL IntraVENous PRN Morro Mccarty MD        0.9 % sodium chloride infusion  25 mL IntraVENous PRN Hemalatha Curry MD        ceFAZolin (ANCEF) 2000 mg in dextrose 5 % 100 mL IVPB  2,000 mg IntraVENous On Call to Katy Walton MD        sodium chloride flush 0.9 % injection 5-40 mL  5-40 mL IntraVENous 2 times per day Hemalatha Curry MD        sodium chloride flush 0.9 % injection 5-40 mL  5-40 mL IntraVENous PRN Hemalatha Curry MD        sodium chloride flush 0.9 % injection 5-40 mL  5-40 mL IntraVENous 2 times per day Jaylin Thomas Payal Smith MD        sodium chloride flush 0.9 % injection 5-40 mL  5-40 mL IntraVENous PRN Ronald Harris MD        0.9 % sodium chloride infusion  25 mL IntraVENous PRN Ronald Harris MD        meperidine (DEMEROL) injection 12.5 mg  12.5 mg IntraVENous Q5 Min PRN Ronald Harris MD        HYDROmorphone (DILAUDID) injection 0.5 mg  0.5 mg IntraVENous Q10 Min PRN Ronald Harrsi MD        HYDROmorphone (DILAUDID) injection 0.5 mg  0.5 mg IntraVENous Q5 Min PRN Ronald Harris MD        oxyCODONE (ROXICODONE) immediate release tablet 5 mg  5 mg Oral Once PRN Ronald Harris MD        ondansetron [facility]) injection 4 mg  4 mg IntraVENous Q30 Min PRN Ronald Harris MD        midazolam (VERSED) injection 2 mg  2 mg IntraVENous Once PRN Ronald Harris MD        diphenhydrAMINE (BENADRYL) injection 6.25 mg  6.25 mg IntraVENous Once PRN Ronald Harris MD           Allergies:  No Known Allergies    Problem List:    Patient Active Problem List   Diagnosis Code    Severe claudication (HCC) I73.9    Abnormal EKG R94.31    Ischemic rest pain of lower extremity M79.606, I99.8       Past Medical History:        Diagnosis Date    CAD (coronary artery disease)     Hyperlipidemia     Hypertension     No longer taking meds    Thyroid disease     No longer taking meds    Wears dentures     upper       Past Surgical History:        Procedure Laterality Date    ARTERIAL BYPASS SURGRY      triple in 2009   [address] 44  2011    CABG x3 vessel    FEMORAL BYPASS Left 12/29/2021    LEFT FEMORAL POPLITEAL BYPASS performed by Trini Barker MD at [place] [address] History:    Social History     Tobacco Use    Smoking status: Current Every Day Smoker     Packs/day: 2.00     Types: Cigarettes    Smokeless tobacco: Never Used    Tobacco comment: 3/7/22 -  now smoking 3-7 cigs/day   Substance Use Topics    Alcohol use:  No                                Ready to quit: Not Answered  Counseling given: Yes  Comment: 3/7/22 -  now smoking 3-7 cigs/day      Vital Signs (Current):   Vitals:    03/07/22 1227 03/10/22 0545   BP:  (!) 172/84   Pulse:  59   Resp:  16   Temp:  97.1 °F (36.2 °C)   TempSrc:  Temporal   SpO2:  97%   Weight: 225 lb (102.1 kg) 230 lb 4 oz (104.4 kg)   Height: 5' 8\" (1.727 m) 5' 8\" (1.727 m)                                              BP Readings from Last 3 Encounters:   03/10/22 (!) 172/84   02/11/22 (!) 182/90   01/14/22 (!) 142/80       NPO Status: Time of last liquid consumption: 0430                        Time of last solid consumption: 1930                        Date of last liquid consumption: 03/10/22                        Date of last solid food consumption: 03/09/22    BMI:   Wt Readings from Last 3 Encounters:   03/10/22 230 lb 4 oz (104.4 kg)   02/11/22 228 lb (103.4 kg)   01/14/22 228 lb (103.4 kg)     Body mass index is 35.01 kg/m². CBC:   Lab Results   Component Value Date    WBC 5.8 03/07/2022    RBC 4.75 03/07/2022    HGB 14.8 03/07/2022    HCT 43.9 03/07/2022    MCV 92.5 03/07/2022    RDW 14.1 03/07/2022     03/07/2022       CMP:   Lab Results   Component Value Date     03/07/2022    K 4.5 03/07/2022    K 4.0 12/30/2021     03/07/2022    CO2 26 03/07/2022    BUN 11 03/07/2022    CREATININE 0.8 03/07/2022    GFRAA >60 03/07/2022    LABGLOM >60 03/07/2022    GLUCOSE 131 03/07/2022    PROT 7.2 09/17/2010    CALCIUM 9.6 03/07/2022    BILITOT 0.30 09/17/2010    ALKPHOS 56 09/17/2010    AST 23 09/17/2010    ALT 24 09/17/2010       POC Tests: No results for input(s): POCGLU, POCNA, POCK, POCCL, POCBUN, POCHEMO, POCHCT in the last 72 hours.     Coags: No results found for: PROTIME, INR, APTT    HCG (If Applicable): No results found for: PREGTESTUR, PREGSERUM, HCG, HCGQUANT     ABGs: No results found for: PHART, PO2ART, GBV1MJR, QAA6JKP, BEART, X4UCBQTV     Type & Screen (If Applicable):  No results found for: LABABO, [address]    Drug/Infectious Status (If Applicable):  No results found for: HIV, HEPCAB    COVID-19 Screening (If Applicable):   Lab Results   Component Value Date    COVID19 Not Detected 03/07/2022           Anesthesia Evaluation  Patient summary reviewed and Nursing notes reviewed no history of anesthetic complications:   Airway: Mallampati: III     Neck ROM: full   Dental:          Pulmonary:   (+) current smoker                           Cardiovascular:    (+) hypertension:, CAD:,                   Neuro/Psych:               GI/Hepatic/Renal:            ROS comment: obesity. Endo/Other:                     Abdominal:             Vascular:   + PVD, aortic or cerebral, . Other Findings:             Anesthesia Plan      general     ASA 3     (Medications & allergies reviewed  All available lab & EKG data reviewed)  Induction: intravenous. Anesthetic plan and risks discussed with patient.                       Luis Sherwood MD   3/10/2022

## 2022-03-10 NOTE — CARE COORDINATION
Spoke with patient at bedside. He lives in a house with his wife. He is independent at home and is an active . He denies any DME or services at home. He has a PCP and insurance. Likely no needs from CM. Please notify CM should any needs arise.

## 2022-03-10 NOTE — H&P
Outpatient Follow Up Note    Shanon Calvo is 78 y.o. male who presents today for  Right femoral popliteal bypass. 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 S/P L Fem-pop 12/2021. Past Medical History:   Diagnosis Date    CAD (coronary artery disease)     Hyperlipidemia     Hypertension     No longer taking meds    Thyroid disease     No longer taking meds    Wears dentures     upper       Past Surgical History:   Procedure Laterality Date    ARTERIAL BYPASS SURGRY      triple in 2009   [address]  2011    CABG x3 vessel    FEMORAL BYPASS Left 12/29/2021    LEFT FEMORAL POPLITEAL BYPASS performed by Dianne Crump MD at [place] [id number] History:    Social History     Tobacco Use   Smoking Status Current Every Day Smoker    Packs/day: 2.00    Types: Cigarettes   Smokeless Tobacco Never Used   Tobacco Comment    3/7/22 -  now smoking 3-7 cigs/day     Current Medications:  Current Facility-Administered Medications   Medication Dose Route Frequency Provider Last Rate Last Admin    lactated ringers infusion   IntraVENous Continuous Preet Ramos  mL/hr at 03/10/22 0649 NoRateChange at 03/10/22 0649    sodium chloride flush 0.9 % injection 10 mL  10 mL IntraVENous 2 times per day Preet Ramos MD        sodium chloride flush 0.9 % injection 10 mL  10 mL IntraVENous PRN Preet Ramos MD        0.9 % sodium chloride infusion  25 mL IntraVENous PRN Preet Ramos MD        0.9 % sodium chloride infusion  25 mL IntraVENous PRN Dianne Crump MD        ceFAZolin (ANCEF) 2000 mg in dextrose 5 % 100 mL IVPB  2,000 mg IntraVENous On Call to Katy Walton MD        sodium chloride flush 0.9 % injection 5-40 mL  5-40 mL IntraVENous 2 times per day Dianne Crump MD        sodium chloride flush 0.9 % injection 5-40 mL  5-40 mL IntraVENous PRN Dianne Crump MD         Allergies:  Patient has no known allergies.     REVIEW OF SYSTEMS:   A 14 point review of systems was completed. Pertinent positives identified in the HPI, all other review of systems negative. Objective:   PHYSICAL EXAM:        VITALS:  BP (!) 172/84   Pulse 59   Temp 97.1 °F (36.2 °C) (Temporal)   Resp 16   Ht 5' 8\" (1.727 m)   Wt 230 lb 4 oz (104.4 kg)   SpO2 97%   BMI 35.01 kg/m²   CONSTITUTIONAL: Cooperative, no apparent distress, and appears well nourished / developed  NEUROLOGIC:  Awake and oriented to person, place and time. PSYCH: Calm affect. SKIN: Warm and dry. HEENT: Sclera non-icteric, normocephalic, neck supple, normal carotid pulses with no bruits and thyroid normal size. LUNGS:  No increased work of breathing and clear to auscultation, no crackles or wheezing  CARDIOVASCULAR:  Regular rate and rhythm with no murmurs, gallops, rubs, or abnormal heart sounds, normal PMI. Pulses:    femoral DP PT   RIGHT 2 - -   LEFT 2  2     ABDOMEN:  Normal bowel sounds, non-distended and non-tender to palpation  EXT: No edema, no calf tenderness. Assessment:   Right popliteal artery aneurysm and SFA occlusion    Plan:   Right femoral popliteal bypass. Discussed all risks ( including but not limited to death, MI, infection, hemorrhage wound healing problems, graft failure and nerve injury), benefits and alternatives to distal bypass with patient and His family. They understand, freely consent and are eager to proceed. All questions and expectations addressed.         Chas Maldonado MD, FACS

## 2022-03-10 NOTE — ANESTHESIA POSTPROCEDURE EVALUATION
Department of Anesthesiology  Postprocedure Note    Patient: Alan Parsons  MRN: [id number]  YOB: 1955  Date of evaluation: 3/10/2022  Time:  6:45 PM     Procedure Summary     Date: 03/10/22 Room / Location: [place] [address] 01 / [facility]    Anesthesia Start: 8518 Anesthesia Stop: 7418    Procedure: RIGHT BELOW THE KNEE POPLITEAL INSITU BYPASS GRAFT (Right Leg Lower) Diagnosis:       Peripheral vascular disease, unspecified ([address]      (PERIPHERAL VASCULAR DISEASE)    Surgeons: Keily Lincoln MD Responsible Provider: Benjamin Tesfaye MD    Anesthesia Type: general ASA Status: 3          Anesthesia Type: general    Ernesto Phase I: Ernesto Score: 9    Ernesto Phase II:      Last vitals: Reviewed and per EMR flowsheets.        Anesthesia Post Evaluation    Comments: Postoperative Anesthesia Note    Name:    Alan Parsons  MRN:      [id number]    Patient Vitals in the past 12 hrs:  03/10/22 1551, BP:(!) 173/83, Temp:97.8 °F (36.6 °C), Temp src:Oral, Pulse:69, Resp:18, SpO2:93 %  03/10/22 1215, BP:(!) 145/74, Pulse:69, SpO2:(!) 89 %  03/10/22 1204, Pulse:64  03/10/22 1150, BP:(!) 146/67, Pulse:62, SpO2:92 %  03/10/22 1145, BP:(!) 154/66, Pulse:67, SpO2:93 %  03/10/22 1128, BP:(!) 161/80  03/10/22 1120, BP:(!) 154/80, Pulse:71, SpO2:92 %  03/10/22 1115, BP:(!) 143/78, Pulse:69, SpO2:91 %  03/10/22 1110, BP:128/65, Pulse:70, SpO2:91 %  03/10/22 1105, BP:136/66, Pulse:67, SpO2:95 %  03/10/22 1100, BP:109/72, Temp:96.3 °F (35.7 °C), Temp src:Tympanic, Pulse:70, Resp:14, SpO2:96 %     LABS:    CBC  Lab Results       Component                Value               Date/Time                  WBC                      5.8                 03/07/2022 11:50 AM        HGB                      14.8                03/07/2022 11:50 AM        HCT                      43.9                03/07/2022 11:50 AM        PLT                      291                 03/07/2022 11:50 AM   RENAL  Lab Results       Component Value               Date/Time                  NA                       139                 03/07/2022 11:50 AM        K                        4.5                 03/07/2022 11:50 AM        K                        4.0                 12/30/2021 04:46 AM        CL                       100                 03/07/2022 11:50 AM        CO2                      26                  03/07/2022 11:50 AM        BUN                      11                  03/07/2022 11:50 AM        CREATININE               0.8                 03/07/2022 11:50 AM        GLUCOSE                  131 (H)             03/07/2022 11:50 AM   COAGS  No results found for: PROTIME, INR, APTT    Intake & Output:  @36YJIE@    Nausea & Vomiting:  No    Level of Consciousness:  Awake    Pain Assessment:  Adequate analgesia    Anesthesia Complications:  No apparent anesthetic complications    SUMMARY      Vital signs stable  OK to discharge from Stage I post anesthesia care.   Care transferred from Anesthesiology department on discharge from perioperative area

## 2022-03-10 NOTE — BRIEF OP NOTE
Brief Postoperative Note      Patient: Aruna Mccord  YOB: 1955  MRN: [id number]    Date of Procedure: 3/10/2022    Pre-Op Diagnosis: PERIPHERAL VASCULAR DISEASE    Post-Op Diagnosis: Same       Procedure(s):  RIGHT BELOW THE KNEE POPLITEAL INSITU BYPASS GRAFT    Surgeon(s):  Harpreet Jack MD    Assistant:  Surgical Assistant: Florence Chavez    Anesthesia: General    Estimated Blood Loss (mL): less than 50     Complications: None    Specimens:   * No specimens in log *    Implants:  * No implants in log *      Drains:   Urethral Catheter Non-latex 16 fr (Active)           Electronically signed by Harpreet Jack MD on 3/10/2022 at 10:28 AM

## 2022-03-11 VITALS
WEIGHT: 230.25 LBS | TEMPERATURE: 98.2 F | SYSTOLIC BLOOD PRESSURE: 141 MMHG | HEIGHT: 68 IN | OXYGEN SATURATION: 91 % | RESPIRATION RATE: 18 BRPM | BODY MASS INDEX: 34.9 KG/M2 | HEART RATE: 54 BPM | DIASTOLIC BLOOD PRESSURE: 72 MMHG

## 2022-03-11 LAB
ANION GAP SERPL CALCULATED.3IONS-SCNC: 9 MMOL/L (ref 3–16)
BUN BLDV-MCNC: 15 MG/DL (ref 7–20)
CALCIUM SERPL-MCNC: 9.3 MG/DL (ref 8.3–10.6)
CHLORIDE BLD-SCNC: 98 MMOL/L (ref 99–110)
CO2: 28 MMOL/L (ref 21–32)
CREAT SERPL-MCNC: 0.8 MG/DL (ref 0.8–1.3)
GFR AFRICAN AMERICAN: >60
GFR NON-AFRICAN AMERICAN: >60
GLUCOSE BLD-MCNC: 191 MG/DL (ref 70–99)
HCT VFR BLD CALC: 40.7 % (ref 40.5–52.5)
HEMOGLOBIN: 13.5 G/DL (ref 13.5–17.5)
MCH RBC QN AUTO: 31.2 PG (ref 26–34)
MCHC RBC AUTO-ENTMCNC: 33.2 G/DL (ref 31–36)
MCV RBC AUTO: 93.9 FL (ref 80–100)
PDW BLD-RTO: 14.2 % (ref 12.4–15.4)
PLATELET # BLD: 262 K/UL (ref 135–450)
PMV BLD AUTO: 6.8 FL (ref 5–10.5)
POTASSIUM REFLEX MAGNESIUM: 4.4 MMOL/L (ref 3.5–5.1)
RBC # BLD: 4.34 M/UL (ref 4.2–5.9)
SODIUM BLD-SCNC: 135 MMOL/L (ref 136–145)
WBC # BLD: 8.9 K/UL (ref 4–11)

## 2022-03-11 PROCEDURE — 80048 BASIC METABOLIC PNL TOTAL CA: CPT

## 2022-03-11 PROCEDURE — 85027 COMPLETE CBC AUTOMATED: CPT

## 2022-03-11 PROCEDURE — 36415 COLL VENOUS BLD VENIPUNCTURE: CPT

## 2022-03-11 PROCEDURE — 2580000003 HC RX 258: Performed by: SURGERY

## 2022-03-11 PROCEDURE — 6370000000 HC RX 637 (ALT 250 FOR IP): Performed by: SURGERY

## 2022-03-11 RX ORDER — OXYCODONE HYDROCHLORIDE 5 MG/1
5 TABLET ORAL EVERY 6 HOURS PRN
Qty: 28 TABLET | Refills: 0 | Status: SHIPPED | OUTPATIENT
Start: 2022-03-11 | End: 2022-03-18

## 2022-03-11 RX ADMIN — ASPIRIN 81 MG 81 MG: 81 TABLET ORAL at 08:39

## 2022-03-11 RX ADMIN — METOPROLOL SUCCINATE 25 MG: 25 TABLET, EXTENDED RELEASE ORAL at 08:38

## 2022-03-11 RX ADMIN — SODIUM CHLORIDE, PRESERVATIVE FREE 10 ML: 5 INJECTION INTRAVENOUS at 08:39

## 2022-03-11 RX ADMIN — LISINOPRIL 5 MG: 5 TABLET ORAL at 08:39

## 2022-03-11 RX ADMIN — ROSUVASTATIN CALCIUM 20 MG: 10 TABLET, FILM COATED ORAL at 08:38

## 2022-03-11 ASSESSMENT — PAIN SCALES - GENERAL: PAINLEVEL_OUTOF10: 0

## 2022-03-11 NOTE — OP NOTE
[facility] [place] [address]                                OPERATIVE REPORT    PATIENT NAME: Jodi Chew                   :        1955  MED REC NO:   [id number]                          ROOM:       2508  ACCOUNT NO:   [de-identified]                           ADMIT DATE: 03/10/2022  PROVIDER:     Zeke Prescott MD    DATE OF PROCEDURE:  03/10/2022    PREOPERATIVE DIAGNOSES:  1. Peripheral vascular disease with right lower extremity claudication. 2.  Superficial femoral artery occlusion. 3.  Right popliteal artery aneurysm. PROCEDURES PERFORMED:  Right femoral to below-knee popliteal in situ  bypass with ligation of proximal below-knee popliteal artery. SURGEON:  Zeke Prescott MD    ANESTHESIA:  General endotracheal.    INDICATIONS:  The patient is a 76-year-old male with bilateral popliteal  artery aneurysmal disease. He also has superficial femoral artery  occlusion on the right above the popliteal artery aneurysm. The patient  is brought to the operating room at this time to undergo right femoral  to popliteal bypass with ligation of the distal popliteal artery to  exclude the aneurysm from the circulation and avoid embolic and  thrombotic issues. PROCEDURE:  The patient was brought to the operating room, placed in  supine position. General endotracheal anesthesia induced. After  adequate anesthesia, the right lower extremity was prepped and draped in  sterile fashion. An incision was made in the medial calf just below the  knee. The saphenous vein here was identified, mobilized, side branches  ligated and divided using 3-0 silk ties and hemoclips. The incision was  deepened through the fascia. The gastroc muscle was retracted  posteriorly, exposing the below-knee popliteal space. The popliteal  artery was identified here.   It was soft and normal.  It was mobilized  for several centimeters and encircled with vessel loops. Two small  incisions were made in the mid thigh, ligating side branches of the  saphenous vein, which had previously been marked on the skin. An  oblique incision was then made in the groin and the proximal saphenous  vein and saphenofemoral junction were identified and mobilized  circumferentially ligating and dividing side branches. The common  femoral, proximal deep femoral and superficial femoral arteries were  then exposed and dissected and encircled with vessel loops. The  saphenofemoral junction was clamped and divided. The proximal stump  oversewn using 5-0 Prolene suture. The first venous valve of the  saphenous vein was removed under direct visualization. The patient was  given 5000 units of intravenous heparin. After approximately 3 minutes,  the common femoral and deep femoral arteries were clamped. A small  incision was made at the common femoral bifurcation extending slightly  into the proximal superficial femoral artery. There was some thrombotic  material that was removed. The saphenous vein was then sewn to the  arteriotomy using running 6-0 Prolene sutures. After completing the  anastomosis, the clamps were removed. There was excellent flow into the  proximal saphenous vein. The saphenous vein was then ligated at the mid  calf where it had been _____ distal extent of its exposure using a 2-0  silk tie and divided. A LeMaitre self-expanding valvulotome was passed  retrograde up the vein graft to the proximal anastomosis where it was  deployed and pulled through the vein dividing all valves, establishing  excellent arterial outflow through the end of the vein. The vein graft  was then clamped distally with hemoclips. The below-knee popliteal  artery was then occluded constricting vessel loops that had been placed  around it proximally and distally. A longitudinal arteriotomy was made,  widened using a 4 mm aortic punch.   The micro bulldog clamp was placed  on the vein graft and the vein graft was cut to the appropriate length  and spatulated posteriorly. The distal anastomosis between the vein in  the popliteal artery was then created using running 6-0 Prolene suture. After completing the anastomosis, the micro bulldog clamp was removed  and the vessel loops were released. There was an excellent pulse in the  vein graft and an excellent pulse in the popliteal vessel. There was a  palpable dorsalis pedis and posterior tibial pulse at the ankle. The  popliteal artery just proximal to the anastomosis was then ligated using  an 0 silk tie. The common femoral artery was then punctured and a  5-Taiwanese introducer sheath was placed. Right lower extremity angiogram  was then performed showing widely patent vein graft with no retained  side branches with excellent outflow into the below-knee popliteal  artery filling both the anterior and posterior tibial arteries. The  sheath was removed from the common femoral artery and a 6-0 Prolene  suture was used to close the arteriotomy. The incisions were then  irrigated with antibiotic saline solution. Deep tissues at the groin  and the below-knee incision were reapproximated using running 2-0 Vicryl  sutures. All subcutaneous tissues at all incisions were approximated  using running 3-0 Vicryl sutures and the skin edges reapproximated using  running 4-0 Monocryl subcuticular sutures. Dermabond and Prineo were  applied to the distal incisions and the groin incision was dressed with  Steri-Strips and then a SUSSY negative pressure wound dressing was  applied. There were no complications to this procedure. Estimated  blood loss less than 100 mL. At the conclusion of the procedure,  sponge, needle and instrument counts were correct. The patient was  extubated in the operating room and transferred to the recovery area in  stable condition.         Lexa Kumar MD    D: 03/10/2022 15:52:11       T: 03/10/2022 15:54:55 [id number]  Job#: [id number]     Doc#: [id number]    CC:

## 2022-03-11 NOTE — PROGRESS NOTES
Vascular Surgery Progress Note      SUBJECTIVE:  No c/o this am    OBJECTIVE    Physical  CURRENT VITALS:  BP (!) 159/70   Pulse 58   Temp 98.3 °F (36.8 °C) (Oral)   Resp 16   Ht 5' 8\" (1.727 m)   Wt 230 lb 4 oz (104.4 kg)   SpO2 92%   BMI 35.01 kg/m²   24 HR INTAKE/OUTPUT:    Intake/Output Summary (Last 24 hours) at 3/11/2022 0658  Last data filed at 3/10/2022 2229  Gross per 24 hour   Intake 1940 ml   Output 2250 ml   Net -310 ml     RLE incisions intact  SUSSY over groin incision without leak  Palpable DP pulse    Data  CBC:   Lab Results   Component Value Date    WBC 8.9 03/11/2022    RBC 4.34 03/11/2022    HGB 13.5 03/11/2022    HCT 40.7 03/11/2022    MCV 93.9 03/11/2022    MCH 31.2 03/11/2022    MCHC 33.2 03/11/2022    RDW 14.2 03/11/2022     03/11/2022    MPV 6.8 03/11/2022         ASSESSMENT AND PLAN    POD #1 R Fem-Pop   Graft patent with excellent perfusion   Ambulate   [place] out   Home later today

## 2022-03-14 ENCOUNTER — TELEPHONE (OUTPATIENT)
Dept: VASCULAR SURGERY | Age: 67
End: 2022-03-14

## 2022-03-14 ENCOUNTER — HOSPITAL ENCOUNTER (EMERGENCY)
Age: 67
Discharge: HOME | End: 2022-03-14
Attending: EMERGENCY MEDICINE
Payer: MEDICARE

## 2022-03-14 ENCOUNTER — ANESTHESIA EVENT (OUTPATIENT)
Dept: OPERATING ROOM | Age: 67
End: 2022-03-14
Payer: MEDICARE

## 2022-03-14 ENCOUNTER — PREP FOR PROCEDURE (OUTPATIENT)
Dept: VASCULAR SURGERY | Age: 67
End: 2022-03-14

## 2022-03-14 VITALS
TEMPERATURE: 98.8 F | SYSTOLIC BLOOD PRESSURE: 185 MMHG | RESPIRATION RATE: 16 BRPM | DIASTOLIC BLOOD PRESSURE: 84 MMHG | OXYGEN SATURATION: 97 % | HEIGHT: 68 IN | HEART RATE: 69 BPM | BODY MASS INDEX: 34.1 KG/M2 | WEIGHT: 225 LBS

## 2022-03-14 DIAGNOSIS — T81.31XA DEHISCENCE OF OPERATIVE WOUND, INITIAL ENCOUNTER: Primary | ICD-10-CM

## 2022-03-14 PROCEDURE — 99284 EMERGENCY DEPT VISIT MOD MDM: CPT

## 2022-03-14 ASSESSMENT — PAIN - FUNCTIONAL ASSESSMENT: PAIN_FUNCTIONAL_ASSESSMENT: 0-10

## 2022-03-14 ASSESSMENT — ENCOUNTER SYMPTOMS
ABDOMINAL PAIN: 0
BACK PAIN: 0
SHORTNESS OF BREATH: 0

## 2022-03-14 ASSESSMENT — PAIN SCALES - GENERAL: PAINLEVEL_OUTOF10: 0

## 2022-03-14 NOTE — PROGRESS NOTES
Alan Deacon    Age 78 y.o.    male    1955    MRN [id number]    3/15/2022  Arrival Time_____________  OR Time____________[address]     Procedure(s):  WOUND IRRIGATION AND CLOSURE RIGHT LEG SURGICAL WOUND                      General     Surgeon(s):  Keily Dus, MD      DAY ADMIT ___  SDS/OP ___  OUTPT IN BED ___         Phone [phone] (home)    PCP _____________________ Phone_________________ Epic ( ) Epic CE ( ) Appt ________    ADDITIONAL INFO __________________________________ Cardio/Consult _____________    NOTES _____________________________________________________________________    ____________________________________________________________________________    PAT APPT DATE:________ TIME: ________  FAXED QAD: _______  (__) H&P w/ hospitalist  ____________________________________________________________________________    COVID TEST: Date/Location______________        NURSING HISTORY COMPLETE: _______  (__) CBC       (__) W/ DIFF ___________  (__)  ECHO    __________  (__) Hgb A1C    ___________  (__) CHEST X RAY   __________  (__) LIPID PROFILE  ___________  (__) EKG   __________  (__) PT/PTT   ___________  (__) PFT's   __________  (__) BMP   ___________  (__) CAROTIDS  __________  (__) CMP   ___________  (__) VEIN MAPPING  __________  (__) U/A   ___________  (__) HISTORY & PHYSICAL __________  (__) URINE C & S  ___________  (__) CARDIAC CLEARANCE __________  (__) U/A W/ FLEX  ___________  (__) PULM.  CLEARANCE __________  (__) SERUM PREGNANCY ___________  (__) Check Epic DOS orders __________  (__) TYPE & SCREEN ________ repeat ( ) (__)  __________________ __________  (__) ALBUMIN   ___________  (__)  __________________ __________  (__) TRANSFERRIN  ___________  (__)  __________________ __________  (__) LIVER PROFILE  ___________  (__)  __________________ __________  (__) CARBOXY HGB  ___________  (__) URINE PREG DOS __________  (__) NICOTINE & MET.  ___________  (__) BLOOD SUGAR DOS __________  (__) PREALBUMIN  ___________    (__) MRSA NASAL SWAB ___________  (__) BLOOD THINNERS __________  (__) ACE/ ARBS: _____________________    (__) BETABLOCKERS ___________________

## 2022-03-14 NOTE — TELEPHONE ENCOUNTER
Received Call From Ed at [facility], Dr Juvenal Ballesteros. Patients there with open right leg wound. Patient to have incision lightly packed today and cover with Dry 4X4, Kerlex, and Ace Wrap. He is being put on OR schedule at [address] [place] [address], 3/15/2022 and arrive at 11:30am for 1:30pm surgery and npo after midnight.  angelica

## 2022-03-14 NOTE — PROGRESS NOTES
Preoperative Screening for Elective Surgery/Invasive Procedures While COVID-19 present in the community     Have you had any of the following symptoms? o Fever, chills  o Cough  o Shortness of breath  o Muscle aches/pain  o Diarrhea  o Abdominal pain, nausea, vomiting  o Loss or decrease in taste and / or smell   Risk of Exposure  o Have you recently been hospitalized for COVID-19 or flu-like illness, if so when?  o Recently diagnosed with COVID-19, if so when?  o Recently tested for COVID-19, if so when?  o Have you been in close contact with a person or family member who currently has or recently had COVID-19? If yes, when and in what context?  o Do you live with anybody who in the last 14 days has had fever, chills, shortness of breath, muscle aches, flu-like illness?  o Do you have any close contacts or family members who are currently in the hospital for COVID-19 or flu-like illness? If yes, assess recent close contact with this person. Indicate if the patient has a positive screen by answering yes to one or more of the above questions. Patients who test positive or screen positive prior to surgery or on the day of surgery should be evaluated in conjunction with the surgeon/proceduralist/anesthesiologist to determine the urgency of the procedure.      No to all questions

## 2022-03-14 NOTE — ED PROVIDER NOTES
[facility]      Pt Name: Jan Maher  MRN: [id number]  [place] 1955  Date of evaluation: 3/14/2022  Provider: Sky Arevalo MD    CHIEF COMPLAINT       Chief Complaint   Patient presents with    Post-op Problem     Pt had bypass done by Dr. Renu Mackay on 3/10/22 at [place] [facility]. Pt states today he noticed that two incisions on his R leg have reopened. Large opening noted to the inside of RLE. HISTORY OF PRESENT ILLNESS   (Location/Symptom, Timing/Onset, Context/Setting, Quality, Duration, Modifying Factors, Severity)  Note limiting factors. Jan Maher is a 78 y.o. male who presents to the emergency department     Patient on Thursday had a vascular surgical procedure by Dr. Rickie Fernández at [facility] like he had a femoral-popliteal bypass performed so is a long scar medially apparently this morning it dehisced open immediately drove here he was scared patient has about a 6 to 7 inch area of dehiscence it is rather deep. There is no arterial bleeding noted. I could not see the graft. But it is somewhat into the subcutaneous tissue his whole leg seems a little bit edematous. He denies other symptoms he denies fever chills chest pain shortness of breath    The history is provided by the patient. Nursing Notes were reviewed. REVIEW OF SYSTEMS    (2-9 systems for level 4, 10 or more for level 5)     Review of Systems   Constitutional: Positive for activity change. Negative for chills and fever. Respiratory: Negative for shortness of breath. Cardiovascular: Negative for chest pain. Gastrointestinal: Negative for abdominal pain. Musculoskeletal: Negative for back pain and neck pain. Neurological: Negative for dizziness. Psychiatric/Behavioral: Negative for behavioral problems. All other systems reviewed and are negative. Except as noted above the remainder of the review of systems was reviewed and negative. PAST MEDICAL HISTORY     Past Medical History:   Diagnosis Date    CAD (coronary artery disease)     Hyperlipidemia     Hypertension     No longer taking meds    Thyroid disease     No longer taking meds    Wears dentures     upper         SURGICAL HISTORY       Past Surgical History:   Procedure Laterality Date    ARTERIAL BYPASS SURGRY      triple in 2009   [address]  2011    CABG x3 vessel    FEMORAL BYPASS Left 12/29/2021    LEFT FEMORAL POPLITEAL BYPASS performed by Patricia Acevedo MD at [facility] Right 3/10/2022    RIGHT BELOW THE KNEE POPLITEAL INSITU BYPASS GRAFT performed by Patricia Acevedo MD at [address] Right 3/15/2022    WOUND IRRIGATION AND CLOSURE RIGHT LEG SURGICAL WOUND performed by Patricia Acevedo MD at [address]       Discharge Medication List as of 3/14/2022  1:21 PM      CONTINUE these medications which have NOT CHANGED    Details   oxyCODONE (ROXICODONE) 5 MG immediate release tablet Take 1 tablet by mouth every 6 hours as needed for Pain for up to 7 days. Intended supply: 7 days. Take lowest dose possible to manage pain, Disp-28 tablet, R-0Normal      metoprolol succinate (TOPROL XL) 25 MG extended release tablet Take 1 tablet by mouth daily, Disp-90 tablet, R-0Normal      lisinopril (PRINIVIL;ZESTRIL) 5 MG tablet Take 1 tablet by mouth daily, Disp-90 tablet, R-1Normal      rosuvastatin (CRESTOR) 20 MG tablet Take 1 tablet by mouth daily, Disp-30 tablet, R-5Normal      aspirin 81 MG chewable tablet Take 81 mg by mouth daily. ALLERGIES     Patient has no known allergies.     FAMILY HISTORY       Family History   Problem Relation Age of Onset    Heart Disease Mother     Stroke Father           SOCIAL HISTORY       Social History     Socioeconomic History    Marital status:      Spouse name: None    Number of children: None    Years of education: None    Highest education level: None Occupational History    None   Tobacco Use    Smoking status: Current Every Day Smoker     Packs/day: 2.00     Types: Cigarettes    Smokeless tobacco: Never Used    Tobacco comment: 3/7/22 -  now smoking 3-7 cigs/day   Vaping Use    Vaping Use: Never used   Substance and Sexual Activity    Alcohol use: No    Drug use: Not Currently     Types: Marijuana [facility])    Sexual activity: None   Other Topics Concern    None   Social History Narrative    None     Social Determinants of Health     Financial Resource Strain:     Difficulty of Paying Living Expenses: Not on file   Food Insecurity:     Worried About Running Out of Food in the Last Year: Not on file    Suad of Food in the Last Year: Not on file   Transportation Needs:     Lack of Transportation (Medical): Not on file    Lack of Transportation (Non-Medical):  Not on file   Physical Activity:     Days of Exercise per Week: Not on file    Minutes of Exercise per Session: Not on file   Stress:     Feeling of Stress : Not on file   Social Connections:     Frequency of Communication with Friends and Family: Not on file    Frequency of Social Gatherings with Friends and Family: Not on file    Attends Muslim Services: Not on file    Active Member of [address] [place] [address] [employer] or Organizations: Not on file    Attends Club or Organization Meetings: Not on file    Marital Status: Not on file   Intimate Partner Violence:     Fear of Current or Ex-Partner: Not on file    Emotionally Abused: Not on file    Physically Abused: Not on file    Sexually Abused: Not on file   Housing Stability:     Unable to Pay for Housing in the Last Year: Not on file    Number of [place] in the Last Year: Not on file    Unstable Housing in the Last Year: Not on file       SCREENINGS    Claudia Coma Scale  Eye Opening: Spontaneous  Best Verbal Response: Oriented  Best Motor Response: Obeys commands  Claudia Coma Scale Score: 15          PHYSICAL EXAM    (up to 7 for level 4, 8 or more for level 5)     ED Triage Vitals [03/14/22 1134]   BP Temp Temp Source Pulse Resp SpO2 Height Weight   (!) 191/83 98.8 °F (37.1 °C) Oral 72 22 97 % 5' 8\" (1.727 m) 225 lb (102.1 kg)       Physical Exam  Vitals reviewed. HENT:      Head: Normocephalic and atraumatic. Right Ear: Tympanic membrane, ear canal and external ear normal.      Left Ear: Tympanic membrane, ear canal and external ear normal.      Nose: Nose normal.      Mouth/Throat:      Mouth: Mucous membranes are moist.   Cardiovascular:      Rate and Rhythm: Normal rate and regular rhythm. Pulses: Normal pulses. Heart sounds: Normal heart sounds. Pulmonary:      Effort: Pulmonary effort is normal.   Musculoskeletal:      Cervical back: Normal range of motion and neck supple. Skin:     General: Skin is warm. Capillary Refill: Capillary refill takes less than 2 seconds. Findings: Lesion present. Comments: She has approximately 6 cm wound dehiscence of the right lower calf where his bypass surgery has dehisced he did not bump it he said it just popped open   Neurological:      General: No focal deficit present. Mental Status: He is alert and oriented to person, place, and time. DIAGNOSTIC RESULTS     EKG: All EKG's are interpreted by the Emergency Department Physician who either signs or Co-signs this chart in the absence of a cardiologist.        RADIOLOGY:   Non-plain film images such as CT, Ultrasound and MRI are read by the radiologist. Plain radiographic images are visualized and preliminarily interpreted by the emergency physician with the below findings:        Interpretation per the Radiologist below, if available at the time of this note:    No orders to display           LABS:  No results found for this visit on 03/14/22.          EMERGENCY DEPARTMENT COURSE and DIFFERENTIAL DIAGNOSIS/MDM:     Vitals:    03/14/22 1134 03/14/22 1321   BP: (!) 191/83 (!) 185/84   Pulse: 72 69   Resp: 22 16 Temp: 98.8 °F (37.1 °C)    TempSrc: Oral    SpO2: 97% 97%   Weight: 225 lb (102.1 kg)    Height: 5' 8\" (1.727 m)            MDM      REASSESSMENT      I did talk to [facility] Dr. Catie Henderson nurse practitioner several time I have not took a picture and send it to her she showed it to him and when he wants to take it to the operating room tomorrow morning patient is advised to call Ilsa as soon as he gets home to schedule the surgery  At this point it was wrapped with special gauze and then an Ace bandage    CRITICAL CARE TIME     CONSULTS:  None      PROCEDURES:     Procedures    MEDICATIONS GIVEN THIS VISIT:  Medications - No data to display     FINAL IMPRESSION      1. Dehiscence of operative wound, initial encounter            DISPOSITION/PLAN   DISPOSITION Decision To Discharge 03/14/2022 01:05:08 PM      PATIENT REFERRED TO:  Yo Mcghee MD  [address] [place], [place] [phone]    In 1 day        DISCHARGE MEDICATIONS:  Discharge Medication List as of 3/14/2022  1:21 PM          Controlled Substances Monitoring  No flowsheet data found. (Please note that portions of this note were completed with a voice recognition program.  Efforts were made to edit the dictations but occasionally words are mis-transcribed.)    Patient was advised to return to the Emergency Department if there was any worsening.     Sallie Street MD (electronically signed)  Attending Emergency Physician         Cierra Newsome MD  03/14/22 028 Jose Johnston MD  03/24/22 2199

## 2022-03-15 ENCOUNTER — ANESTHESIA (OUTPATIENT)
Dept: OPERATING ROOM | Age: 67
End: 2022-03-15
Payer: MEDICARE

## 2022-03-15 ENCOUNTER — HOSPITAL ENCOUNTER (OUTPATIENT)
Age: 67
Setting detail: OUTPATIENT SURGERY
Discharge: HOME | End: 2022-03-15
Attending: SURGERY | Admitting: SURGERY
Payer: MEDICARE

## 2022-03-15 VITALS
DIASTOLIC BLOOD PRESSURE: 50 MMHG | RESPIRATION RATE: 16 BRPM | SYSTOLIC BLOOD PRESSURE: 92 MMHG | TEMPERATURE: 98.6 F | OXYGEN SATURATION: 99 %

## 2022-03-15 VITALS
WEIGHT: 225 LBS | DIASTOLIC BLOOD PRESSURE: 87 MMHG | SYSTOLIC BLOOD PRESSURE: 167 MMHG | BODY MASS INDEX: 34.1 KG/M2 | HEIGHT: 68 IN | RESPIRATION RATE: 16 BRPM | TEMPERATURE: 96.7 F | OXYGEN SATURATION: 97 % | HEART RATE: 68 BPM

## 2022-03-15 LAB
BASOPHILS ABSOLUTE: 0.1 K/UL (ref 0–0.2)
BASOPHILS RELATIVE PERCENT: 1.1 %
EOSINOPHILS ABSOLUTE: 0.1 K/UL (ref 0–0.6)
EOSINOPHILS RELATIVE PERCENT: 1.9 %
HCT VFR BLD CALC: 43.2 % (ref 40.5–52.5)
HEMOGLOBIN: 14.5 G/DL (ref 13.5–17.5)
LYMPHOCYTES ABSOLUTE: 1.8 K/UL (ref 1–5.1)
LYMPHOCYTES RELATIVE PERCENT: 26.5 %
MCH RBC QN AUTO: 31.1 PG (ref 26–34)
MCHC RBC AUTO-ENTMCNC: 33.6 G/DL (ref 31–36)
MCV RBC AUTO: 92.6 FL (ref 80–100)
MONOCYTES ABSOLUTE: 0.7 K/UL (ref 0–1.3)
MONOCYTES RELATIVE PERCENT: 9.6 %
NEUTROPHILS ABSOLUTE: 4.2 K/UL (ref 1.7–7.7)
NEUTROPHILS RELATIVE PERCENT: 60.9 %
PDW BLD-RTO: 14.2 % (ref 12.4–15.4)
PLATELET # BLD: 253 K/UL (ref 135–450)
PMV BLD AUTO: 6.6 FL (ref 5–10.5)
RBC # BLD: 4.67 M/UL (ref 4.2–5.9)
SARS-COV-2, NAAT: NOT DETECTED
WBC # BLD: 6.9 K/UL (ref 4–11)

## 2022-03-15 PROCEDURE — 2580000003 HC RX 258: Performed by: SURGERY

## 2022-03-15 PROCEDURE — 2500000003 HC RX 250 WO HCPCS: Performed by: NURSE ANESTHETIST, CERTIFIED REGISTERED

## 2022-03-15 PROCEDURE — 2709999900 HC NON-CHARGEABLE SUPPLY: Performed by: SURGERY

## 2022-03-15 PROCEDURE — 3600000013 HC SURGERY LEVEL 3 ADDTL 15MIN: Performed by: SURGERY

## 2022-03-15 PROCEDURE — 3700000000 HC ANESTHESIA ATTENDED CARE: Performed by: SURGERY

## 2022-03-15 PROCEDURE — A4217 STERILE WATER/SALINE, 500 ML: HCPCS | Performed by: SURGERY

## 2022-03-15 PROCEDURE — 7100000000 HC PACU RECOVERY - FIRST 15 MIN: Performed by: SURGERY

## 2022-03-15 PROCEDURE — 85025 COMPLETE CBC W/AUTO DIFF WBC: CPT

## 2022-03-15 PROCEDURE — 6360000002 HC RX W HCPCS: Performed by: NURSE ANESTHETIST, CERTIFIED REGISTERED

## 2022-03-15 PROCEDURE — 7100000010 HC PHASE II RECOVERY - FIRST 15 MIN: Performed by: SURGERY

## 2022-03-15 PROCEDURE — 3600000003 HC SURGERY LEVEL 3 BASE: Performed by: SURGERY

## 2022-03-15 PROCEDURE — 12020 TX SUPFC WND DEHSN SMPL CLSR: CPT | Performed by: SURGERY

## 2022-03-15 PROCEDURE — 87635 SARS-COV-2 COVID-19 AMP PRB: CPT

## 2022-03-15 PROCEDURE — 6360000002 HC RX W HCPCS: Performed by: SURGERY

## 2022-03-15 PROCEDURE — 2580000003 HC RX 258: Performed by: NURSE ANESTHETIST, CERTIFIED REGISTERED

## 2022-03-15 PROCEDURE — 3700000001 HC ADD 15 MINUTES (ANESTHESIA): Performed by: SURGERY

## 2022-03-15 PROCEDURE — 7100000001 HC PACU RECOVERY - ADDTL 15 MIN: Performed by: SURGERY

## 2022-03-15 RX ORDER — MEPERIDINE HYDROCHLORIDE 50 MG/ML
12.5 INJECTION INTRAMUSCULAR; INTRAVENOUS; SUBCUTANEOUS EVERY 5 MIN PRN
Status: DISCONTINUED | OUTPATIENT
Start: 2022-03-15 | End: 2022-03-15 | Stop reason: HOSPADM

## 2022-03-15 RX ORDER — DEXAMETHASONE SODIUM PHOSPHATE 4 MG/ML
INJECTION, SOLUTION INTRA-ARTICULAR; INTRALESIONAL; INTRAMUSCULAR; INTRAVENOUS; SOFT TISSUE PRN
Status: DISCONTINUED | OUTPATIENT
Start: 2022-03-15 | End: 2022-03-15 | Stop reason: SDUPTHER

## 2022-03-15 RX ORDER — LIDOCAINE HYDROCHLORIDE 10 MG/ML
1 INJECTION, SOLUTION EPIDURAL; INFILTRATION; INTRACAUDAL; PERINEURAL
Status: DISCONTINUED | OUTPATIENT
Start: 2022-03-15 | End: 2022-03-15 | Stop reason: HOSPADM

## 2022-03-15 RX ORDER — OXYCODONE HYDROCHLORIDE 5 MG/1
10 TABLET ORAL PRN
Status: DISCONTINUED | OUTPATIENT
Start: 2022-03-15 | End: 2022-03-15 | Stop reason: HOSPADM

## 2022-03-15 RX ORDER — CEFAZOLIN SODIUM 1 G/3ML
INJECTION, POWDER, FOR SOLUTION INTRAMUSCULAR; INTRAVENOUS PRN
Status: DISCONTINUED | OUTPATIENT
Start: 2022-03-15 | End: 2022-03-15 | Stop reason: SDUPTHER

## 2022-03-15 RX ORDER — SODIUM CHLORIDE 0.9 % (FLUSH) 0.9 %
10 SYRINGE (ML) INJECTION EVERY 12 HOURS SCHEDULED
Status: DISCONTINUED | OUTPATIENT
Start: 2022-03-15 | End: 2022-03-15 | Stop reason: HOSPADM

## 2022-03-15 RX ORDER — ONDANSETRON 2 MG/ML
4 INJECTION INTRAMUSCULAR; INTRAVENOUS
Status: DISCONTINUED | OUTPATIENT
Start: 2022-03-15 | End: 2022-03-15 | Stop reason: HOSPADM

## 2022-03-15 RX ORDER — ONDANSETRON 2 MG/ML
INJECTION INTRAMUSCULAR; INTRAVENOUS PRN
Status: DISCONTINUED | OUTPATIENT
Start: 2022-03-15 | End: 2022-03-15 | Stop reason: SDUPTHER

## 2022-03-15 RX ORDER — SODIUM CHLORIDE, SODIUM LACTATE, POTASSIUM CHLORIDE, CALCIUM CHLORIDE 600; 310; 30; 20 MG/100ML; MG/100ML; MG/100ML; MG/100ML
INJECTION, SOLUTION INTRAVENOUS CONTINUOUS
Status: DISCONTINUED | OUTPATIENT
Start: 2022-03-15 | End: 2022-03-15 | Stop reason: HOSPADM

## 2022-03-15 RX ORDER — OXYCODONE HYDROCHLORIDE 5 MG/1
5 TABLET ORAL PRN
Status: DISCONTINUED | OUTPATIENT
Start: 2022-03-15 | End: 2022-03-15 | Stop reason: HOSPADM

## 2022-03-15 RX ORDER — SODIUM CHLORIDE 0.9 % (FLUSH) 0.9 %
5-40 SYRINGE (ML) INJECTION EVERY 12 HOURS SCHEDULED
Status: DISCONTINUED | OUTPATIENT
Start: 2022-03-15 | End: 2022-03-15 | Stop reason: HOSPADM

## 2022-03-15 RX ORDER — SODIUM CHLORIDE, SODIUM LACTATE, POTASSIUM CHLORIDE, CALCIUM CHLORIDE 600; 310; 30; 20 MG/100ML; MG/100ML; MG/100ML; MG/100ML
INJECTION, SOLUTION INTRAVENOUS CONTINUOUS PRN
Status: DISCONTINUED | OUTPATIENT
Start: 2022-03-15 | End: 2022-03-15 | Stop reason: SDUPTHER

## 2022-03-15 RX ORDER — LEVOFLOXACIN 750 MG/1
750 TABLET ORAL DAILY
Qty: 5 TABLET | Refills: 0 | Status: SHIPPED | OUTPATIENT
Start: 2022-03-15 | End: 2022-03-20

## 2022-03-15 RX ORDER — DIPHENHYDRAMINE HYDROCHLORIDE 50 MG/ML
12.5 INJECTION INTRAMUSCULAR; INTRAVENOUS
Status: DISCONTINUED | OUTPATIENT
Start: 2022-03-15 | End: 2022-03-15 | Stop reason: HOSPADM

## 2022-03-15 RX ORDER — GLYCOPYRROLATE 0.2 MG/ML
INJECTION INTRAMUSCULAR; INTRAVENOUS PRN
Status: DISCONTINUED | OUTPATIENT
Start: 2022-03-15 | End: 2022-03-15 | Stop reason: SDUPTHER

## 2022-03-15 RX ORDER — FENTANYL CITRATE 50 UG/ML
INJECTION, SOLUTION INTRAMUSCULAR; INTRAVENOUS PRN
Status: DISCONTINUED | OUTPATIENT
Start: 2022-03-15 | End: 2022-03-15 | Stop reason: SDUPTHER

## 2022-03-15 RX ORDER — SODIUM CHLORIDE 0.9 % (FLUSH) 0.9 %
5-40 SYRINGE (ML) INJECTION PRN
Status: DISCONTINUED | OUTPATIENT
Start: 2022-03-15 | End: 2022-03-15 | Stop reason: HOSPADM

## 2022-03-15 RX ORDER — PROPOFOL 10 MG/ML
INJECTION, EMULSION INTRAVENOUS PRN
Status: DISCONTINUED | OUTPATIENT
Start: 2022-03-15 | End: 2022-03-15 | Stop reason: SDUPTHER

## 2022-03-15 RX ORDER — LABETALOL HYDROCHLORIDE 5 MG/ML
10 INJECTION, SOLUTION INTRAVENOUS
Status: DISCONTINUED | OUTPATIENT
Start: 2022-03-15 | End: 2022-03-15 | Stop reason: HOSPADM

## 2022-03-15 RX ORDER — SODIUM CHLORIDE 0.9 % (FLUSH) 0.9 %
10 SYRINGE (ML) INJECTION PRN
Status: DISCONTINUED | OUTPATIENT
Start: 2022-03-15 | End: 2022-03-15 | Stop reason: HOSPADM

## 2022-03-15 RX ORDER — SODIUM CHLORIDE 9 MG/ML
25 INJECTION, SOLUTION INTRAVENOUS PRN
Status: DISCONTINUED | OUTPATIENT
Start: 2022-03-15 | End: 2022-03-15 | Stop reason: HOSPADM

## 2022-03-15 RX ADMIN — GLYCOPYRROLATE 0.2 MG: 0.2 INJECTION, SOLUTION INTRAMUSCULAR; INTRAVENOUS at 13:35

## 2022-03-15 RX ADMIN — FENTANYL CITRATE 50 MCG: 50 INJECTION INTRAMUSCULAR; INTRAVENOUS at 13:30

## 2022-03-15 RX ADMIN — DEXAMETHASONE SODIUM PHOSPHATE 4 MG: 4 INJECTION, SOLUTION INTRAMUSCULAR; INTRAVENOUS at 13:30

## 2022-03-15 RX ADMIN — SODIUM CHLORIDE, SODIUM LACTATE, POTASSIUM CHLORIDE, AND CALCIUM CHLORIDE: .6; .31; .03; .02 INJECTION, SOLUTION INTRAVENOUS at 13:01

## 2022-03-15 RX ADMIN — ONDANSETRON 4 MG: 2 INJECTION INTRAMUSCULAR; INTRAVENOUS at 13:30

## 2022-03-15 RX ADMIN — FENTANYL CITRATE 50 MCG: 50 INJECTION INTRAMUSCULAR; INTRAVENOUS at 13:05

## 2022-03-15 RX ADMIN — CEFAZOLIN 2000 MG: 1 INJECTION, POWDER, FOR SOLUTION INTRAMUSCULAR; INTRAVENOUS at 13:20

## 2022-03-15 RX ADMIN — PROPOFOL 200 MG: 10 INJECTION, EMULSION INTRAVENOUS at 13:05

## 2022-03-15 ASSESSMENT — PULMONARY FUNCTION TESTS
PIF_VALUE: 11
PIF_VALUE: 0
PIF_VALUE: 11
PIF_VALUE: 10
PIF_VALUE: 11
PIF_VALUE: 0
PIF_VALUE: 5
PIF_VALUE: 11
PIF_VALUE: 5
PIF_VALUE: 11
PIF_VALUE: 10
PIF_VALUE: 11
PIF_VALUE: 2
PIF_VALUE: 3
PIF_VALUE: 11
PIF_VALUE: 11
PIF_VALUE: 10
PIF_VALUE: 12
PIF_VALUE: 11
PIF_VALUE: 1
PIF_VALUE: 3
PIF_VALUE: 11
PIF_VALUE: 11
PIF_VALUE: 2
PIF_VALUE: 11
PIF_VALUE: 11
PIF_VALUE: 0
PIF_VALUE: 5
PIF_VALUE: 0
PIF_VALUE: 3
PIF_VALUE: 12

## 2022-03-15 ASSESSMENT — PAIN - FUNCTIONAL ASSESSMENT: PAIN_FUNCTIONAL_ASSESSMENT: 0-10

## 2022-03-15 ASSESSMENT — LIFESTYLE VARIABLES: SMOKING_STATUS: 1

## 2022-03-15 NOTE — ANESTHESIA PRE PROCEDURE
Department of Anesthesiology  Preprocedure Note       Name:  Sindi Garzon   Age:  78 y.o.  :  1955                                          MRN:  [id number]         Date:  3/15/2022      Surgeon: Mercedez Farr):  Marissa Corona MD    Procedure: Procedure(s):  WOUND IRRIGATION AND CLOSURE RIGHT LEG SURGICAL WOUND    Medications prior to admission:   Prior to Admission medications    Medication Sig Start Date End Date Taking? Authorizing Provider   oxyCODONE (ROXICODONE) 5 MG immediate release tablet Take 1 tablet by mouth every 6 hours as needed for Pain for up to 7 days. Intended supply: 7 days. Take lowest dose possible to manage pain 3/11/22 3/18/22 Yes Marissa Corona MD   metoprolol succinate (TOPROL XL) 25 MG extended release tablet Take 1 tablet by mouth daily 22  Yes Prema Reynolds MD   lisinopril (PRINIVIL;ZESTRIL) 5 MG tablet Take 1 tablet by mouth daily 21  Yes Yoly Prince MD   rosuvastatin (CRESTOR) 20 MG tablet Take 1 tablet by mouth daily 10/21/21  Yes Yoly Prince MD   aspirin 81 MG chewable tablet Take 81 mg by mouth daily.    Yes Historical Provider, MD       Current medications:    Current Facility-Administered Medications   Medication Dose Route Frequency Provider Last Rate Last Admin    lidocaine PF 1 % injection 1 mL  1 mL IntraDERmal Once PRN Ana Almazan MD        lactated ringers infusion   IntraVENous Continuous Ana Almazan MD        sodium chloride flush 0.9 % injection 10 mL  10 mL IntraVENous 2 times per day Ana Almazan MD        sodium chloride flush 0.9 % injection 10 mL  10 mL IntraVENous PRN Ana Almazan MD        0.9 % sodium chloride infusion  25 mL IntraVENous PRN Ana Almazan MD           Allergies:  No Known Allergies    Problem List:    Patient Active Problem List   Diagnosis Code    PVD (peripheral vascular disease) ([facility] I73.9    Abnormal EKG R94.31    Claudication in peripheral vascular disease ([facility] I73.9  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    PVD (peripheral vascular disease) with claudication (HCC) I73.9       Past Medical History:        Diagnosis Date    CAD (coronary artery disease)     Hyperlipidemia     Hypertension     No longer taking meds    Thyroid disease     No longer taking meds    Wears dentures     upper       Past Surgical History:        Procedure Laterality Date    ARTERIAL BYPASS SURGRY      triple in 2009   [address]  2011    CABG x3 vessel    FEMORAL BYPASS Left 12/29/2021    LEFT FEMORAL POPLITEAL BYPASS performed by Vero King MD at [address] [place] [address] Right 3/10/2022    RIGHT BELOW THE KNEE POPLITEAL INSITU BYPASS GRAFT performed by Vero iKng MD at [place] [address] History:    Social History     Tobacco Use    Smoking status: Current Every Day Smoker     Packs/day: 2.00     Types: Cigarettes    Smokeless tobacco: Never Used    Tobacco comment: 3/7/22 -  now smoking 3-7 cigs/day   Substance Use Topics    Alcohol use: No                                Ready to quit: Not Answered  Counseling given: Not Answered  Comment: 3/7/22 -  now smoking 3-7 cigs/day      Vital Signs (Current):   Vitals:    03/14/22 1450 03/15/22 1211   BP:  (!) 167/87   Pulse:  68   Resp:  16   Temp:  98.4 °F (36.9 °C)   TempSrc:  Temporal   SpO2:  97%   Weight: 225 lb (102.1 kg)    Height: 5' 8\" (1.727 m)                                               BP Readings from Last 3 Encounters:   03/15/22 (!) 167/87   03/14/22 (!) 185/84   03/11/22 (!) 141/72       NPO Status: Time of last liquid consumption: 1900                        Time of last solid consumption: 2355                        Date of last liquid consumption: 03/14/22                        Date of last solid food consumption: 03/14/22    BMI:   Wt Readings from Last 3 Encounters:   03/14/22 225 lb (102.1 kg)   03/14/22 225 lb (102.1 kg)   03/10/22 230 lb 4 oz (104.4 kg)     Body mass index is 34.21 kg/m².     CBC:   Lab Results   Component Value Date    WBC 8.9 03/11/2022    RBC 4.34 03/11/2022    HGB 13.5 03/11/2022    HCT 40.7 03/11/2022    MCV 93.9 03/11/2022    RDW 14.2 03/11/2022     03/11/2022       CMP:   Lab Results   Component Value Date     03/11/2022    K 4.4 03/11/2022    CL 98 03/11/2022    CO2 28 03/11/2022    BUN 15 03/11/2022    CREATININE 0.8 03/11/2022    GFRAA >60 03/11/2022    LABGLOM >60 03/11/2022    GLUCOSE 191 03/11/2022    PROT 7.2 09/17/2010    CALCIUM 9.3 03/11/2022    BILITOT 0.30 09/17/2010    ALKPHOS 56 09/17/2010    AST 23 09/17/2010    ALT 24 09/17/2010       POC Tests: No results for input(s): POCGLU, POCNA, POCK, POCCL, POCBUN, POCHEMO, POCHCT in the last 72 hours. Coags: No results found for: PROTIME, INR, APTT    HCG (If Applicable): No results found for: PREGTESTUR, PREGSERUM, HCG, HCGQUANT     ABGs: No results found for: PHART, PO2ART, KGI8STW, XQX8RLK, BEART, F0IHLZUM     Type & Screen (If Applicable):  No results found for: LABABO, LABRH    Drug/Infectious Status (If Applicable):  No results found for: HIV, HEPCAB    COVID-19 Screening (If Applicable):   Lab Results   Component Value Date    COVID19 Not Detected 03/07/2022           Anesthesia Evaluation   no history of anesthetic complications:   Airway: Mallampati: II  TM distance: <3 FB   Neck ROM: limited  Mouth opening: > = 3 FB Dental:          Pulmonary:   (+) current smoker                           Cardiovascular:    (+) hypertension:, CAD:,                   Neuro/Psych:               GI/Hepatic/Renal: Neg GI/Hepatic/Renal ROS            Endo/Other: Negative Endo/Other ROS                    Abdominal:             Vascular:   + PVD, aortic or cerebral, . Other Findings:             Anesthesia Plan      general     ASA 3     (Pt agrees to risks, benefits and alternatives of GA  Questions answered. Willing to proceed with plan.)  Induction: intravenous.       Anesthetic plan and risks discussed with patient and spouse.                       Stanton Fischer MD   3/15/2022

## 2022-03-15 NOTE — ANESTHESIA POSTPROCEDURE EVALUATION
Department of Anesthesiology  Postprocedure Note    Patient: Silvia Solis  MRN: [id number]  YOB: 1955  Date of evaluation: 3/15/2022  Time:  7:59 PM     Procedure Summary     Date: 03/15/22 Room / Location: [facility]    Anesthesia Start: 1301 Anesthesia Stop: 1314    Procedure: WOUND IRRIGATION AND CLOSURE RIGHT LEG SURGICAL WOUND (Right ) Diagnosis:       Dehiscence of operative wound, initial encounter      (OPEN RIGHT LEG SURGICAL WOUND)    Surgeons: Coral Garcia MD Responsible Provider: Tae Gary MD    Anesthesia Type: general ASA Status: 3          Anesthesia Type: general    Ernesto Phase I: Ernesto Score: 10    Ernesto Phase II:      Last vitals: Reviewed and per EMR flowsheets. Anesthesia Post Evaluation    Patient location during evaluation: PACU  Patient participation: complete - patient participated  Level of consciousness: awake and alert  Airway patency: patent  Nausea & Vomiting: no nausea and no vomiting  Complications: no  Cardiovascular status: blood pressure returned to baseline  Respiratory status: acceptable  Hydration status: euvolemic  Comments: VSS on transfer to phase 2 recovery. No anesthetic complications.

## 2022-03-15 NOTE — H&P
Outpatient Follow Up Note    Aruna Mccord is 78 y.o. male who presents today for treatment of RLE wound. Patient s/p bypass 6 days ago. Reports was walking and lower leg wound \"opened up\". Past Medical History:   Diagnosis Date    CAD (coronary artery disease)     Hyperlipidemia     Hypertension     No longer taking meds    Thyroid disease     No longer taking meds    Wears dentures     upper       Past Surgical History:   Procedure Laterality Date    ARTERIAL BYPASS SURGRY      triple in 2009   [address]  2011    CABG x3 vessel    FEMORAL BYPASS Left 12/29/2021    LEFT FEMORAL POPLITEAL BYPASS performed by Harpreet Jack MD at [address] [place] [address] Right 3/10/2022    RIGHT BELOW THE KNEE POPLITEAL INSITU BYPASS GRAFT performed by Harpreet Jack MD at [place] [address] History:    Social History     Tobacco Use   Smoking Status Current Every Day Smoker    Packs/day: 2.00    Types: Cigarettes   Smokeless Tobacco Never Used   Tobacco Comment    3/7/22 -  now smoking 3-7 cigs/day     Current Medications:  Current Facility-Administered Medications   Medication Dose Route Frequency Provider Last Rate Last Admin    lidocaine PF 1 % injection 1 mL  1 mL IntraDERmal Once PRN Sloan Calabrese MD        lactated ringers infusion   IntraVENous Continuous Sloan Calabrese MD        sodium chloride flush 0.9 % injection 10 mL  10 mL IntraVENous 2 times per day Sloan Calabrese MD        sodium chloride flush 0.9 % injection 10 mL  10 mL IntraVENous PRN Sloan Calabrese MD        0.9 % sodium chloride infusion  25 mL IntraVENous PRN Sloan Calabrese MD         Allergies:  Patient has no known allergies. REVIEW OF SYSTEMS:   A 14 point review of systems was completed. Pertinent positives identified in the HPI, all other review of systems negative.       Objective:   PHYSICAL EXAM:        VITALS:  Ht 5' 8\" (1.727 m)   Wt 225 lb (102.1 kg)   BMI 34.21 kg/m²   CONSTITUTIONAL: Cooperative, no apparent distress, and appears well nourished / developed  NEUROLOGIC:  Awake and oriented to person, place and time. PSYCH: Calm affect. SKIN: Warm and dry. HEENT: Sclera non-icteric, normocephalic, neck supple, normal carotid pulses with no bruits and thyroid normal size. LUNGS:  No increased work of breathing and clear to auscultation, no crackles or wheezing  CARDIOVASCULAR:  Regular rate and rhythm with no murmurs, gallops, rubs, or abnormal heart sounds, normal PMI. Pulses:    DP   RIGHT 2   LEFT 2     ABDOMEN:  Normal bowel sounds, non-distended and non-tender to palpation  EXT: No edema, no calf tenderness. R leg wound with dehisced skin. Assessment:   Wound dehiscence s/p bypass    Plan:   Operative irrigation and closure. Procedure, risks, benefits, and alternatives.           Trini Barker MD, FACS

## 2022-03-16 NOTE — OP NOTE
[address] [address]                                OPERATIVE REPORT    PATIENT NAME: Hilary Beckham                   :        1955  MED REC NO:   [id number]                          ROOM:  ACCOUNT NO:   [de-identified]                           ADMIT DATE: 03/15/2022  PROVIDER:     Roland Maldonado MD    DATE OF PROCEDURE:  03/15/2022    PREOPERATIVE DIAGNOSIS:  Partial dehiscence of right lower leg wound  status post bypass procedure. POSTOPERATIVE DIAGNOSIS:  Partial dehiscence of right lower leg wound  status post bypass procedure. OPERATION PERFORMED:  Antibiotic irrigation and re-closure of right  lower leg wounds. SURGEON:  Roland Maldonado MD    ANESTHESIA:  General endotracheal.    INDICATION:  The patient is a 79-year-old male who approximately 6 days  ago underwent right femoral to below-knee popliteal bypass procedure. The patient has had significant swelling of the lower leg and the  proximal calf incision has dehisced; however, the subcutaneous tissues  remained intact. The patient is brought to the operating room at this  time for irrigation and re-closure. OPERATIVE PROCEDURE:  The patient was brought to the operating room and  placed in the supine position. General endotracheal anesthesia induced. After adequate anesthesia, the right lower extremity was prepped and  draped in sterile fashion. The right lower leg wound was copiously  irrigated with antibiotic saline solution. Subcutaneous tissues were  then reapproximated using multiple interrupted 3-0 Vicryl sutures and  the skin edges then reapproximated using multiple interrupted 4-0 nylon  vertical mattress sutures. Sterile dressings were then applied. There  were no complications to this procedure. At the end of procedure,  sponge, needle and instrument counts were correct.   Estimated blood loss  was minimal.  The patient was extubated in the operating room and  transferred to the recovery area in a stable condition.         Rhonda Mena MD    D: 03/15/2022 18:29:59       T: 03/15/2022 18:32:17     [email or web address]  Job#: [id number]     Doc#: [id number]    CC:

## 2022-03-25 ENCOUNTER — OFFICE VISIT (OUTPATIENT)
Dept: VASCULAR SURGERY | Age: 67
End: 2022-03-25

## 2022-03-25 VITALS
WEIGHT: 225 LBS | SYSTOLIC BLOOD PRESSURE: 130 MMHG | BODY MASS INDEX: 34.1 KG/M2 | HEIGHT: 68 IN | DIASTOLIC BLOOD PRESSURE: 78 MMHG

## 2022-03-25 DIAGNOSIS — Z09 POSTOPERATIVE EXAMINATION: Primary | ICD-10-CM

## 2022-03-25 PROCEDURE — 99024 POSTOP FOLLOW-UP VISIT: CPT | Performed by: SURGERY

## 2022-03-25 NOTE — PROGRESS NOTES
Postop Progress Note    Subjective    Monika Cabot presents to the office for postop follow up. No drainage from wounds. Trying to elevated leg    Objective    Vitals:    03/25/22 1308   BP: 130/78     General: alert, cooperative and no distress  Incisions:  Proximal R leg incisions healing nicely. Mid calf incisions with sutures intact. Several small pinpoint areas of serous drainage. No erythema. Leg remains edematous. Palpable graft and PT pulse. Assessment  S/P debridement and wound closure   S/P RLE bypass  Need better edema control    Plan  Partial suture removal from proximal incisions. [place]calf incisions suture left in place.    Leg wrapped with ACE and re instructed on importance of leg elevation, compression to allow for wounds to heal.   RTC in 2 weeks for suture removal.     Electronically signed by Mya Patel MD on 3/25/2022 at 2:25 PM

## 2022-04-08 ENCOUNTER — OFFICE VISIT (OUTPATIENT)
Dept: VASCULAR SURGERY | Age: 67
End: 2022-04-08

## 2022-04-08 VITALS
HEIGHT: 68 IN | WEIGHT: 225 LBS | BODY MASS INDEX: 34.1 KG/M2 | SYSTOLIC BLOOD PRESSURE: 168 MMHG | DIASTOLIC BLOOD PRESSURE: 80 MMHG

## 2022-04-08 DIAGNOSIS — Z09 POSTOPERATIVE EXAMINATION: Primary | ICD-10-CM

## 2022-04-08 DIAGNOSIS — Z95.828 H/O EXTREMITY BYPASS GRAFT: ICD-10-CM

## 2022-04-08 DIAGNOSIS — I73.9 PVD (PERIPHERAL VASCULAR DISEASE) (HCC): ICD-10-CM

## 2022-04-08 PROCEDURE — 99024 POSTOP FOLLOW-UP VISIT: CPT | Performed by: SURGERY

## 2022-04-08 NOTE — PROGRESS NOTES
Postop Progress Note    Subjective    Temo Nobles presents to the office for postop follow up. Reports no issues. Wounds healed without drainage. Objective    Vitals:    04/08/22 0852   BP: (!) 168/80     General: alert, cooperative and no distress  Incisions: well healed. Palp bilateral graft pulses. Assessment  Doing well postoperatively. Plan  All sutures removed today.    Recc compression/support stockings for edema control  Bilateral graft duplex    Electronically signed by Moe Mazariegos MD on 4/8/2022 at 9:03 AM

## 2022-04-22 RX ORDER — ROSUVASTATIN CALCIUM 20 MG/1
20 TABLET, COATED ORAL DAILY
Qty: 90 TABLET | Refills: 1 | Status: SHIPPED | OUTPATIENT
Start: 2022-04-22

## 2022-07-10 ENCOUNTER — APPOINTMENT (OUTPATIENT)
Dept: CT IMAGING | Age: 67
End: 2022-07-10
Payer: MEDICARE

## 2022-07-10 ENCOUNTER — HOSPITAL ENCOUNTER (EMERGENCY)
Age: 67
Discharge: ANOTHER HOSPITAL | End: 2022-07-10
Attending: STUDENT IN AN ORGANIZED HEALTH CARE EDUCATION/TRAINING PROGRAM
Payer: MEDICARE

## 2022-07-10 ENCOUNTER — HOSPITAL ENCOUNTER (INPATIENT)
Age: 67
LOS: 4 days | Discharge: HOME HEALTH | DRG: 571 | End: 2022-07-14
Attending: INTERNAL MEDICINE | Admitting: INTERNAL MEDICINE
Payer: MEDICARE

## 2022-07-10 VITALS
RESPIRATION RATE: 20 BRPM | BODY MASS INDEX: 34.86 KG/M2 | WEIGHT: 230 LBS | TEMPERATURE: 97.9 F | OXYGEN SATURATION: 94 % | DIASTOLIC BLOOD PRESSURE: 56 MMHG | SYSTOLIC BLOOD PRESSURE: 144 MMHG | HEART RATE: 76 BPM | HEIGHT: 68 IN

## 2022-07-10 DIAGNOSIS — L03.119 CELLULITIS AND ABSCESS OF LEG: Primary | ICD-10-CM

## 2022-07-10 DIAGNOSIS — L02.419 CELLULITIS AND ABSCESS OF LEG: Primary | ICD-10-CM

## 2022-07-10 DIAGNOSIS — A49.1 GROUP B STREPTOCOCCAL INFECTION: Primary | ICD-10-CM

## 2022-07-10 PROBLEM — L02.214 ABSCESS OF GROIN, LEFT: Status: ACTIVE | Noted: 2022-07-10

## 2022-07-10 PROBLEM — L02.214 GROIN ABSCESS: Status: ACTIVE | Noted: 2022-07-10

## 2022-07-10 LAB
A/G RATIO: 1.1 (ref 1.1–2.2)
ALBUMIN SERPL-MCNC: 4.1 G/DL (ref 3.4–5)
ALP BLD-CCNC: 93 U/L (ref 40–129)
ALT SERPL-CCNC: 18 U/L (ref 10–40)
ANION GAP SERPL CALCULATED.3IONS-SCNC: 11 MMOL/L (ref 3–16)
AST SERPL-CCNC: 20 U/L (ref 15–37)
BASOPHILS ABSOLUTE: 0 K/UL (ref 0–0.2)
BASOPHILS RELATIVE PERCENT: 0.5 %
BILIRUB SERPL-MCNC: 0.7 MG/DL (ref 0–1)
BUN BLDV-MCNC: 18 MG/DL (ref 7–20)
CALCIUM SERPL-MCNC: 9.4 MG/DL (ref 8.3–10.6)
CHLORIDE BLD-SCNC: 97 MMOL/L (ref 99–110)
CO2: 28 MMOL/L (ref 21–32)
CREAT SERPL-MCNC: 0.7 MG/DL (ref 0.8–1.3)
EOSINOPHILS ABSOLUTE: 0 K/UL (ref 0–0.6)
EOSINOPHILS RELATIVE PERCENT: 0.5 %
GFR AFRICAN AMERICAN: >60
GFR NON-AFRICAN AMERICAN: >60
GLUCOSE BLD-MCNC: 188 MG/DL (ref 70–99)
HCT VFR BLD CALC: 44.1 % (ref 40.5–52.5)
HEMOGLOBIN: 14.8 G/DL (ref 13.5–17.5)
LACTIC ACID: 1.1 MMOL/L (ref 0.4–2)
LYMPHOCYTES ABSOLUTE: 0.9 K/UL (ref 1–5.1)
LYMPHOCYTES RELATIVE PERCENT: 10.3 %
MCH RBC QN AUTO: 31 PG (ref 26–34)
MCHC RBC AUTO-ENTMCNC: 33.6 G/DL (ref 31–36)
MCV RBC AUTO: 92.2 FL (ref 80–100)
MONOCYTES ABSOLUTE: 0.9 K/UL (ref 0–1.3)
MONOCYTES RELATIVE PERCENT: 9.8 %
NEUTROPHILS ABSOLUTE: 7 K/UL (ref 1.7–7.7)
NEUTROPHILS RELATIVE PERCENT: 78.9 %
PDW BLD-RTO: 14.6 % (ref 12.4–15.4)
PLATELET # BLD: 214 K/UL (ref 135–450)
PMV BLD AUTO: 6.7 FL (ref 5–10.5)
POTASSIUM REFLEX MAGNESIUM: 3.9 MMOL/L (ref 3.5–5.1)
RBC # BLD: 4.78 M/UL (ref 4.2–5.9)
SODIUM BLD-SCNC: 136 MMOL/L (ref 136–145)
TOTAL PROTEIN: 7.8 G/DL (ref 6.4–8.2)
TROPONIN: <0.01 NG/ML
WBC # BLD: 8.9 K/UL (ref 4–11)

## 2022-07-10 PROCEDURE — 85025 COMPLETE CBC W/AUTO DIFF WBC: CPT

## 2022-07-10 PROCEDURE — 73701 CT LOWER EXTREMITY W/DYE: CPT

## 2022-07-10 PROCEDURE — 83605 ASSAY OF LACTIC ACID: CPT

## 2022-07-10 PROCEDURE — 96367 TX/PROPH/DG ADDL SEQ IV INF: CPT

## 2022-07-10 PROCEDURE — 96366 THER/PROPH/DIAG IV INF ADDON: CPT

## 2022-07-10 PROCEDURE — 6360000002 HC RX W HCPCS: Performed by: INTERNAL MEDICINE

## 2022-07-10 PROCEDURE — 84484 ASSAY OF TROPONIN QUANT: CPT

## 2022-07-10 PROCEDURE — 96375 TX/PRO/DX INJ NEW DRUG ADDON: CPT

## 2022-07-10 PROCEDURE — 6360000002 HC RX W HCPCS

## 2022-07-10 PROCEDURE — 99285 EMERGENCY DEPT VISIT HI MDM: CPT

## 2022-07-10 PROCEDURE — 6360000002 HC RX W HCPCS: Performed by: PHYSICIAN ASSISTANT

## 2022-07-10 PROCEDURE — 80053 COMPREHEN METABOLIC PANEL: CPT

## 2022-07-10 PROCEDURE — 2580000003 HC RX 258

## 2022-07-10 PROCEDURE — 1200000000 HC SEMI PRIVATE

## 2022-07-10 PROCEDURE — 6360000004 HC RX CONTRAST MEDICATION: Performed by: PHYSICIAN ASSISTANT

## 2022-07-10 PROCEDURE — 87040 BLOOD CULTURE FOR BACTERIA: CPT

## 2022-07-10 PROCEDURE — 96365 THER/PROPH/DIAG IV INF INIT: CPT

## 2022-07-10 PROCEDURE — 2580000003 HC RX 258: Performed by: PHYSICIAN ASSISTANT

## 2022-07-10 PROCEDURE — 2580000003 HC RX 258: Performed by: INTERNAL MEDICINE

## 2022-07-10 PROCEDURE — 36415 COLL VENOUS BLD VENIPUNCTURE: CPT

## 2022-07-10 RX ORDER — SODIUM CHLORIDE 0.9 % (FLUSH) 0.9 %
5-40 SYRINGE (ML) INJECTION PRN
Status: DISCONTINUED | OUTPATIENT
Start: 2022-07-10 | End: 2022-07-14 | Stop reason: HOSPADM

## 2022-07-10 RX ORDER — MORPHINE SULFATE 2 MG/ML
2 INJECTION, SOLUTION INTRAMUSCULAR; INTRAVENOUS ONCE
Status: COMPLETED | OUTPATIENT
Start: 2022-07-10 | End: 2022-07-10

## 2022-07-10 RX ORDER — MORPHINE SULFATE 2 MG/ML
4 INJECTION, SOLUTION INTRAMUSCULAR; INTRAVENOUS EVERY 4 HOURS PRN
Status: DISCONTINUED | OUTPATIENT
Start: 2022-07-10 | End: 2022-07-11

## 2022-07-10 RX ORDER — ACETAMINOPHEN 650 MG/1
650 SUPPOSITORY RECTAL EVERY 6 HOURS PRN
Status: DISCONTINUED | OUTPATIENT
Start: 2022-07-10 | End: 2022-07-14 | Stop reason: HOSPADM

## 2022-07-10 RX ORDER — SODIUM CHLORIDE 0.9 % (FLUSH) 0.9 %
5-40 SYRINGE (ML) INJECTION EVERY 12 HOURS SCHEDULED
Status: DISCONTINUED | OUTPATIENT
Start: 2022-07-10 | End: 2022-07-14 | Stop reason: HOSPADM

## 2022-07-10 RX ORDER — HEPARIN SODIUM 5000 [USP'U]/ML
5000 INJECTION, SOLUTION INTRAVENOUS; SUBCUTANEOUS EVERY 8 HOURS SCHEDULED
Status: DISCONTINUED | OUTPATIENT
Start: 2022-07-10 | End: 2022-07-14 | Stop reason: HOSPADM

## 2022-07-10 RX ORDER — ROSUVASTATIN CALCIUM 20 MG/1
20 TABLET, COATED ORAL DAILY
Status: DISCONTINUED | OUTPATIENT
Start: 2022-07-11 | End: 2022-07-14 | Stop reason: HOSPADM

## 2022-07-10 RX ORDER — POLYETHYLENE GLYCOL 3350 17 G/17G
17 POWDER, FOR SOLUTION ORAL DAILY PRN
Status: DISCONTINUED | OUTPATIENT
Start: 2022-07-10 | End: 2022-07-14 | Stop reason: HOSPADM

## 2022-07-10 RX ORDER — ONDANSETRON 2 MG/ML
4 INJECTION INTRAMUSCULAR; INTRAVENOUS EVERY 6 HOURS PRN
Status: DISCONTINUED | OUTPATIENT
Start: 2022-07-10 | End: 2022-07-14 | Stop reason: HOSPADM

## 2022-07-10 RX ORDER — ACETAMINOPHEN 325 MG/1
650 TABLET ORAL EVERY 6 HOURS PRN
Status: DISCONTINUED | OUTPATIENT
Start: 2022-07-10 | End: 2022-07-14 | Stop reason: HOSPADM

## 2022-07-10 RX ORDER — ONDANSETRON 4 MG/1
4 TABLET, ORALLY DISINTEGRATING ORAL EVERY 8 HOURS PRN
Status: DISCONTINUED | OUTPATIENT
Start: 2022-07-10 | End: 2022-07-14 | Stop reason: HOSPADM

## 2022-07-10 RX ORDER — 0.9 % SODIUM CHLORIDE 0.9 %
1000 INTRAVENOUS SOLUTION INTRAVENOUS ONCE
Status: DISCONTINUED | OUTPATIENT
Start: 2022-07-10 | End: 2022-07-10

## 2022-07-10 RX ORDER — SODIUM CHLORIDE 9 MG/ML
INJECTION, SOLUTION INTRAVENOUS PRN
Status: DISCONTINUED | OUTPATIENT
Start: 2022-07-10 | End: 2022-07-14 | Stop reason: HOSPADM

## 2022-07-10 RX ORDER — 0.9 % SODIUM CHLORIDE 0.9 %
500 INTRAVENOUS SOLUTION INTRAVENOUS ONCE
Status: COMPLETED | OUTPATIENT
Start: 2022-07-10 | End: 2022-07-10

## 2022-07-10 RX ADMIN — MORPHINE SULFATE 2 MG: 2 INJECTION, SOLUTION INTRAMUSCULAR; INTRAVENOUS at 12:45

## 2022-07-10 RX ADMIN — CEFEPIME HYDROCHLORIDE 2000 MG: 2 INJECTION, POWDER, FOR SOLUTION INTRAVENOUS at 23:58

## 2022-07-10 RX ADMIN — HEPARIN SODIUM 5000 UNITS: 5000 INJECTION INTRAVENOUS; SUBCUTANEOUS at 23:59

## 2022-07-10 RX ADMIN — IOPAMIDOL 75 ML: 755 INJECTION, SOLUTION INTRAVENOUS at 12:22

## 2022-07-10 RX ADMIN — CEFEPIME 2000 MG: 2 INJECTION, POWDER, FOR SOLUTION INTRAVENOUS at 13:00

## 2022-07-10 RX ADMIN — SODIUM CHLORIDE, PRESERVATIVE FREE 10 ML: 5 INJECTION INTRAVENOUS at 23:59

## 2022-07-10 RX ADMIN — SODIUM CHLORIDE 500 ML: 9 INJECTION, SOLUTION INTRAVENOUS at 13:01

## 2022-07-10 RX ADMIN — VANCOMYCIN HYDROCHLORIDE 2000 MG: 1 INJECTION, POWDER, LYOPHILIZED, FOR SOLUTION INTRAVENOUS at 13:39

## 2022-07-10 ASSESSMENT — PAIN DESCRIPTION - DESCRIPTORS: DESCRIPTORS: SHOOTING;PINS AND NEEDLES

## 2022-07-10 ASSESSMENT — PAIN DESCRIPTION - LOCATION
LOCATION: LEG
LOCATION: LEG

## 2022-07-10 ASSESSMENT — PAIN DESCRIPTION - PAIN TYPE: TYPE: ACUTE PAIN

## 2022-07-10 ASSESSMENT — PAIN DESCRIPTION - ORIENTATION
ORIENTATION: LEFT
ORIENTATION: LEFT

## 2022-07-10 ASSESSMENT — PAIN SCALES - GENERAL
PAINLEVEL_OUTOF10: 9
PAINLEVEL_OUTOF10: 5

## 2022-07-10 ASSESSMENT — PAIN DESCRIPTION - ONSET: ONSET: ON-GOING

## 2022-07-10 ASSESSMENT — PAIN - FUNCTIONAL ASSESSMENT
PAIN_FUNCTIONAL_ASSESSMENT: 0-10
PAIN_FUNCTIONAL_ASSESSMENT: ACTIVITIES ARE NOT PREVENTED

## 2022-07-10 ASSESSMENT — PAIN DESCRIPTION - FREQUENCY: FREQUENCY: INTERMITTENT

## 2022-07-10 NOTE — ED NOTES
1512 call placed to [facility] for Consult with Vascular surgery. Gelacio Portillo  07/10/22 6974    3050 consult completed with call back from Dr. Jeff uSarez spoke with [address]  07/10/22 1600    1837 [facility] called with bed assignment and report #   [id number] Nurse report #: [id number]     Gelacio Portillo  07/10/22 1842    1848 [facility] called with transport.    Prestige with an ETA or [address]  07/10/22 1844

## 2022-07-10 NOTE — ED PROVIDER NOTES
[address] ED  EMERGENCY DEPARTMENT ENCOUNTER        Pt Name: Maria Teresa Tolentino  MRN: [id number]  [place] 1955  Date of evaluation: 7/10/2022  Provider: PEEWEE Dubois  PCP: Joelyn Klinefelter, APRN - CNP    This patient was seen and evaluated by the attending physician Bruno Grace       No chief complaint on file. HISTORY OF PRESENT ILLNESS   (Location/Symptom, Timing/Onset, Context/Setting, Quality, Duration, Modifying Factors, Severity)  Note limiting factors. Maria Teresa Tolentino is a 78 y.o. male with past medical history of peripheral vascular disease, claudication, popliteal aneurysm, wound dehiscence, coronary artery disease, hypertension, hyperlipidemia On Crestor, metoprolol, lisinopril and aspirin who presents via private vehicle from his home for evaluation of left leg pain. Patient notes that since Wednesday he has had proximal femoral redness swelling pain. He endorses subjective fevers at home. Endorses nausea and diarrhea. He denies distal numbness tingling. Denies distal weakness but endorses severe pain and having to walk with a limp. In March 2022 patient had right femoral-popliteal bypass and exclusion of popliteal artery aneurysm. He subsequently had wound dehiscence and was taken back to surgery March 14. Patient had left lower extremity femoral popliteal bypass December 2021. Nursing Notes were all reviewed and agreed with or any disagreements were addressed  in the HPI. Pt was seen during the [place] 19 pandemic. Appropriate PPE worn by ME during patient encounters. Pt seen during a time with constrained hospital bed capacity and other potential inpatient and outpatient resources were constrained due to the viral pandemic. REVIEW OF SYSTEMS    (2-9 systems for level 4, 10 or more for level 5)     Review of Systems    Positives and Pertinent negatives as per HPI.   Except as noted abovein the ROS, all other systems were reviewed and negative. PAST MEDICAL HISTORY     Past Medical History:   Diagnosis Date    CAD (coronary artery disease)     Hyperlipidemia     Hypertension     No longer taking meds    Thyroid disease     No longer taking meds    Wears dentures     upper         SURGICAL HISTORY     Past Surgical History:   Procedure Laterality Date    ARTERIAL BYPASS SURGERY      triple in 2009   [address]  2011    CABG x3 vessel    FEMORAL BYPASS Left 12/29/2021    LEFT FEMORAL POPLITEAL BYPASS performed by Maxine Sousa MD at [address] [facility] Right 3/10/2022    RIGHT BELOW THE KNEE POPLITEAL INSITU BYPASS GRAFT performed by Maxine Sousa MD at [address] Right 3/15/2022    WOUND IRRIGATION AND CLOSURE RIGHT LEG SURGICAL WOUND performed by Maxine Sousa MD at [address] [facility]       Previous Medications    ASPIRIN 81 MG CHEWABLE TABLET    Take 81 mg by mouth daily. LISINOPRIL (PRINIVIL;ZESTRIL) 5 MG TABLET    Take 1 tablet by mouth daily    METOPROLOL SUCCINATE (TOPROL XL) 25 MG EXTENDED RELEASE TABLET    Take 1 tablet by mouth daily    ROSUVASTATIN (CRESTOR) 20 MG TABLET    Take 1 tablet by mouth daily         ALLERGIES     Patient has no known allergies.     FAMILYHISTORY       Family History   Problem Relation Age of Onset    Heart Disease Mother     Stroke Father           SOCIAL HISTORY       Social History     Socioeconomic History    Marital status:      Spouse name: Not on file    Number of children: Not on file    Years of education: Not on file    Highest education level: Not on file   Occupational History    Not on file   Tobacco Use    Smoking status: Current Every Day Smoker     Packs/day: 2.00     Types: Cigarettes    Smokeless tobacco: Never Used    Tobacco comment: 3/7/22 -  now smoking 3-7 cigs/day   Vaping Use    Vaping Use: Never used   Substance and Sexual Activity    Alcohol use: No    Drug use: Not Currently Types: Marijuana [place])    Sexual activity: Not on file   Other Topics Concern    Not on file   Social History Narrative    Not on file     Social Determinants of Health     Financial Resource Strain:     Difficulty of Paying Living Expenses: Not on file   Food Insecurity:     Worried About Running Out of Food in the Last Year: Not on file    Suad of Food in the Last Year: Not on file   Transportation Needs:     Lack of Transportation (Medical): Not on file    Lack of Transportation (Non-Medical): Not on file   Physical Activity:     Days of Exercise per Week: Not on file    Minutes of Exercise per Session: Not on file   Stress:     Feeling of Stress : Not on file   Social Connections:     Frequency of Communication with Friends and Family: Not on file    Frequency of Social Gatherings with Friends and Family: Not on file    Attends Buddhism Services: Not on file    Active Member of [address] [place] [address] [employer] or Organizations: Not on file    Attends Club or Organization Meetings: Not on file    Marital Status: Not on file   Intimate Partner Violence:     Fear of Current or Ex-Partner: Not on file    Emotionally Abused: Not on file    Physically Abused: Not on file    Sexually Abused: Not on file   Housing Stability:     Unable to Pay for Housing in the Last Year: Not on file    Number of [place] in the Last Year: Not on file    Unstable Housing in the Last Year: Not on file       SCREENINGS             PHYSICAL EXAM    (up to 7 for level 4, 8 or more for level 5)     ED Triage Vitals [07/10/22 1045]   BP Temp Temp Source Heart Rate Resp SpO2 Height Weight   (!) 182/95 97.9 °F (36.6 °C) Oral 76 13 96 % 5' 8\" (1.727 m) 230 lb (104.3 kg)       Physical Exam  Vitals and nursing note reviewed. Constitutional:       General: He is not in acute distress. Appearance: He is well-developed. He is not ill-appearing, toxic-appearing or diaphoretic. HENT:      Head: Normocephalic and atraumatic.       Right Ear: External ear normal.      Left Ear: External ear normal.      Nose: Nose normal. No congestion. Mouth/Throat:      Mouth: Mucous membranes are moist.      Pharynx: Oropharynx is clear. Eyes:      General:         Right eye: No discharge. Left eye: No discharge. Extraocular Movements: Extraocular movements intact. Conjunctiva/sclera: Conjunctivae normal.      Pupils: Pupils are equal, round, and reactive to light. Cardiovascular:      Rate and Rhythm: Normal rate and regular rhythm. Pulses:           Radial pulses are 2+ on the right side and 2+ on the left side. Dorsalis pedis pulses are 2+ on the right side and 2+ on the left side. Posterior tibial pulses are 2+ on the right side and 2+ on the left side. Heart sounds: Normal heart sounds. No murmur heard. No friction rub. No gallop. Pulmonary:      Effort: Pulmonary effort is normal. No respiratory distress. Breath sounds: Normal breath sounds. No wheezing or rales. Abdominal:      Palpations: Abdomen is soft. Tenderness: There is no abdominal tenderness. There is no guarding. Musculoskeletal:         General: No deformity or signs of injury. Cervical back: Normal range of motion and neck supple. No rigidity. Right lower leg: No edema. Left lower leg: No edema. Lymphadenopathy:      Cervical: No cervical adenopathy. Skin:     General: Skin is warm and dry. Capillary Refill: Capillary refill takes less than 2 seconds. Neurological:      General: No focal deficit present. Mental Status: He is alert and oriented to person, place, and time. Psychiatric:         Mood and Affect: Mood normal.         Behavior: Behavior normal.             DIAGNOSTIC RESULTS   LABS:    Labs Reviewed - No data to display    All other labs were within normal range or not returned as of this dictation. EKG:  All EKG's are interpreted by the Emergency Department Physician who either signs orCo-signs this chart in the absence of a cardiologist.  Please see their note for interpretation of EKG. RADIOLOGY:   Non-plain film images such as CT, Ultrasound and MRI are read by the radiologist. Plain radiographic images are visualized andpreliminarily interpreted by the  ED Provider with the below findings:        Interpretation perthe Radiologist below, if available at the time of this note:    No orders to display     No results found. PROCEDURES   Unless otherwise noted below, none     Procedures    CRITICAL CARE TIME   I personally saw the patient and independently provided 14 minutes of non-concurrent critical care out of the total shared critical care time provided. This excludes time spent doing separately billable procedures. This includes time at the bedside, data interpretation, medication management, obtaining critical history from collateral sources if the patient is unable to provide it directly, and physician consultation. Specifics of interventions taken and potentially life-threatening diagnostic considerations are listed above in the medical decision making. If this was a shared visit with a Physician the time in this attestation is non-concurrent critical care time out of the total shared critical care time provided by the Physician and myself. CONSULTS:  None      EMERGENCY DEPARTMENT COURSE and DIFFERENTIALDIAGNOSIS/MDM:   Vitals:    Vitals:    07/10/22 1045   BP: (!) 182/95   Pulse: 76   Resp: 13   Temp: 97.9 °F (36.6 °C)   TempSrc: Oral   SpO2: 96%   Weight: 230 lb (104.3 kg)   Height: 5' 8\" (1.727 m)       Patient was given thefollowing medications:  Medications - No data to display    PDMP Monitoring:    Last PDMP Jose as Reviewed [facility]):  Review User Review Instant Review Result            Urine Drug Screenings (1 yr)    No resulted procedures found.        Medication Contract and Consent for Opioid Use Documents Filed      No documents found MDM:   Patient seen and evaluated. Old records reviewed. Diagnostic testing reviewed and results discussed. 25-year-old male presents with left leg pain. He has cellulitis and underlying swelling to area where he is formally had vascular surgery. The swelling is nonpulsatile. Distal pulses are intact and strong. He has no lower extremity weakness. CBC obtained, no acute leukocytosis, metabolic panel Without significant acute electrolyte derangement or evidence of acute hepatic or renal dysfunction. Trope and lactic are within normal limits. CT femur with contrast reveals an 8.2 cm peripherally enhancing fluid collection in the anterior subcutaneous fat of the proximal left thigh with adjacent subcutaneous fat stranding compatible with abscess and cellulitis no evidence of soft tissue gas. No acute osseous abnormality. Likely reactive left inguinal and left iliac chain lymphadenopathy. Patient was discussed with vascular surgery on-call, who agrees with plan for transfer to facility that will allow them to evaluate and manage the patient. Patient was started on broad-spectrum antibiotics in the department. ED Course as of 07/10/22 2029   Sun Jul 10, 2022   1222 Comprehensive Metabolic Panel w/ Reflex to MG(!):    Sodium 136   Potassium 3.9   Chloride 97(!)   CO2 28   Anion Gap 11   GLUCOSE, FASTING,(!)   BUN,BUNPL 18   Creatinine 0.7(!)   GFR Non- >60   GFR  >60   CALCIUM, SERUM, 363323 9.4   Total Protein 7.8   Albumin 4.1   Albumin/Globulin Ratio 1.1   Bilirubin 0.7   Alk Phos 93   ALT 18   AST 20  Metabolic panel without significant acute abnormality.  [CS]   2962 CBC with Auto Differential(!):    WBC 8.9   RBC 4.78   Hemoglobin Quant 14.8   Hematocrit 44.1   MCV 92.2   MCH 31.0   MCHC 33.6   RDW 14.6   Platelet Count 894   MPV 6.7   Neutrophils % 78.9   Lymphocyte % 10.3   Monocytes % 9.8   Eosinophils % 0.5   Basophils % 0.5   Neutrophils Absolute 7.0   Lymphocytes Absolute 0.9(!)   Monocytes Absolute 0.9   Eosinophils Absolute 0.0   Basophils Absolute 0.0  No leukocytosis or anemia. [CS]   1222 Lactic Acid:    Lactic Acid 1.1  To normal limits, no concern for soft tissue ischemia [CS]   1223 Troponin:    Troponin <0.01  Within normal limits. [CS]                                   [address] [facility]  [CS]      ED Course User Index  [CS] Telma Mario  [ER] Gianni Carvajal MD       I spoke with Dr. Keisha Mitchell, hospitalist [address] [place] [address]. We thoroughly discussed the history, physical exam, laboratory and imaging studies, as well as, current course of treatment within the emergency department. Based upon that discussion, we've decided to transfer Stevo Guan to [address] [place] [address], for further observation and evaluation of Stevo Cintrons current condition. As I have deemed necessary from their history, physical, and studies, I have considered and evaluated Stevo Guan for the following diagnoses:      CLINICAL IMPRESSION:  1. Cellulitis and abscess of leg        BP (!) 144/56   Pulse 76   Temp 97.9 °F (36.6 °C) (Oral)   Resp 20   Ht 5' 8\" (1.727 m)   Wt 230 lb (104.3 kg)   SpO2 94%   BMI 34.97 kg/m²       Is this patient to be included in the SEP-1 Core Measure due to severe sepsis or septic shock? No   Exclusion criteria - the patient is NOT to be included for SEP-1 Core Measure due to:  2+ SIRS criteria are not met          FINAL IMPRESSION      1. Cellulitis and abscess of leg          DISPOSITION/PLAN   DISPOSITION        PATIENT REFERREDTO:  No follow-up provider specified.     DISCHARGE MEDICATIONS:  New Prescriptions    No medications on file       DISCONTINUED MEDICATIONS:  Discontinued Medications    No medications on file              (Please note that portions ofthis note were completed with a voice recognition program.  Efforts were made to edit the dictations but occasionally words are mis-transcribed.)    Telma Mario (electronically signed)       Ann-Marie Rios [place]  07/10/22 2031

## 2022-07-10 NOTE — ED PROVIDER NOTES
I independently examined and evaluated Aliza Dunn. I personally saw the patient and performed a substantive portion of the visit including all aspects of the medical decision making    In brief, Aliza Dunn is a 78 y.o. male with a past medical history of vascular disease, popliteal aneurysm, Jerrell artery disease, hypertension, hyperlipidemia and wound dehiscence, who presents to the ED complaining of left groin swelling. Patient has a history of peripheral vascular disease, follows with Dr. Benay Felty of vascular surgery. He has bilateral femoral grafts, will left side was done in December, right side was completed in March. Right side was complicated by wound dehiscence. Patient reports with left groin swelling over the incision site, worsening since Wednesday. He does also report nausea and chills, no documented fevers. Sharp aching pain. No numbness, weakness, tingling. REVIEW OF SYSTEMS  All systems reviewed, pertinent positives per HPI otherwise noted to be negative. Focused exam revealed   PHYSICAL EXAM  BP (!) 149/82   Pulse 73   Temp 97.9 °F (36.6 °C) (Oral)   Resp 29   Ht 5' 8\" (1.727 m)   Wt 230 lb (104.3 kg)   SpO2 95%   BMI 34.97 kg/m²    GENERAL APPEARANCE: Awake and alert. Cooperative. no distress. HENT: Normocephalic. Atraumatic. Mucous membranes are moist  NECK: Supple. Full range of motion of the neck without stiffness or pain. EYES: PERRL. EOM's grossly intact. HEART/CHEST: RRR. No murmurs. Chest wall is not tender to palpation. LUNGS: Respirations unlabored. CTAB. Good air exchange. Speaking comfortably in full sentences. ABDOMEN: No tenderness. Soft. Non-distended. No masses. No organomegaly. No guarding or rebound. MUSCULOSKELETAL: No extremity edema. Compartments soft. No deformity. No tenderness in the extremities. All extremities neurovascularly intact. SKIN: Warm and dry.   Large area of swelling in the left groin at area of incision, warm, tender, erythematous. Bilateral lower extremities neurovascular intact  NEUROLOGICAL: Alert and oriented. No gross facial drooping. Strength 5/5, sensation intact. PSYCHIATRIC: Normal mood and affect. ED course / MDM:   Overall patient presenting for left groin swelling. Physical exam remarkable for left groin swelling and erythema. I personally saw the patient and performed a substantive portion of the visit including all aspects of the medical decision making. Differential diagnosis includes but is not limited to: Abscess, arterial aneurysm, seroma, cellulitis      Workup showed:    Imaging:  CT FEMUR LEFT W CONTRAST   Final Result   1. 8.2 cm peripherally enhancing fluid collection in the anterior   subcutaneous fat of the proximal left thigh with adjacent subcutaneous fat   stranding compatible with an abscess and cellulitis. No soft tissue gas. 2. Mild likely reactive left inguinal and iliac chain lymphadenopathy. 3. No acute osseous abnormality. ED Course as of 07/10/22 1850   Sun Jul 10, 2022   1521 Mild hypochloremia to 97, no other significant electrolyte abnormalities or evidence of kidney dysfunction [ER]   1522 Lactate within normal limits. Patient does not meet SIRS criteria. [ER]   4562 Is this patient to be included in the SEP-1 Core Measure due to severe sepsis or septic shock? No   Exclusion criteria - the patient is NOT to be included for SEP-1 Core Measure due to:  2+ SIRS criteria are not met   [ER]   1522 No leukocytosis, anemia, thrombocytopenia [ER]   1522 Liver function testing unremarkable [ER]   1522 Troponin within normal limits [ER]   1523 CT scan shows findings concerning for large abscess. Abscess is 8.2 cm and is very close to adjacent vascular structures. At this time, do not feel this is an abscess that is appropriate for emergency department management.  [ER]   1623 Vascular surgery recommended transfer for further management [ER]      ED Course User Index  [CS] PEEWEE Nazario  [ER] Leo Gomes MD     Neurosurgery consulted and recommended transfer for further management of abscess by vascular team.  At this time, do feel the patient requires admission for further work-up and management. Discussed the patient with hospital team.  Patient will be transferred to [facility] due to bed availability    CLINICAL IMPRESSION  1. Cellulitis and abscess of leg        Blood pressure (!) 165/90, pulse 74, temperature 97.9 °F (36.6 °C), temperature source Oral, resp. rate 26, height 5' 8\" (1.727 m), weight 230 lb (104.3 kg), SpO2 96 %. DISPOSITION  Alessia Hernandez was transferred to [facility] in stable condition. All diagnostic, treatment, and disposition decisions were made by myself in conjunction with the advanced practice provider. For all further details of the patient's emergency department visit, please see the advanced practice provider's documentation. Comment: Please note this report has been produced using speech recognition software and may contain errors related to that system including errors in grammar, punctuation, and spelling, as well as words and phrases that may be inappropriate. If there are any questions or concerns please feel free to contact the dictating provider for clarification.         Leo Gomes MD  07/10/22 8869

## 2022-07-10 NOTE — ED NOTES
Writer attempted to verify home meds- pt couldn't remember what he takes for thyroid.      Michael Mccray RN  07/10/22 8815

## 2022-07-10 NOTE — ED NOTES
1204  Two (2) sets of blood cultures were drawn from asepically prepared sites in the forearms, using chloroprep swabs for one (1) minute, followed by a one (1) minute drying time.      Jenaro Bell  07/10/22 1215

## 2022-07-11 ENCOUNTER — APPOINTMENT (OUTPATIENT)
Dept: VASCULAR LAB | Age: 67
DRG: 571 | End: 2022-07-11
Attending: INTERNAL MEDICINE
Payer: MEDICARE

## 2022-07-11 LAB
ALBUMIN SERPL-MCNC: 3.6 G/DL (ref 3.4–5)
ANION GAP SERPL CALCULATED.3IONS-SCNC: 9 MMOL/L (ref 3–16)
BASOPHILS ABSOLUTE: 0.1 K/UL (ref 0–0.2)
BASOPHILS RELATIVE PERCENT: 0.7 %
BUN BLDV-MCNC: 14 MG/DL (ref 7–20)
CALCIUM SERPL-MCNC: 8.8 MG/DL (ref 8.3–10.6)
CHLORIDE BLD-SCNC: 98 MMOL/L (ref 99–110)
CO2: 26 MMOL/L (ref 21–32)
CREAT SERPL-MCNC: 0.7 MG/DL (ref 0.8–1.3)
EOSINOPHILS ABSOLUTE: 0 K/UL (ref 0–0.6)
EOSINOPHILS RELATIVE PERCENT: 0.2 %
GFR AFRICAN AMERICAN: >60
GFR NON-AFRICAN AMERICAN: >60
GLUCOSE BLD-MCNC: 172 MG/DL (ref 70–99)
HCT VFR BLD CALC: 39.9 % (ref 40.5–52.5)
HEMOGLOBIN: 13.5 G/DL (ref 13.5–17.5)
INR BLD: 1.16 (ref 0.87–1.14)
LYMPHOCYTES ABSOLUTE: 1.3 K/UL (ref 1–5.1)
LYMPHOCYTES RELATIVE PERCENT: 15.6 %
MAGNESIUM: 1.9 MG/DL (ref 1.8–2.4)
MCH RBC QN AUTO: 30.8 PG (ref 26–34)
MCHC RBC AUTO-ENTMCNC: 33.7 G/DL (ref 31–36)
MCV RBC AUTO: 91.4 FL (ref 80–100)
MONOCYTES ABSOLUTE: 0.9 K/UL (ref 0–1.3)
MONOCYTES RELATIVE PERCENT: 10.2 %
NEUTROPHILS ABSOLUTE: 6.2 K/UL (ref 1.7–7.7)
NEUTROPHILS RELATIVE PERCENT: 73.3 %
PDW BLD-RTO: 14.5 % (ref 12.4–15.4)
PHOSPHORUS: 3 MG/DL (ref 2.5–4.9)
PLATELET # BLD: 207 K/UL (ref 135–450)
PMV BLD AUTO: 7.4 FL (ref 5–10.5)
POTASSIUM SERPL-SCNC: 3.7 MMOL/L (ref 3.5–5.1)
PROTHROMBIN TIME: 14.8 SEC (ref 11.7–14.5)
RBC # BLD: 4.37 M/UL (ref 4.2–5.9)
SODIUM BLD-SCNC: 133 MMOL/L (ref 136–145)
WBC # BLD: 8.5 K/UL (ref 4–11)

## 2022-07-11 PROCEDURE — 2500000003 HC RX 250 WO HCPCS

## 2022-07-11 PROCEDURE — 6360000002 HC RX W HCPCS: Performed by: INTERNAL MEDICINE

## 2022-07-11 PROCEDURE — 6360000002 HC RX W HCPCS: Performed by: NURSE PRACTITIONER

## 2022-07-11 PROCEDURE — 83735 ASSAY OF MAGNESIUM: CPT

## 2022-07-11 PROCEDURE — 6370000000 HC RX 637 (ALT 250 FOR IP): Performed by: STUDENT IN AN ORGANIZED HEALTH CARE EDUCATION/TRAINING PROGRAM

## 2022-07-11 PROCEDURE — 80069 RENAL FUNCTION PANEL: CPT

## 2022-07-11 PROCEDURE — 85025 COMPLETE CBC W/AUTO DIFF WBC: CPT

## 2022-07-11 PROCEDURE — 1200000000 HC SEMI PRIVATE

## 2022-07-11 PROCEDURE — 6360000002 HC RX W HCPCS

## 2022-07-11 PROCEDURE — 36415 COLL VENOUS BLD VENIPUNCTURE: CPT

## 2022-07-11 PROCEDURE — 87077 CULTURE AEROBIC IDENTIFY: CPT

## 2022-07-11 PROCEDURE — 85610 PROTHROMBIN TIME: CPT

## 2022-07-11 PROCEDURE — 93926 LOWER EXTREMITY STUDY: CPT

## 2022-07-11 PROCEDURE — 87641 MR-STAPH DNA AMP PROBE: CPT

## 2022-07-11 PROCEDURE — 6370000000 HC RX 637 (ALT 250 FOR IP)

## 2022-07-11 PROCEDURE — 2580000003 HC RX 258: Performed by: INTERNAL MEDICINE

## 2022-07-11 PROCEDURE — 87205 SMEAR GRAM STAIN: CPT

## 2022-07-11 PROCEDURE — 2580000003 HC RX 258

## 2022-07-11 PROCEDURE — 87070 CULTURE OTHR SPECIMN AEROBIC: CPT

## 2022-07-11 RX ORDER — MAGNESIUM SULFATE IN WATER 40 MG/ML
2000 INJECTION, SOLUTION INTRAVENOUS ONCE
Status: COMPLETED | OUTPATIENT
Start: 2022-07-11 | End: 2022-07-11

## 2022-07-11 RX ORDER — LISINOPRIL 5 MG/1
5 TABLET ORAL DAILY
Status: DISCONTINUED | OUTPATIENT
Start: 2022-07-11 | End: 2022-07-14 | Stop reason: HOSPADM

## 2022-07-11 RX ORDER — MORPHINE SULFATE 2 MG/ML
4 INJECTION, SOLUTION INTRAMUSCULAR; INTRAVENOUS EVERY 4 HOURS PRN
Status: DISPENSED | OUTPATIENT
Start: 2022-07-11 | End: 2022-07-13

## 2022-07-11 RX ORDER — POTASSIUM CHLORIDE 7.45 MG/ML
10 INJECTION INTRAVENOUS
Status: COMPLETED | OUTPATIENT
Start: 2022-07-11 | End: 2022-07-11

## 2022-07-11 RX ORDER — METOPROLOL SUCCINATE 25 MG/1
25 TABLET, EXTENDED RELEASE ORAL DAILY
Status: DISCONTINUED | OUTPATIENT
Start: 2022-07-12 | End: 2022-07-14 | Stop reason: HOSPADM

## 2022-07-11 RX ORDER — LEVOTHYROXINE SODIUM 112 UG/1
TABLET ORAL
COMMUNITY
Start: 2022-06-20

## 2022-07-11 RX ORDER — METOPROLOL SUCCINATE 25 MG/1
25 TABLET, EXTENDED RELEASE ORAL DAILY
Status: DISCONTINUED | OUTPATIENT
Start: 2022-07-11 | End: 2022-07-11

## 2022-07-11 RX ORDER — LIDOCAINE HYDROCHLORIDE AND EPINEPHRINE 10; 10 MG/ML; UG/ML
20 INJECTION, SOLUTION INFILTRATION; PERINEURAL ONCE
Status: COMPLETED | OUTPATIENT
Start: 2022-07-11 | End: 2022-07-11

## 2022-07-11 RX ORDER — MORPHINE SULFATE 2 MG/ML
2 INJECTION, SOLUTION INTRAMUSCULAR; INTRAVENOUS EVERY 4 HOURS PRN
Status: ACTIVE | OUTPATIENT
Start: 2022-07-11 | End: 2022-07-13

## 2022-07-11 RX ADMIN — METOPROLOL SUCCINATE 25 MG: 25 TABLET, EXTENDED RELEASE ORAL at 11:52

## 2022-07-11 RX ADMIN — POTASSIUM CHLORIDE 10 MEQ: 7.46 INJECTION, SOLUTION INTRAVENOUS at 14:44

## 2022-07-11 RX ADMIN — SODIUM CHLORIDE, PRESERVATIVE FREE 10 ML: 5 INJECTION INTRAVENOUS at 20:27

## 2022-07-11 RX ADMIN — VANCOMYCIN HYDROCHLORIDE 1250 MG: 10 INJECTION, POWDER, LYOPHILIZED, FOR SOLUTION INTRAVENOUS at 13:44

## 2022-07-11 RX ADMIN — MORPHINE SULFATE 4 MG: 2 INJECTION, SOLUTION INTRAMUSCULAR; INTRAVENOUS at 14:38

## 2022-07-11 RX ADMIN — HEPARIN SODIUM 5000 UNITS: 5000 INJECTION INTRAVENOUS; SUBCUTANEOUS at 22:12

## 2022-07-11 RX ADMIN — LISINOPRIL 5 MG: 5 TABLET ORAL at 11:52

## 2022-07-11 RX ADMIN — LIDOCAINE HYDROCHLORIDE,EPINEPHRINE BITARTRATE 20 ML: 10; .01 INJECTION, SOLUTION INFILTRATION; PERINEURAL at 16:32

## 2022-07-11 RX ADMIN — CEFEPIME HYDROCHLORIDE 2000 MG: 2 INJECTION, POWDER, FOR SOLUTION INTRAVENOUS at 12:22

## 2022-07-11 RX ADMIN — HYDROMORPHONE HYDROCHLORIDE 1 MG: 1 INJECTION, SOLUTION INTRAMUSCULAR; INTRAVENOUS; SUBCUTANEOUS at 12:54

## 2022-07-11 RX ADMIN — POTASSIUM CHLORIDE 10 MEQ: 7.46 INJECTION, SOLUTION INTRAVENOUS at 15:48

## 2022-07-11 RX ADMIN — POTASSIUM CHLORIDE 10 MEQ: 7.46 INJECTION, SOLUTION INTRAVENOUS at 13:46

## 2022-07-11 RX ADMIN — MAGNESIUM SULFATE HEPTAHYDRATE 2000 MG: 2 INJECTION, SOLUTION INTRAVENOUS at 13:47

## 2022-07-11 RX ADMIN — ROSUVASTATIN CALCIUM 20 MG: 20 TABLET, COATED ORAL at 09:36

## 2022-07-11 RX ADMIN — HEPARIN SODIUM 5000 UNITS: 5000 INJECTION INTRAVENOUS; SUBCUTANEOUS at 06:19

## 2022-07-11 RX ADMIN — CEFEPIME HYDROCHLORIDE 2000 MG: 2 INJECTION, POWDER, FOR SOLUTION INTRAVENOUS at 23:56

## 2022-07-11 RX ADMIN — VANCOMYCIN HYDROCHLORIDE 1250 MG: 10 INJECTION, POWDER, LYOPHILIZED, FOR SOLUTION INTRAVENOUS at 01:26

## 2022-07-11 ASSESSMENT — ENCOUNTER SYMPTOMS
COUGH: 0
SHORTNESS OF BREATH: 0
ABDOMINAL PAIN: 0
DIARRHEA: 1
NAUSEA: 1
VOMITING: 1

## 2022-07-11 ASSESSMENT — PAIN DESCRIPTION - LOCATION
LOCATION: LEG
LOCATION: LEG

## 2022-07-11 ASSESSMENT — PAIN SCALES - GENERAL
PAINLEVEL_OUTOF10: 4
PAINLEVEL_OUTOF10: 2
PAINLEVEL_OUTOF10: 0

## 2022-07-11 ASSESSMENT — PAIN - FUNCTIONAL ASSESSMENT
PAIN_FUNCTIONAL_ASSESSMENT: ACTIVITIES ARE NOT PREVENTED
PAIN_FUNCTIONAL_ASSESSMENT: ACTIVITIES ARE NOT PREVENTED

## 2022-07-11 ASSESSMENT — PAIN DESCRIPTION - DESCRIPTORS
DESCRIPTORS: SORE
DESCRIPTORS: SORE

## 2022-07-11 ASSESSMENT — PAIN DESCRIPTION - FREQUENCY: FREQUENCY: INTERMITTENT

## 2022-07-11 ASSESSMENT — PAIN DESCRIPTION - ORIENTATION
ORIENTATION: LEFT
ORIENTATION: LEFT

## 2022-07-11 ASSESSMENT — PAIN DESCRIPTION - ONSET: ONSET: ON-GOING

## 2022-07-11 ASSESSMENT — PAIN DESCRIPTION - PAIN TYPE
TYPE: ACUTE PAIN
TYPE: ACUTE PAIN

## 2022-07-11 NOTE — CONSULTS
--        Estimated Creatinine Clearance: 121 mL/min (A) (based on SCr of 0.7 mg/dL (L)).     Cultures & Sensitivities:  Date Site Micro Susceptibility / Result   7/11 MRSA nasal PCR sent

## 2022-07-11 NOTE — PROGRESS NOTES
ICU Progress Note    Admit Date: 7/10/2022  Day: 2  Vent Day: None  IV Access:Peripheral  IV Fluids:None  Vasopressors:None                Antibiotics: None  Diet: Diet NPO    CC: L Groin swelling    Interval history: Patient seen and examined at bedside. Wound was examined - border outlines were already drawn to see if swelling is expanding at the hospital. Is tender, warm to touch. Patient has no other complaints at this time. Held Heparin as procedure may be done - on SCDs for PPX. HPI:    69 yo M p/w L groin swelling. He had bilateral femoral grafts, left side was done [facility] in December, 2021 while the right side was done in March, 2022. After the surgery, he had a residual swelling on the left. The left groin swelling is on the incision site and was first noticed about two days ago and has been progressively worsening. There is associated pain and erythema on the site of the swelling. Pain is 8/10, non-radiating. Pt denies fever, any discharge or trauma to the site, LL weakness. He however endorses chills, nausea and diarrhea.       Medications:     Scheduled Meds:   lisinopril  5 mg Oral Daily    metoprolol succinate  25 mg Oral Daily    cefepime  2,000 mg IntraVENous Q12H    rosuvastatin  20 mg Oral Daily    sodium chloride flush  5-40 mL IntraVENous 2 times per day    heparin (porcine)  5,000 Units SubCUTAneous 3 times per day    vancomycin  15 mg/kg (Adjusted) IntraVENous Q12H     Continuous Infusions:   sodium chloride       PRN Meds:morphine, sodium chloride flush, sodium chloride, ondansetron **OR** ondansetron, polyethylene glycol, acetaminophen **OR** acetaminophen    Objective:   Vitals:   T-max:  Patient Vitals for the past 8 hrs:   BP Temp Temp src Pulse Resp SpO2   07/11/22 0759 (!) 147/90 98.2 °F (36.8 °C) Oral 73 18 90 %   07/11/22 0342 (!) 155/78 98.6 °F (37 °C) Oral 73 18 92 %       Intake/Output Summary (Last 24 hours) at 7/11/2022 1018  Last data filed at 7/10/2022 2345  Gross per 24 hour   Intake --   Output 500 ml   Net -500 ml       Review of Systems A 10 point review of systems was conducted, significant findings as noted in HPI. Constitutional:       Appearance: Normal appearance. HENT:      Head: Normocephalic and atraumatic. Mouth/Throat:      Mouth: Mucous membranes are moist.   Eyes:      Pupils: Pupils are equal, round, and reactive to light. Cardiovascular:      Rate and Rhythm: Normal rate and regular rhythm. Heart sounds: Normal heart sounds. Pulmonary:      Effort: Pulmonary effort is normal.      Breath sounds: Normal breath sounds. Abdominal:      General: Bowel sounds are normal.      Palpations: Abdomen is soft. Musculoskeletal:      Right lower leg: No edema. Left lower leg: No edema. Skin:     Findings: Erythema and swelling present. Comments: L tender groin swelling   Neurological:      Mental Status: He is alert and oriented to person, place, and time. LABS:    CBC:   Recent Labs     07/10/22  1120 07/11/22  0544   WBC 8.9 8.5   HGB 14.8 13.5   HCT 44.1 39.9*    207   MCV 92.2 91.4     Renal:    Recent Labs     07/10/22  1120      K 3.9   CL 97*   CO2 28   BUN 18   CREATININE 0.7*   GLUCOSE 188*   CALCIUM 9.4   ANIONGAP 11     Hepatic:   Recent Labs     07/10/22  1120   AST 20   ALT 18   BILITOT 0.7   PROT 7.8   LABALBU 4.1   ALKPHOS 93     Troponin:   Recent Labs     07/10/22  1120   TROPONINI <0.01     BNP: No results for input(s): BNP in the last 72 hours. Lipids: No results for input(s): CHOL, HDL in the last 72 hours. Invalid input(s): LDLCALCU, TRIGLYCERIDE  ABGs:  No results for input(s): PHART, WBV1PLQ, PO2ART, ZJJ0DWH, BEART, THGBART, Z2GFADRU, IQA7ITF in the last 72 hours. INR:   Recent Labs     07/11/22  0544   INR 1.16*     Lactate: No results for input(s): LACTATE in the last 72 hours.   Cultures:  -----------------------------------------------------------------  RAD:   VL DUP LOWER EXTREMITY ARTERIES LEFT   Final Result            Assessment/Plan:   oswald Contreras 77 yo M PMH HTN, HLD, peripheral vascular disease following vascular surgery (Dr Thao Ross), presented to the ED with complaints of left groin swelling. 1. L leg cellulitis and abscess/fluid collection  Left leg swelling and erythema at previous femoral graft surgery incision site. CT L fermur showed femoral fluid collection.   -Started on vancomycin and Cefepime for broad coverage.   -Morphine for pain   -LE Doppler pending   -Vascular Surgery following - I&D  -Wound care consulted  -Blood cultures pending.      2.  HTN   Continue home meds   Code Status: Full Code  FEN: Diet NPO  PPX: SCD  DISPO: Ayesha Poole MD, PGY-1  07/11/22  10:18 AM    This patient has been staffed and discussed with Quan Vincent MD.

## 2022-07-11 NOTE — CARE COORDINATION
Cm following, pt from home ind with wife, plans to DC home denies needs at this time, wife says she can do the dressing change at home that she has done them before. Will follow for DC needs if IV ABX needed vs larger wound care.   Electronically signed by Afshan Espinoza RN on 7/11/2022 at 3:38 PM   [phone]

## 2022-07-11 NOTE — PROGRESS NOTES
Clinical Pharmacy Progress Note    Vancomycin - Management by Pharmacy    Consult Date(s): 7/10/22  Consulting Provider(s): Dr.Ogonna Marsh [facility]    Assessment / Plan     SSTI- Vancomycin  Concurrent Antimicrobials: Cefepime  Day of Vanc Therapy / Ordered Duration: 7  Current Dosing Method: Bayesian-Guided AUC Dosing  Therapeutic Goal: 400-600 mg/L*hr  Current Dose / Frequency: 1250 mg every 12 hours  Plan / Rationale: Estimated AUC of  455mg/L.hr and trough of 14.1mg/L  Will continue to monitor clinical condition and make adjustments to regimen as appropriate. Thank you for consulting Pharmacy! Pedro Cadena, PharmD  Main Pharmacy: [id number]        Subjective/Objective: Mr. Anny Chamorro is a 78 y.o. male admitted for Groin abcess. Pharmacy has been consulted to dose vancmycin. Ht Readings from Last 1 Encounters:   07/10/22 5' 8\" (1.727 m)     Wt Readings from Last 1 Encounters:   07/10/22 229 lb 11.5 oz (104.2 kg)       Current & Prior Antimicrobial Regimen(s):  Cefepime     Level(s) / Doses:    Date Time Dose Level / Type of Level Interpretation                 Note: Serum levels collected for AUC-based dosing may be high if collected in close proximity to the dose administered. This is not necessarily indicative of toxicity. Cultures & Sensitivities:    Date Site Micro Susceptibility / Result                 Labs / Ancillary Data:    Estimated Creatinine Clearance: 121 mL/min (A) (based on SCr of 0.7 mg/dL (L)). Recent Labs     07/10/22  1120   CREATININE 0.7*   BUN 18   WBC 8.9       Additional Lab Values / Findings of Note:    Procalcitonin: No results for input(s): PROCAL in the last 72 hours.

## 2022-07-11 NOTE — PROGRESS NOTES
Pt A&Ox4 SBP elevated but otherwise stable. I&D beside today. Drsg w/ moderate amount of drainage, notified MD and changed again at bedside w/ABD drsg. No new drainage noted. Pt tolerating PO diet and ambulating well. Voiding freely. Started PO lisinopril and Metoprolol XR,check MAR. Started IV ABX and IV electrolytes, tolerating well. Patient in bed with eyes closed wheels are locked and bed is in the lowest position. Call light and personal belongings in reach.      Electronically signed by Gerber Hector RN on 7/11/2022 at 4:31 PM

## 2022-07-11 NOTE — PROGRESS NOTES
Vascular Surgery   Daily Progress Note  Patient: Hugo Edmondson    CC: Left thigh abscess    SUBJECTIVE:  Afebrile and HDS. No acute complaints overnight. Persistent pain in left groin. No fevers or chills, NPO since midnight. ROS: A 14 point review of systems was conducted, significant findings as noted above. All other systems negative. OBJECTIVE:   Infusions:   sodium chloride        I/O:No intake/output data recorded. Wt Readings from Last 1 Encounters:   07/10/22 229 lb 11.5 oz (104.2 kg)       Exam:  BP (!) 155/78   Pulse 73   Temp 98.6 °F (37 °C) (Oral)   Resp 18   Ht 5' 8\" (1.727 m)   Wt 229 lb 11.5 oz (104.2 kg)   SpO2 92%   BMI 34.93 kg/m²     General appearance: alert, no acute distress  Neuro: A&Ox3, no focal deficits, sensation intact  Eyes: No scleral icterus, EOM grossly intact  Neck: trachea midline, no JVD, no lymphadenopathy, neck supple  Chest/Lungs:  normal effort with no accessory muscle use on RA  Cardiovascular: RRR  Abdomen: Soft, non-tender, non-distended, no rebound, guarding, or rigidity. Skin: warm and dry, no rashes  Extremities: Proximal left thigh had a large area of a raised/bulging, erythematous, tender, and warm lesion. Vascular Exam:     carotid  radial  femoral  popliteal  DP  PT    LEFT  N/A + +  +  +  +    RIGHT   N/A +  +  +  +  +                LABS:   Recent Labs     07/10/22  1120   WBC 8.9   HGB 14.8   HCT 44.1   MCV 92.2           Recent Labs     07/10/22  1120      K 3.9   CL 97*   CO2 28   BUN 18   CREATININE 0.7*        Recent Labs     07/10/22  1120   AST 20   ALT 18   BILITOT 0.7   ALKPHOS 93      No results for input(s): LIPASE, AMYLASE in the last 72 hours.      Recent Labs     07/10/22  1120   PROT 7.8        Recent Labs     07/10/22  1120   TROPONINI <0.01       Assessment/Plan:  Hugo Edmondson is a 78 y.o. male with Hx PVD, popliteal aneurysms, and bilateral graft placements (L side done in December 2021; R side in March 2022), with the R thigh graft complicated by wound dehiscence.  Pt is now experiencing increased swelling in the left groin and proximal left thigh.      -Pseudoaneurysm vs an abscess within the proximal Left thight  -NPO since midnight  -Duplex of the LLE today.   -initiate broad spectrum abx's.   -blood cx's collected on 7/10, awaiting results.        Liv Calvo DO   PGY1, General Surgery  07/11/22  6:19 AM  Pager # [phone]

## 2022-07-11 NOTE — PROGRESS NOTES
Vascular Surg  POC    Incision and Drainage Note    Type of Incision and Drainage:     [] Simple    [x] Complex     [] Hematoma/Seroma  [x] Abscess soft tissue deep       After informed consent was obtained,the left thigh was prepped and draped in the usual sterile fashion. 1% lidocaine with epinephrine was injected to the affected area. # 11 scalpel was used to create a 5 cm incision over the area of greatest fluctuance. Approximately 400 mL sanguinopurulent foul smelling fluid was expressed and cultures obtained. Loculations were broken up using blunt dissection. Wound flushed with saline and packed with betadine soaked kirlex. Dressing was applied. Patient tolerated procedure well without any immediate complications.      Roni Hoff MD  07/11/22  1:19 PM  [phone]

## 2022-07-11 NOTE — CONSULTS
Vascular Surgery   Resident Consult Note    Reason for Consult: Left thigh abscess     History of Present Illness:   Jessie Guzmán is a 78 y.o. male with Hx PVD, popliteal aneurysms, and bilateral  graft placements (L side done in December 2021; R side in March 2022), with the R thigh graft complicated by wound dehiscence. Pt is now experiencing increased swelling in the left groin and proximal left thigh. Pt states his current episode began on Wednesday (7/6/10) with the development of his current bulge with pains that were intermittent in nature. Since then, the pain has become continuous in nature. Additionally, he reports fevers and chills since the onset of his symptoms along with growth of the area that is swelling and red. Past Medical History:        Diagnosis Date    CAD (coronary artery disease)     Hyperlipidemia     Hypertension     No longer taking meds    Thyroid disease     No longer taking meds    Wears dentures     upper       Past Surgical History:        Procedure Laterality Date    ARTERIAL BYPASS SURGRY      triple in 2009   [address]  2011    CABG x3 vessel    FEMORAL BYPASS Left 12/29/2021    LEFT FEMORAL POPLITEAL BYPASS performed by Pita Castellanos MD at [address] [facility] Right 3/10/2022    RIGHT BELOW THE KNEE POPLITEAL INSITU BYPASS GRAFT performed by Pita Castellanos MD at [address] [facility] Right 3/15/2022    WOUND IRRIGATION AND CLOSURE RIGHT LEG SURGICAL WOUND performed by Pita Castellanos MD at [address]:  Patient has no known allergies. Medications:   Home Meds  No current facility-administered medications on file prior to encounter.      Current Outpatient Medications on File Prior to Encounter   Medication Sig Dispense Refill    rosuvastatin (CRESTOR) 20 MG tablet Take 1 tablet by mouth daily 90 tablet 1    metoprolol succinate (TOPROL XL) 25 MG extended release tablet Take 1 tablet by mouth daily 90 tablet 0    lisinopril (PRINIVIL;ZESTRIL) 5 MG tablet Take 1 tablet by mouth daily 90 tablet 1    aspirin 81 MG chewable tablet Take 81 mg by mouth daily. Current Meds  cefepime (MAXIPIME) 2000 mg IVPB minibag, Q12H  morphine (PF) injection 4 mg, Q4H PRN  [START ON 7/11/2022] rosuvastatin (CRESTOR) tablet 20 mg, Daily  sodium chloride flush 0.9 % injection 5-40 mL, 2 times per day  sodium chloride flush 0.9 % injection 5-40 mL, PRN  0.9 % sodium chloride infusion, PRN  ondansetron (ZOFRAN-ODT) disintegrating tablet 4 mg, Q8H PRN   Or  ondansetron (ZOFRAN) injection 4 mg, Q6H PRN  polyethylene glycol (GLYCOLAX) packet 17 g, Daily PRN  acetaminophen (TYLENOL) tablet 650 mg, Q6H PRN   Or  acetaminophen (TYLENOL) suppository 650 mg, Q6H PRN  heparin (porcine) injection 5,000 Units, 3 times per day  [START ON 7/11/2022] vancomycin (VANCOCIN) 1,250 mg in dextrose 5 % 250 mL IVPB, Q12H        Family History:   Family History   Problem Relation Age of Onset    Heart Disease Mother     Stroke Father        Social History:   TOBACCO:   reports that he has been smoking cigarettes. He has been smoking about 1.00 pack per day. He has never used smokeless tobacco.  ETOH:   reports no history of alcohol use. DRUGS:   reports previous drug use. Drug: Marijuana Lindquist Shobha). Review of Systems:   A 14 point review of systems was conducted, significant findings as noted in HPI. All other systems negative.      Physical exam:    Vitals:    07/10/22 2100 07/10/22 2137   BP: (!) 155/81    Pulse: 79    Resp: 20    Temp: 99.5 °F (37.5 °C)    TempSrc: Oral    SpO2: 92%    Weight:  229 lb 11.5 oz (104.2 kg)       General appearance: alert, no acute distress  Neuro: A&Ox3, no focal deficits, sensation intact  Eyes: No scleral icterus, EOM grossly intact  Neck: trachea midline, no JVD, no lymphadenopathy, neck supple  Chest/Lungs:  normal effort with no accessory muscle use on RA  Cardiovascular: RRR  Abdomen: Soft, non-tender, non-distended, no rebound, guarding, or rigidity. Skin: warm and dry, no rashes  Extremities: Proximal left thigh had a large area of a raised/bulging, erythematous, tender, and warm lesion. Vascular Exam:    carotid  radial  femoral  popliteal  DP  PT    LEFT  N/A + +  +  +  +    RIGHT   N/A +  +  +  +  +        Labs:    CBC:   Recent Labs     07/10/22  1120   WBC 8.9   HGB 14.8   HCT 44.1   MCV 92.2        BMP:   Recent Labs     07/10/22  1120      K 3.9   CL 97*   CO2 28   BUN 18   CREATININE 0.7*     PT/INR: No results for input(s): PROTIME, INR in the last 72 hours. APTT: No results for input(s): APTT in the last 72 hours. Liver Profile:   Lab Results   Component Value Date/Time    AST 20 07/10/2022 11:20 AM    ALT 18 07/10/2022 11:20 AM    BILIDIR 0.12 09/17/2010 08:12 AM    BILITOT 0.7 07/10/2022 11:20 AM    ALKPHOS 93 07/10/2022 11:20 AM     Lab Results   Component Value Date/Time    CHOL 121 11/18/2021 06:47 AM    HDL 33 11/18/2021 06:47 AM    HDL 33 09/17/2010 08:12 AM    TRIG 100 11/18/2021 06:47 AM     UA: No results found for: NITRITE, COLORU, PHUR, LABCAST, WBCUA, RBCUA, MUCUS, TRICHOMONAS, YEAST, BACTERIA, CLARITYU, SPECGRAV, LEUKOCYTESUR, UROBILINOGEN, BILIRUBINUR, BLOODU, GLUCOSEU, AMORPHOUS    Imaging:   No orders to display         Assessment/Plan:  Addi Chan is a 78 y.o. male with Hx PVD, popliteal aneurysms, and bilateral  graft placements (L side done in December 2021; R side in March 2022), with the R thigh graft complicated by wound dehiscence. Pt is now experiencing increased swelling in the left groin and proximal left thigh.     -Pseudoaneurysm vs an abscess within the proximal Left thight  -Make NPO at midnight  -Duplex of the LLE 7/11/22. -initiate broad spectrum abx's.   -blood cx's collected on 7/10, awaiting results. Rockney Massing, DO   PGY1, General Surgery  07/11/22  5:55 AM  Pager # [phone]    .

## 2022-07-11 NOTE — ED NOTES
Writer attempted to speak with nurse at [facility] RN states he is unable to take report at this time.      Rahat Baird RN  07/10/22 2001

## 2022-07-11 NOTE — PROGRESS NOTES
4 Eyes Admission Assessment     I agree as the admission nurse that 2 RN's have performed a thorough Head to Toe Skin Assessment on the patient. ALL assessment sites listed below have been assessed on admission. Areas assessed by both nurses: Maribell Na  [x]   Head, Face, and Ears   [x]   Shoulders, Back, and Chest  [x]   Arms, Elbows, and Hands   [x]   Coccyx, Sacrum, and Ischium  [x]   Legs, Feet, and Heels     Red, swollen abscess to right thigh from previous graft site. Surgical scars to bilateral lower extremities. Blanchable redness in perineum. Does the Patient have Skin Breakdown?   No         Alfie Prevention initiated:  No   Wound Care Orders initiated:  No      [facility] nurse consulted for Pressure Injury (Stage 3,4, Unstageable, DTI, NWPT, and Complex wounds) or Alfie score 18 or lower:  No      Nurse 1 eSignature: Electronically signed by Jona Perera RN on 7/11/22 at 12:35 AM EDT    **SHARE this note so that the co-signing nurse is able to place an eSignature**    Nurse 2 eSignature: Electronically signed by Maria Esther Mott RN on 7/11/22 at 3:15 AM EDT

## 2022-07-11 NOTE — PROGRESS NOTES
Arterial Duplex this AM revealed no evidence of Left groin pseudoaneurysm, however, it did show a non-vascular complex fluid collection measuring 8.5 x 6.1 x 5.3. This collection would benefit from a bedside drainage and debridement.  Will get informed consent and surgery team will attempt procedure this AM.     Fanta Garvin CNP  7/11/2022  10:23 AM  hiral

## 2022-07-11 NOTE — H&P
Internal Medicine  PGY 1  History & Physical      CC Left groin swelling     History Obtained From:  patient    HISTORY OF PRESENT ILLNESS:  Ian Harley, a 77 yo M PMH HTN, HLD, peripheral vascular disease following vascular surgery (Dr Kenny Mosquera), presented to the ED with complaints of left groin swelling. He had bilateral femoral grafts, left side was done [facility] in December, 2021 while the right side was done in March, 2022. After the surgery, he had a residual swelling on the left which the surgeon had assured him would subside with time. The left groin swelling is on the incision site and was first noticed about two days ago and has been progressively worsening. There is associated pain and erythema on the site of the swelling. Pain is 8/10, non-radiating, no known relieving or aggravating factors. Pt denies fever, any discharge or trauma to the site, LL weakness. He however endorses chills, nausea and diarrhea. In the [facility] ED, CBC and Lactate wnl. Vitals stable except for increased /81. CT femur with contrast reveals an 8.2 cm peripherally enhancing fluid collection in the anterior subcutaneous fat of the proximal left thigh with adjacent subcutaneous fat stranding compatible with abscess and cellulitis no evidence of soft tissue gas.  He was started on antibiotics and transferred to [facility] for further management by vascular team.     Past Medical History:        Diagnosis Date    CAD (coronary artery disease)     Hyperlipidemia     Hypertension     No longer taking meds    Thyroid disease     No longer taking meds    Wears dentures     upper   ·     Past Surgical History:        Procedure Laterality Date    ARTERIAL BYPASS SURGRY      triple in 2009   [address]  2011    CABG x3 vessel    FEMORAL BYPASS Left 12/29/2021    LEFT FEMORAL POPLITEAL BYPASS performed by Carmela Ochoa MD at [address] [place] [address] Right 3/10/2022    RIGHT BELOW THE KNEE POPLITEAL INSITU BYPASS GRAFT performed by Tram Mueller MD at [address] Right 3/15/2022    WOUND IRRIGATION AND CLOSURE RIGHT LEG SURGICAL WOUND performed by Tram Mueller MD at [facility]   ·     Medications Priorto Admission:    · Medications Prior to Admission: rosuvastatin (CRESTOR) 20 MG tablet, Take 1 tablet by mouth daily  · metoprolol succinate (TOPROL XL) 25 MG extended release tablet, Take 1 tablet by mouth daily  · lisinopril (PRINIVIL;ZESTRIL) 5 MG tablet, Take 1 tablet by mouth daily  · aspirin 81 MG chewable tablet, Take 81 mg by mouth daily. Allergies:  Patient has no known allergies. Social History:   · TOBACCO:  Smokes about 1/2 pack a day in the past year. Used to smoke 21/2 packs/day in the past 35 years. He has never used smokeless tobacco.  · ETOH:   reports no history of alcohol use. · DRUGS : Marijuana use occassionally     Family History:       Problem Relation Age of Onset    Heart Disease Mother     Stroke Father    ·     Review of Systems   Constitutional: Positive for chills. Negative for fever. HENT: Negative for congestion. Eyes: Negative for visual disturbance. Respiratory: Negative for cough and shortness of breath. Cardiovascular: Negative for chest pain and palpitations. Gastrointestinal: Positive for diarrhea, nausea and vomiting. Negative for abdominal pain. Genitourinary: Negative for difficulty urinating. Neurological: Negative for dizziness, weakness and headaches. Psychiatric/Behavioral: Negative for behavioral problems. ROS: A 10 point review of systems was conducted, significant findings as noted in HPI. Physical Exam  Constitutional:       Appearance: Normal appearance. HENT:      Head: Normocephalic and atraumatic. Mouth/Throat:      Mouth: Mucous membranes are moist.   Eyes:      Pupils: Pupils are equal, round, and reactive to light. Cardiovascular:      Rate and Rhythm: Normal rate and regular rhythm.       Heart sounds: Normal heart sounds. Pulmonary:      Effort: Pulmonary effort is normal.      Breath sounds: Normal breath sounds. Abdominal:      General: Bowel sounds are normal.      Palpations: Abdomen is soft. Musculoskeletal:      Right lower leg: No edema. Left lower leg: No edema. Skin:     Findings: Erythema and lesion present. Comments: L tender groin swelling   Neurological:      Mental Status: He is alert and oriented to person, place, and time. Physical exam:       Vitals:    07/10/22 2345   BP: (!) 151/81   Pulse: 80   Resp: 18   Temp: 99.2 °F (37.3 °C)   SpO2: 92%       DATA:    Labs:  CBC:   Recent Labs     07/10/22  1120   WBC 8.9   HGB 14.8   HCT 44.1          BMP:   Recent Labs     07/10/22  1120      K 3.9   CL 97*   CO2 28   BUN 18   CREATININE 0.7*   GLUCOSE 188*     LFT's:   Recent Labs     07/10/22  1120   AST 20   ALT 18   BILITOT 0.7   ALKPHOS 93     Troponin:   Recent Labs     07/10/22  1120   TROPONINI <0.01     BNP:No results for input(s): BNP in the last 72 hours. ABGs: No results for input(s): PHART, WLK7CBY, PO2ART in the last 72 hours. INR: No results for input(s): INR in the last 72 hours. U/A:No results for input(s): NITRITE, COLORU, PHUR, LABCAST, WBCUA, RBCUA, MUCUS, TRICHOMONAS, YEAST, BACTERIA, CLARITYU, SPECGRAV, LEUKOCYTESUR, UROBILINOGEN, BILIRUBINUR, BLOODU, GLUCOSEU, AMORPHOUS in the last 72 hours. Invalid input(s): Marigene Safe    No orders to display         ASSESSMENT AND PLAN:  oswald Cuellar 79 yo M PMH HTN, peripheral vascular disease being managed for L leg cellulitis and abscess. 1. L leg cellulitis and abscess  Left leg swelling and erythema at previous femoral graft surgery incision site. CT L fermur showed femoral abscess.   -Started on vancomycin and Cefepime for broad coverage.   -Morphine for pain   -LE Doppler pending   -Vascular Surgery following  -Wound care consulted  -Blood cultures pending.      2. HTN   Continue home meds     Will discuss with attending physician Earl Cisneros MD      Code Status:Full code  FEN: NPO  PPX: SQ Heparin   DISPO: LEIDA Garcia MD  7/11/2022,  1:13 AM

## 2022-07-12 ENCOUNTER — APPOINTMENT (OUTPATIENT)
Dept: GENERAL RADIOLOGY | Age: 67
DRG: 571 | End: 2022-07-12
Attending: INTERNAL MEDICINE
Payer: MEDICARE

## 2022-07-12 ENCOUNTER — ANESTHESIA EVENT (OUTPATIENT)
Dept: OPERATING ROOM | Age: 67
DRG: 571 | End: 2022-07-12
Payer: MEDICARE

## 2022-07-12 LAB
ALBUMIN SERPL-MCNC: 3.3 G/DL (ref 3.4–5)
ANION GAP SERPL CALCULATED.3IONS-SCNC: 7 MMOL/L (ref 3–16)
BASOPHILS ABSOLUTE: 0 K/UL (ref 0–0.2)
BASOPHILS ABSOLUTE: 0 K/UL (ref 0–0.2)
BASOPHILS RELATIVE PERCENT: 0.6 %
BASOPHILS RELATIVE PERCENT: 0.6 %
BUN BLDV-MCNC: 15 MG/DL (ref 7–20)
CALCIUM SERPL-MCNC: 8.5 MG/DL (ref 8.3–10.6)
CHLORIDE BLD-SCNC: 98 MMOL/L (ref 99–110)
CO2: 29 MMOL/L (ref 21–32)
CREAT SERPL-MCNC: 0.7 MG/DL (ref 0.8–1.3)
EOSINOPHILS ABSOLUTE: 0 K/UL (ref 0–0.6)
EOSINOPHILS ABSOLUTE: 0.1 K/UL (ref 0–0.6)
EOSINOPHILS RELATIVE PERCENT: 0.6 %
EOSINOPHILS RELATIVE PERCENT: 0.7 %
GFR AFRICAN AMERICAN: >60
GFR NON-AFRICAN AMERICAN: >60
GLUCOSE BLD-MCNC: 183 MG/DL (ref 70–99)
HCT VFR BLD CALC: 37.9 % (ref 40.5–52.5)
HCT VFR BLD CALC: 38.5 % (ref 40.5–52.5)
HEMOGLOBIN: 12.8 G/DL (ref 13.5–17.5)
HEMOGLOBIN: 12.8 G/DL (ref 13.5–17.5)
LYMPHOCYTES ABSOLUTE: 1.5 K/UL (ref 1–5.1)
LYMPHOCYTES ABSOLUTE: 1.5 K/UL (ref 1–5.1)
LYMPHOCYTES RELATIVE PERCENT: 19.5 %
LYMPHOCYTES RELATIVE PERCENT: 20.1 %
MAGNESIUM: 2.2 MG/DL (ref 1.8–2.4)
MCH RBC QN AUTO: 30.8 PG (ref 26–34)
MCH RBC QN AUTO: 30.9 PG (ref 26–34)
MCHC RBC AUTO-ENTMCNC: 33.3 G/DL (ref 31–36)
MCHC RBC AUTO-ENTMCNC: 33.7 G/DL (ref 31–36)
MCV RBC AUTO: 91.3 FL (ref 80–100)
MCV RBC AUTO: 92.8 FL (ref 80–100)
MONOCYTES ABSOLUTE: 0.8 K/UL (ref 0–1.3)
MONOCYTES ABSOLUTE: 0.8 K/UL (ref 0–1.3)
MONOCYTES RELATIVE PERCENT: 10.6 %
MONOCYTES RELATIVE PERCENT: 10.9 %
MRSA SCREEN RT-PCR: NORMAL
NEUTROPHILS ABSOLUTE: 5.1 K/UL (ref 1.7–7.7)
NEUTROPHILS ABSOLUTE: 5.2 K/UL (ref 1.7–7.7)
NEUTROPHILS RELATIVE PERCENT: 67.8 %
NEUTROPHILS RELATIVE PERCENT: 68.6 %
PDW BLD-RTO: 14.4 % (ref 12.4–15.4)
PDW BLD-RTO: 14.5 % (ref 12.4–15.4)
PHOSPHORUS: 3.5 MG/DL (ref 2.5–4.9)
PLATELET # BLD: 227 K/UL (ref 135–450)
PLATELET # BLD: 230 K/UL (ref 135–450)
PMV BLD AUTO: 7.3 FL (ref 5–10.5)
PMV BLD AUTO: 7.3 FL (ref 5–10.5)
POTASSIUM SERPL-SCNC: 3.9 MMOL/L (ref 3.5–5.1)
RBC # BLD: 4.15 M/UL (ref 4.2–5.9)
RBC # BLD: 4.15 M/UL (ref 4.2–5.9)
SODIUM BLD-SCNC: 134 MMOL/L (ref 136–145)
VANCOMYCIN RANDOM: 20.6 UG/ML
WBC # BLD: 7.5 K/UL (ref 4–11)
WBC # BLD: 7.6 K/UL (ref 4–11)

## 2022-07-12 PROCEDURE — 36415 COLL VENOUS BLD VENIPUNCTURE: CPT

## 2022-07-12 PROCEDURE — 83735 ASSAY OF MAGNESIUM: CPT

## 2022-07-12 PROCEDURE — 2580000003 HC RX 258: Performed by: INTERNAL MEDICINE

## 2022-07-12 PROCEDURE — 1200000000 HC SEMI PRIVATE

## 2022-07-12 PROCEDURE — 6360000002 HC RX W HCPCS: Performed by: INTERNAL MEDICINE

## 2022-07-12 PROCEDURE — 93005 ELECTROCARDIOGRAM TRACING: CPT

## 2022-07-12 PROCEDURE — 2580000003 HC RX 258

## 2022-07-12 PROCEDURE — 71045 X-RAY EXAM CHEST 1 VIEW: CPT

## 2022-07-12 PROCEDURE — 83036 HEMOGLOBIN GLYCOSYLATED A1C: CPT

## 2022-07-12 PROCEDURE — 6370000000 HC RX 637 (ALT 250 FOR IP): Performed by: STUDENT IN AN ORGANIZED HEALTH CARE EDUCATION/TRAINING PROGRAM

## 2022-07-12 PROCEDURE — 80069 RENAL FUNCTION PANEL: CPT

## 2022-07-12 PROCEDURE — 6360000002 HC RX W HCPCS

## 2022-07-12 PROCEDURE — 6370000000 HC RX 637 (ALT 250 FOR IP)

## 2022-07-12 PROCEDURE — 85025 COMPLETE CBC W/AUTO DIFF WBC: CPT

## 2022-07-12 PROCEDURE — 80202 ASSAY OF VANCOMYCIN: CPT

## 2022-07-12 RX ORDER — POTASSIUM CHLORIDE 7.45 MG/ML
10 INJECTION INTRAVENOUS ONCE
Status: COMPLETED | OUTPATIENT
Start: 2022-07-12 | End: 2022-07-12

## 2022-07-12 RX ORDER — MAGNESIUM SULFATE IN WATER 40 MG/ML
2000 INJECTION, SOLUTION INTRAVENOUS ONCE
Status: COMPLETED | OUTPATIENT
Start: 2022-07-12 | End: 2022-07-12

## 2022-07-12 RX ORDER — SODIUM CHLORIDE, SODIUM LACTATE, POTASSIUM CHLORIDE, CALCIUM CHLORIDE 600; 310; 30; 20 MG/100ML; MG/100ML; MG/100ML; MG/100ML
INJECTION, SOLUTION INTRAVENOUS CONTINUOUS
Status: DISCONTINUED | OUTPATIENT
Start: 2022-07-13 | End: 2022-07-14

## 2022-07-12 RX ADMIN — LISINOPRIL 5 MG: 5 TABLET ORAL at 07:46

## 2022-07-12 RX ADMIN — CEFEPIME HYDROCHLORIDE 2000 MG: 2 INJECTION, POWDER, FOR SOLUTION INTRAVENOUS at 23:33

## 2022-07-12 RX ADMIN — HEPARIN SODIUM 5000 UNITS: 5000 INJECTION INTRAVENOUS; SUBCUTANEOUS at 14:53

## 2022-07-12 RX ADMIN — ROSUVASTATIN CALCIUM 20 MG: 20 TABLET, COATED ORAL at 07:47

## 2022-07-12 RX ADMIN — MAGNESIUM SULFATE HEPTAHYDRATE 2000 MG: 2 INJECTION, SOLUTION INTRAVENOUS at 07:55

## 2022-07-12 RX ADMIN — CEFEPIME HYDROCHLORIDE 2000 MG: 2 INJECTION, POWDER, FOR SOLUTION INTRAVENOUS at 12:06

## 2022-07-12 RX ADMIN — HEPARIN SODIUM 5000 UNITS: 5000 INJECTION INTRAVENOUS; SUBCUTANEOUS at 06:41

## 2022-07-12 RX ADMIN — VANCOMYCIN HYDROCHLORIDE 1250 MG: 10 INJECTION, POWDER, LYOPHILIZED, FOR SOLUTION INTRAVENOUS at 03:38

## 2022-07-12 RX ADMIN — METOPROLOL SUCCINATE 25 MG: 25 TABLET, FILM COATED, EXTENDED RELEASE ORAL at 07:47

## 2022-07-12 RX ADMIN — VANCOMYCIN HYDROCHLORIDE 1250 MG: 10 INJECTION, POWDER, LYOPHILIZED, FOR SOLUTION INTRAVENOUS at 14:52

## 2022-07-12 RX ADMIN — SODIUM CHLORIDE, PRESERVATIVE FREE 10 ML: 5 INJECTION INTRAVENOUS at 20:37

## 2022-07-12 RX ADMIN — SODIUM CHLORIDE, POTASSIUM CHLORIDE, SODIUM LACTATE AND CALCIUM CHLORIDE: 600; 310; 30; 20 INJECTION, SOLUTION INTRAVENOUS at 23:31

## 2022-07-12 RX ADMIN — HEPARIN SODIUM 5000 UNITS: 5000 INJECTION INTRAVENOUS; SUBCUTANEOUS at 23:35

## 2022-07-12 RX ADMIN — POTASSIUM CHLORIDE 10 MEQ: 7.46 INJECTION, SOLUTION INTRAVENOUS at 08:03

## 2022-07-12 ASSESSMENT — PAIN - FUNCTIONAL ASSESSMENT: PAIN_FUNCTIONAL_ASSESSMENT: ACTIVITIES ARE NOT PREVENTED

## 2022-07-12 ASSESSMENT — PAIN DESCRIPTION - LOCATION: LOCATION: LEG

## 2022-07-12 ASSESSMENT — PAIN SCALES - GENERAL: PAINLEVEL_OUTOF10: 3

## 2022-07-12 ASSESSMENT — PAIN DESCRIPTION - PAIN TYPE: TYPE: ACUTE PAIN

## 2022-07-12 ASSESSMENT — PAIN DESCRIPTION - DESCRIPTORS: DESCRIPTORS: SORE

## 2022-07-12 ASSESSMENT — PAIN DESCRIPTION - ORIENTATION: ORIENTATION: LEFT

## 2022-07-12 NOTE — PROGRESS NOTES
Clinical Pharmacy Progress Note    Vancomycin - Management by Pharmacy    Consult Date(s):  7/10/22  Consulting Provider(s):  Dr. Darian Barrios / Plan:  1)  Left groin abscess and left thigh celluitis - Vancomycin   Concurrent Antimicrobials: Cefepime    Day of Vanc Therapy / Ordered Duration: 3 of 7   Current Dosing Method: Bayesian-guided AUC  o Therapeutic Goal: AUC = 400-600 mg/L*hr  o Currently on 1250mg IV q12h. Random level this AM = 20.6 mcg/mL (drawn ~40 min after end of 4th dose - represents peak). Using Bayesian-guided kinetics, calculated AUC = 441 with steady state trough = 13.9. Continue same regimen.  o If patient continues to improve clinically, renal function remains stable, and long course of Vancomycin is not planned, may not require repeat levels. Will monitor and order repeat level if clinically indicated.  Will continue to monitor clinical condition and make adjustments to regimen as appropriate. Please call with questions--  Thanks--  Cheri Garcia, PharmD, BCPS, BCGP  [id number] ([facility] [place])   7/12/2022 8:14 AM        Interval update:  Bedside I&D performed yesterday by surgery. Culture in process. Subjective/Objective:   Bri Adan is a 78 y.o. y/o male with a PMHx that includes CAD, HTN, PVD, HLD, and b/l femoral graft placement (left 12/2021, right 3/2022) who is admitted with left groin pain / swelling - being treated for left groin abscess with left thigh cellulitis. Pharmacy is consulted to dose Vancomycin.     Ht Readings from Last 1 Encounters:   07/10/22 5' 8\" (1.727 m)     Wt Readings from Last 1 Encounters:   07/10/22 229 lb 11.5 oz (104.2 kg)       Current and Prior Antimicrobials:  Cefepime 2000mg EI q12h (7/10-current)  Vancomycin - Pharmacy to dose   2000mg IV x1 7/10 14:00   1250mg IV q12h (7/11-current)    Vanc Level(s) / Doses:  Date Time Dose Level / Type of Level Interpretation   7/12 05:50 1250mg q12h Random = 20.6 mcg/mL · Drawn ~40 min after 4th dose finished infusing (represents peak)  · Calculated AUC = 441 with trough = 13.9  · Continue 1250mg q12h          Note: Serum levels collected for AUC-based dosing may be high if collected in close proximity to the dose administered. This is not necessarily an indicator of toxicity. Recent Labs     07/10/22  1120 07/11/22  0544 07/11/22  1020 07/12/22  0550   CREATININE 0.7*  --  0.7* 0.7*   BUN 18  --  14 15   WBC 8.9 8.5  --  7.5       Estimated Creatinine Clearance: 121 mL/min (A) (based on SCr of 0.7 mg/dL (L)).     Cultures & Sensitivities:  Date Site Micro Susceptibility / Result   7/11 MRSA nasal PCR Not detected    7/12 Abscess 2+ GPC, final pending

## 2022-07-12 NOTE — PROGRESS NOTES
Pt A&Ox4 VSS. Free of n/v and pain this shift. Pt voiding freely. ABD dressing on L thigh, no new drainage noted this shift. IV ABX given this shift. Pt tolerating PO diet. Pt in bed with wheels locked and in the lowest position with call light and belongings in reach.      Electronically signed by Petra Blackman RN on 7/12/2022 at 1:31 PM

## 2022-07-12 NOTE — PROGRESS NOTES
Patient A&Ox4. VSS  on RA. Patient reports pain of 3-4/10 during the shift. I&D drsg with small to moderate amount of drainage overnight. Overlying drsg and packing was changed by surgical team at bedside. This RN changed the overlying drsg twice overnight for breakthrough drainage. UAL to restroom. IV abx administered per MAR. No significant events overnight. All care needs addressed with no further needs at this time. Bed is locked and in lowest position. Call light and bedside table within reach. Will continue to monitor per protocol.      Electronically signed by Naty Whitaker RN on 7/12/2022 at 5:29 AM

## 2022-07-12 NOTE — PROGRESS NOTES
Vascular Surgery   Daily Progress Note  Patient: Tisha Hill    CC: Left thigh abscess    SUBJECTIVE:  Afebrile and HDS. No acute complaints overnight. Tolerating pain with PRN meds. OOB ambulating. Tolerating diet. Overall feeling better this am, but feeling some pain this morning, increased range of motion, decreased edema. ROS: A 14 point review of systems was conducted, significant findings as noted above. All other systems negative. OBJECTIVE:   Infusions:   sodium chloride        I/O:I/O last 3 completed shifts:  In: -   Out: 500 [Urine:500]           Wt Readings from Last 1 Encounters:   07/10/22 229 lb 11.5 oz (104.2 kg)       Exam:  /74   Pulse 65   Temp 98.1 °F (36.7 °C) (Oral)   Resp 16   Ht 5' 8\" (1.727 m)   Wt 229 lb 11.5 oz (104.2 kg)   SpO2 91%   BMI 34.93 kg/m²     General appearance: alert, no acute distress  Neuro: A&Ox3, no focal deficits, sensation intact  Eyes: No scleral icterus, EOM grossly intact  Neck: trachea midline, no JVD, no lymphadenopathy, neck supple  Chest/Lungs:  normal effort with no accessory muscle use on RA  Cardiovascular: RRR  Abdomen: Soft, non-tender, non-distended, no rebound, guarding, or rigidity. Skin: warm and dry, no rashes  Extremities: Proximal left thigh incision open, betadine kerlex packing, removed hemostatic, erythema within demarcation, induration present, no further purulence.  Tender   Vascular Exam:     carotid  radial  femoral  popliteal  DP  PT    LEFT  N/A + +  +  +  +    RIGHT   N/A +  +  +  +  +                LABS:   Recent Labs     07/10/22  1120 07/11/22  0544   WBC 8.9 8.5   HGB 14.8 13.5   HCT 44.1 39.9*   MCV 92.2 91.4    207        Recent Labs     07/10/22  1120 07/11/22  1020    133*   K 3.9 3.7   CL 97* 98*   CO2 28 26   PHOS  --  3.0   BUN 18 14   CREATININE 0.7* 0.7*        Recent Labs     07/10/22  1120   AST 20   ALT 18   BILITOT 0.7   ALKPHOS 93      No results for input(s): LIPASE, AMYLASE in the last 72 hours. Recent Labs     07/10/22  1120 07/11/22  0544   PROT 7.8  --    INR  --  1.16*        Recent Labs     07/10/22  1120   TROPONINI <0.01       Assessment/Plan:  Xin Marroquin is a 78 y.o. male with Hx PVD, popliteal aneurysms, and bilateral graft placements (L side done in December 2021; R side in March 2022), with the R thigh graft complicated by wound dehiscence. Pt was experiencing increased swelling in the left groin and proximal left thigh. Now s/p I&D for abscess on 7/11/22      - Duplex LLE on 7/11/22 revealing abscess. - Performed I&D 7/11/22.  F/u cultures  - Continue broad spectrum abx's  -Cont BID dressing changes with half diluted betadine packing  -Will discuss with Dr. Frances Iglesias DO   PGY1, General Surgery  07/12/22  6:23 AM  Pager # [phone]

## 2022-07-12 NOTE — PROGRESS NOTES
ICU Progress Note    Admit Date: 7/10/2022  Day: 3  Vent Day: None  IV Access:Peripheral  IV Fluids:None  Vasopressors:None                Antibiotics: None  Diet: ADULT DIET; Regular    CC: L Groin swelling    Interval history: Patient seen and examined at bedside. Plan for add-on washout and wound vac     HPI:    71 yo M p/w L groin swelling. He had bilateral femoral grafts, left side was done [facility] in December, 2021 while the right side was done in March, 2022. After the surgery, he had a residual swelling on the left. The left groin swelling is on the incision site and was first noticed about two days ago and has been progressively worsening. There is associated pain and erythema on the site of the swelling. Pain is 8/10, non-radiating. Pt denies fever, any discharge or trauma to the site, LL weakness. He however endorses chills, nausea and diarrhea. Medications:     Scheduled Meds:   lisinopril  5 mg Oral Daily    metoprolol succinate  25 mg Oral Daily    cefepime  2,000 mg IntraVENous Q12H    rosuvastatin  20 mg Oral Daily    sodium chloride flush  5-40 mL IntraVENous 2 times per day    heparin (porcine)  5,000 Units SubCUTAneous 3 times per day    vancomycin  15 mg/kg (Adjusted) IntraVENous Q12H     Continuous Infusions:   sodium chloride       PRN Meds:morphine **OR** morphine, sodium chloride flush, sodium chloride, ondansetron **OR** ondansetron, polyethylene glycol, acetaminophen **OR** acetaminophen    Objective:   Vitals:   T-max:  Patient Vitals for the past 8 hrs:   BP Temp Temp src Pulse Resp SpO2   07/12/22 0739 132/75 98.3 °F (36.8 °C) Oral 63 17 91 %   07/12/22 0330 127/74 98.1 °F (36.7 °C) Oral 65 16 91 %     No intake or output data in the 24 hours ending 07/12/22 1016    Review of Systems   All other systems reviewed and are negative. A 10 point review of systems was conducted, significant findings as noted in HPI. Constitutional:       Appearance: Normal appearance. HENT:      Head: Normocephalic and atraumatic. Mouth/Throat:      Mouth: Mucous membranes are moist.   Eyes:      Pupils: Pupils are equal, round, and reactive to light. Cardiovascular:      Rate and Rhythm: Normal rate and regular rhythm. Heart sounds: Normal heart sounds. Pulmonary:      Effort: Pulmonary effort is normal.      Breath sounds: Normal breath sounds. Abdominal:      General: Bowel sounds are normal.      Palpations: Abdomen is soft. Musculoskeletal:      Right lower leg: No edema. Left lower leg: No edema. Skin:     Findings: Erythema and swelling present. Comments: L tender groin swelling   Neurological:      Mental Status: He is alert and oriented to person, place, and time. LABS:    CBC:   Recent Labs     07/10/22  1120 07/11/22  0544 07/12/22  0550   WBC 8.9 8.5 7.5   HGB 14.8 13.5 12.8*   HCT 44.1 39.9* 37.9*    207 227   MCV 92.2 91.4 91.3     Renal:    Recent Labs     07/10/22  1120 07/11/22  1020 07/12/22  0550    133* 134*   K 3.9 3.7 3.9   CL 97* 98* 98*   CO2 28 26 29   BUN 18 14 15   CREATININE 0.7* 0.7* 0.7*   GLUCOSE 188* 172* 183*   CALCIUM 9.4 8.8 8.5   MG  --  1.90 2.20   PHOS  --  3.0 3.5   ANIONGAP 11 9 7     Hepatic:   Recent Labs     07/10/22  1120 07/11/22  1020 07/12/22  0550   AST 20  --   --    ALT 18  --   --    BILITOT 0.7  --   --    PROT 7.8  --   --    LABALBU 4.1 3.6 3.3*   ALKPHOS 93  --   --      Troponin:   Recent Labs     07/10/22  1120   TROPONINI <0.01     BNP: No results for input(s): BNP in the last 72 hours. Lipids: No results for input(s): CHOL, HDL in the last 72 hours. Invalid input(s): LDLCALCU, TRIGLYCERIDE  ABGs:  No results for input(s): PHART, KHS2IIZ, PO2ART, IXF8LUX, BEART, THGBART, A8DXFIJD, ZHK4FLW in the last 72 hours.     INR:   Recent Labs     07/11/22  0544   INR 1.16*     Lactate: No results for input(s): LACTATE in the last 72 hours.  Cultures:  -----------------------------------------------------------------  RAD:   VL DUP LOWER EXTREMITY ARTERIES LEFT   Final Result            Assessment/Plan:   Arlette Dan, a 79 yo M PMH HTN, HLD, peripheral vascular disease following vascular surgery (Dr Nettie Soulier), presented to the ED with complaints of left groin swelling. 1. L leg cellulitis and abscess/fluid collection  Left leg swelling and erythema at previous femoral graft surgery incision site. CT L fermur showed femoral fluid collection.   -Started on vancomycin and Cefepime for broad coverage.   -Morphine for pain   -LE Doppler pending   -Vascular Surgery following - I&D  -Wound care consulted  -Blood cultures pending.      2. HTN   Continue home meds   Code Status: Full Code  FEN: ADULT DIET;  Regular  PPX: SCD  DISPO: Kristina Carter MD, PGY-1  07/12/22  10:16 AM    This patient has been staffed and discussed with Rylee Clarke MD.

## 2022-07-12 NOTE — CARE COORDINATION
Cm following, Surgery plans OR Wednesday for I&D with washout. Will follow for DC needs post-op.   Electronically signed by Javier Barker RN on 7/12/2022 at 5:35 PM   [phone]

## 2022-07-12 NOTE — PROGRESS NOTES
PRE-OP NOTE  Department of Surgery      Chief Complaint or Reason for Surgery: L thigh abscess    Procedure: L thigh washout and wound vac placement  Expected time: add-on    Plan  1. Diet: NPO at midnight  2. IVF: [email or web address]  3. Antibiotics: Cefepime, Vancomycin  4. Labs to be drawn: Type and Screen, INR, CBC, renal function panel, Mag  5. Anesthesia: to see patient  6. Consent: will obtain and place in chart  7. Pulmonary: CXR: will order and review  8.  Cardiac: EKG: will order and review      Sara Rivas DO  07/12/22  10:34 AM

## 2022-07-13 ENCOUNTER — ANESTHESIA (OUTPATIENT)
Dept: OPERATING ROOM | Age: 67
DRG: 571 | End: 2022-07-13
Payer: MEDICARE

## 2022-07-13 PROBLEM — A49.1 GROUP B STREPTOCOCCAL INFECTION: Status: ACTIVE | Noted: 2022-07-13

## 2022-07-13 LAB
ABO/RH: NORMAL
ALBUMIN SERPL-MCNC: 3.4 G/DL (ref 3.4–5)
ANION GAP SERPL CALCULATED.3IONS-SCNC: 7 MMOL/L (ref 3–16)
ANTIBODY SCREEN: NORMAL
BASOPHILS ABSOLUTE: 0 K/UL (ref 0–0.2)
BASOPHILS RELATIVE PERCENT: 0.6 %
BUN BLDV-MCNC: 14 MG/DL (ref 7–20)
CALCIUM SERPL-MCNC: 8.5 MG/DL (ref 8.3–10.6)
CHLORIDE BLD-SCNC: 99 MMOL/L (ref 99–110)
CO2: 30 MMOL/L (ref 21–32)
CREAT SERPL-MCNC: 0.7 MG/DL (ref 0.8–1.3)
EOSINOPHILS ABSOLUTE: 0.1 K/UL (ref 0–0.6)
EOSINOPHILS RELATIVE PERCENT: 0.8 %
ESTIMATED AVERAGE GLUCOSE: 182.9 MG/DL
GFR AFRICAN AMERICAN: >60
GFR NON-AFRICAN AMERICAN: >60
GLUCOSE BLD-MCNC: 161 MG/DL (ref 70–99)
GRAM STAIN RESULT: ABNORMAL
HBA1C MFR BLD: 8 %
HCT VFR BLD CALC: 37.9 % (ref 40.5–52.5)
HEMOGLOBIN: 12.5 G/DL (ref 13.5–17.5)
INR BLD: 1.11 (ref 0.87–1.14)
LYMPHOCYTES ABSOLUTE: 1.6 K/UL (ref 1–5.1)
LYMPHOCYTES RELATIVE PERCENT: 22.3 %
MAGNESIUM: 2.1 MG/DL (ref 1.8–2.4)
MCH RBC QN AUTO: 30.5 PG (ref 26–34)
MCHC RBC AUTO-ENTMCNC: 32.9 G/DL (ref 31–36)
MCV RBC AUTO: 92.6 FL (ref 80–100)
MONOCYTES ABSOLUTE: 0.8 K/UL (ref 0–1.3)
MONOCYTES RELATIVE PERCENT: 11.7 %
NEUTROPHILS ABSOLUTE: 4.7 K/UL (ref 1.7–7.7)
NEUTROPHILS RELATIVE PERCENT: 64.6 %
ORGANISM: ABNORMAL
PDW BLD-RTO: 14.1 % (ref 12.4–15.4)
PHOSPHORUS: 3.5 MG/DL (ref 2.5–4.9)
PLATELET # BLD: 272 K/UL (ref 135–450)
PMV BLD AUTO: 7.3 FL (ref 5–10.5)
POTASSIUM SERPL-SCNC: 4.3 MMOL/L (ref 3.5–5.1)
PROTHROMBIN TIME: 14.3 SEC (ref 11.7–14.5)
RBC # BLD: 4.09 M/UL (ref 4.2–5.9)
SODIUM BLD-SCNC: 136 MMOL/L (ref 136–145)
WBC # BLD: 7.2 K/UL (ref 4–11)
WOUND/ABSCESS: ABNORMAL

## 2022-07-13 PROCEDURE — 2709999900 HC NON-CHARGEABLE SUPPLY: Performed by: SURGERY

## 2022-07-13 PROCEDURE — 2580000003 HC RX 258: Performed by: STUDENT IN AN ORGANIZED HEALTH CARE EDUCATION/TRAINING PROGRAM

## 2022-07-13 PROCEDURE — 6360000002 HC RX W HCPCS: Performed by: STUDENT IN AN ORGANIZED HEALTH CARE EDUCATION/TRAINING PROGRAM

## 2022-07-13 PROCEDURE — 7100000001 HC PACU RECOVERY - ADDTL 15 MIN: Performed by: SURGERY

## 2022-07-13 PROCEDURE — 36415 COLL VENOUS BLD VENIPUNCTURE: CPT

## 2022-07-13 PROCEDURE — 99223 1ST HOSP IP/OBS HIGH 75: CPT | Performed by: INTERNAL MEDICINE

## 2022-07-13 PROCEDURE — 3700000001 HC ADD 15 MINUTES (ANESTHESIA): Performed by: SURGERY

## 2022-07-13 PROCEDURE — 3600000004 HC SURGERY LEVEL 4 BASE: Performed by: SURGERY

## 2022-07-13 PROCEDURE — 3700000000 HC ANESTHESIA ATTENDED CARE: Performed by: SURGERY

## 2022-07-13 PROCEDURE — 85025 COMPLETE CBC W/AUTO DIFF WBC: CPT

## 2022-07-13 PROCEDURE — 1200000000 HC SEMI PRIVATE

## 2022-07-13 PROCEDURE — 86900 BLOOD TYPING SEROLOGIC ABO: CPT

## 2022-07-13 PROCEDURE — 3600000014 HC SURGERY LEVEL 4 ADDTL 15MIN: Performed by: SURGERY

## 2022-07-13 PROCEDURE — 97605 NEG PRS WND THER DME<=50SQCM: CPT | Performed by: SURGERY

## 2022-07-13 PROCEDURE — 83735 ASSAY OF MAGNESIUM: CPT

## 2022-07-13 PROCEDURE — 6370000000 HC RX 637 (ALT 250 FOR IP)

## 2022-07-13 PROCEDURE — 86901 BLOOD TYPING SEROLOGIC RH(D): CPT

## 2022-07-13 PROCEDURE — 2500000003 HC RX 250 WO HCPCS: Performed by: NURSE ANESTHETIST, CERTIFIED REGISTERED

## 2022-07-13 PROCEDURE — 6370000000 HC RX 637 (ALT 250 FOR IP): Performed by: STUDENT IN AN ORGANIZED HEALTH CARE EDUCATION/TRAINING PROGRAM

## 2022-07-13 PROCEDURE — 6360000002 HC RX W HCPCS: Performed by: SURGERY

## 2022-07-13 PROCEDURE — 7100000000 HC PACU RECOVERY - FIRST 15 MIN: Performed by: SURGERY

## 2022-07-13 PROCEDURE — 2580000003 HC RX 258

## 2022-07-13 PROCEDURE — 11042 DBRDMT SUBQ TIS 1ST 20SQCM/<: CPT | Performed by: SURGERY

## 2022-07-13 PROCEDURE — 2580000003 HC RX 258: Performed by: SURGERY

## 2022-07-13 PROCEDURE — 6360000002 HC RX W HCPCS

## 2022-07-13 PROCEDURE — 11045 DBRDMT SUBQ TISS EACH ADDL: CPT | Performed by: SURGERY

## 2022-07-13 PROCEDURE — 2500000003 HC RX 250 WO HCPCS: Performed by: ANESTHESIOLOGY

## 2022-07-13 PROCEDURE — 85610 PROTHROMBIN TIME: CPT

## 2022-07-13 PROCEDURE — 0JBM0ZZ EXCISION OF LEFT UPPER LEG SUBCUTANEOUS TISSUE AND FASCIA, OPEN APPROACH: ICD-10-PCS | Performed by: SURGERY

## 2022-07-13 PROCEDURE — 6360000002 HC RX W HCPCS: Performed by: ANESTHESIOLOGY

## 2022-07-13 PROCEDURE — A4217 STERILE WATER/SALINE, 500 ML: HCPCS | Performed by: SURGERY

## 2022-07-13 PROCEDURE — 86850 RBC ANTIBODY SCREEN: CPT

## 2022-07-13 PROCEDURE — 80069 RENAL FUNCTION PANEL: CPT

## 2022-07-13 PROCEDURE — 6360000002 HC RX W HCPCS: Performed by: NURSE ANESTHETIST, CERTIFIED REGISTERED

## 2022-07-13 RX ORDER — LABETALOL HYDROCHLORIDE 5 MG/ML
10 INJECTION, SOLUTION INTRAVENOUS
Status: DISCONTINUED | OUTPATIENT
Start: 2022-07-13 | End: 2022-07-13

## 2022-07-13 RX ORDER — LIDOCAINE HYDROCHLORIDE 20 MG/ML
INJECTION, SOLUTION EPIDURAL; INFILTRATION; INTRACAUDAL; PERINEURAL PRN
Status: DISCONTINUED | OUTPATIENT
Start: 2022-07-13 | End: 2022-07-13 | Stop reason: SDUPTHER

## 2022-07-13 RX ORDER — ONDANSETRON 2 MG/ML
INJECTION INTRAMUSCULAR; INTRAVENOUS PRN
Status: DISCONTINUED | OUTPATIENT
Start: 2022-07-13 | End: 2022-07-13 | Stop reason: SDUPTHER

## 2022-07-13 RX ORDER — FENTANYL CITRATE 50 UG/ML
INJECTION, SOLUTION INTRAMUSCULAR; INTRAVENOUS PRN
Status: DISCONTINUED | OUTPATIENT
Start: 2022-07-13 | End: 2022-07-13 | Stop reason: SDUPTHER

## 2022-07-13 RX ORDER — SODIUM CHLORIDE 9 MG/ML
25 INJECTION, SOLUTION INTRAVENOUS PRN
Status: DISCONTINUED | OUTPATIENT
Start: 2022-07-13 | End: 2022-07-13

## 2022-07-13 RX ORDER — SODIUM CHLORIDE 0.9 % (FLUSH) 0.9 %
5-40 SYRINGE (ML) INJECTION EVERY 12 HOURS SCHEDULED
Status: DISCONTINUED | OUTPATIENT
Start: 2022-07-13 | End: 2022-07-13

## 2022-07-13 RX ORDER — FENTANYL CITRATE 50 UG/ML
50 INJECTION, SOLUTION INTRAMUSCULAR; INTRAVENOUS EVERY 5 MIN PRN
Status: DISCONTINUED | OUTPATIENT
Start: 2022-07-13 | End: 2022-07-13

## 2022-07-13 RX ORDER — GLYCOPYRROLATE 1 MG/5 ML
SYRINGE (ML) INTRAVENOUS PRN
Status: DISCONTINUED | OUTPATIENT
Start: 2022-07-13 | End: 2022-07-13 | Stop reason: SDUPTHER

## 2022-07-13 RX ORDER — PROPOFOL 10 MG/ML
INJECTION, EMULSION INTRAVENOUS PRN
Status: DISCONTINUED | OUTPATIENT
Start: 2022-07-13 | End: 2022-07-13 | Stop reason: SDUPTHER

## 2022-07-13 RX ORDER — SODIUM CHLORIDE 0.9 % (FLUSH) 0.9 %
5-40 SYRINGE (ML) INJECTION PRN
Status: DISCONTINUED | OUTPATIENT
Start: 2022-07-13 | End: 2022-07-13

## 2022-07-13 RX ORDER — OXYCODONE HYDROCHLORIDE 5 MG/1
5 TABLET ORAL PRN
Status: DISCONTINUED | OUTPATIENT
Start: 2022-07-13 | End: 2022-07-13

## 2022-07-13 RX ORDER — MIDAZOLAM HYDROCHLORIDE 1 MG/ML
2 INJECTION INTRAMUSCULAR; INTRAVENOUS
Status: DISCONTINUED | OUTPATIENT
Start: 2022-07-13 | End: 2022-07-13

## 2022-07-13 RX ORDER — OXYCODONE HYDROCHLORIDE 5 MG/1
5 TABLET ORAL EVERY 4 HOURS PRN
Status: DISCONTINUED | OUTPATIENT
Start: 2022-07-13 | End: 2022-07-14 | Stop reason: HOSPADM

## 2022-07-13 RX ORDER — IPRATROPIUM BROMIDE AND ALBUTEROL SULFATE 2.5; .5 MG/3ML; MG/3ML
1 SOLUTION RESPIRATORY (INHALATION)
Status: DISCONTINUED | OUTPATIENT
Start: 2022-07-13 | End: 2022-07-13

## 2022-07-13 RX ORDER — ONDANSETRON 2 MG/ML
4 INJECTION INTRAMUSCULAR; INTRAVENOUS
Status: DISCONTINUED | OUTPATIENT
Start: 2022-07-13 | End: 2022-07-13

## 2022-07-13 RX ORDER — OXYCODONE HYDROCHLORIDE 5 MG/1
10 TABLET ORAL PRN
Status: DISCONTINUED | OUTPATIENT
Start: 2022-07-13 | End: 2022-07-13

## 2022-07-13 RX ORDER — METOCLOPRAMIDE HYDROCHLORIDE 5 MG/ML
10 INJECTION INTRAMUSCULAR; INTRAVENOUS
Status: DISCONTINUED | OUTPATIENT
Start: 2022-07-13 | End: 2022-07-13

## 2022-07-13 RX ADMIN — LIDOCAINE HYDROCHLORIDE 100 MG: 20 INJECTION, SOLUTION EPIDURAL; INFILTRATION; INTRACAUDAL; PERINEURAL at 11:15

## 2022-07-13 RX ADMIN — ONDANSETRON 4 MG: 2 INJECTION INTRAMUSCULAR; INTRAVENOUS at 11:40

## 2022-07-13 RX ADMIN — SODIUM CHLORIDE, POTASSIUM CHLORIDE, SODIUM LACTATE AND CALCIUM CHLORIDE: 600; 310; 30; 20 INJECTION, SOLUTION INTRAVENOUS at 12:25

## 2022-07-13 RX ADMIN — HEPARIN SODIUM 5000 UNITS: 5000 INJECTION INTRAVENOUS; SUBCUTANEOUS at 06:20

## 2022-07-13 RX ADMIN — HYDROMORPHONE HYDROCHLORIDE 0.25 MG: 1 INJECTION, SOLUTION INTRAMUSCULAR; INTRAVENOUS; SUBCUTANEOUS at 12:28

## 2022-07-13 RX ADMIN — FENTANYL CITRATE 50 MCG: 50 INJECTION, SOLUTION INTRAMUSCULAR; INTRAVENOUS at 11:24

## 2022-07-13 RX ADMIN — HYDROMORPHONE HYDROCHLORIDE 0.25 MG: 1 INJECTION, SOLUTION INTRAMUSCULAR; INTRAVENOUS; SUBCUTANEOUS at 12:33

## 2022-07-13 RX ADMIN — ACETAMINOPHEN 325MG 650 MG: 325 TABLET ORAL at 09:48

## 2022-07-13 RX ADMIN — HEPARIN SODIUM 5000 UNITS: 5000 INJECTION INTRAVENOUS; SUBCUTANEOUS at 23:28

## 2022-07-13 RX ADMIN — METOPROLOL SUCCINATE 25 MG: 25 TABLET, FILM COATED, EXTENDED RELEASE ORAL at 12:22

## 2022-07-13 RX ADMIN — ROSUVASTATIN CALCIUM 20 MG: 20 TABLET, COATED ORAL at 09:48

## 2022-07-13 RX ADMIN — SODIUM CHLORIDE 3000 MG: 900 INJECTION INTRAVENOUS at 20:40

## 2022-07-13 RX ADMIN — PROPOFOL 200 MG: 10 INJECTION, EMULSION INTRAVENOUS at 11:15

## 2022-07-13 RX ADMIN — HEPARIN SODIUM 5000 UNITS: 5000 INJECTION INTRAVENOUS; SUBCUTANEOUS at 16:00

## 2022-07-13 RX ADMIN — FENTANYL CITRATE 50 MCG: 50 INJECTION, SOLUTION INTRAMUSCULAR; INTRAVENOUS at 11:30

## 2022-07-13 RX ADMIN — Medication 0.2 MG: at 11:44

## 2022-07-13 RX ADMIN — SODIUM CHLORIDE 3000 MG: 900 INJECTION INTRAVENOUS at 15:50

## 2022-07-13 RX ADMIN — VANCOMYCIN HYDROCHLORIDE 1250 MG: 10 INJECTION, POWDER, LYOPHILIZED, FOR SOLUTION INTRAVENOUS at 01:49

## 2022-07-13 RX ADMIN — LISINOPRIL 5 MG: 5 TABLET ORAL at 09:48

## 2022-07-13 RX ADMIN — PROPOFOL 50 MG: 10 INJECTION, EMULSION INTRAVENOUS at 11:28

## 2022-07-13 ASSESSMENT — PAIN DESCRIPTION - DESCRIPTORS
DESCRIPTORS: SHARP
DESCRIPTORS: ACHING
DESCRIPTORS: SHARP

## 2022-07-13 ASSESSMENT — PAIN SCALES - GENERAL
PAINLEVEL_OUTOF10: 5
PAINLEVEL_OUTOF10: 5
PAINLEVEL_OUTOF10: 1
PAINLEVEL_OUTOF10: 3
PAINLEVEL_OUTOF10: 1
PAINLEVEL_OUTOF10: 0
PAINLEVEL_OUTOF10: 5
PAINLEVEL_OUTOF10: 0

## 2022-07-13 ASSESSMENT — PAIN DESCRIPTION - LOCATION
LOCATION: LEG
LOCATION: BACK
LOCATION: BACK

## 2022-07-13 ASSESSMENT — PAIN DESCRIPTION - PAIN TYPE: TYPE: ACUTE PAIN

## 2022-07-13 ASSESSMENT — PAIN DESCRIPTION - ORIENTATION
ORIENTATION: LOWER
ORIENTATION: LOWER
ORIENTATION: LEFT

## 2022-07-13 ASSESSMENT — PAIN DESCRIPTION - FREQUENCY: FREQUENCY: CONTINUOUS

## 2022-07-13 ASSESSMENT — LIFESTYLE VARIABLES: SMOKING_STATUS: 1

## 2022-07-13 ASSESSMENT — PAIN DESCRIPTION - ONSET: ONSET: GRADUAL

## 2022-07-13 NOTE — BRIEF OP NOTE
Brief Postoperative Note      Patient: Mamie Ruiz  YOB: 1955  MRN: [id number]    Date of Procedure: 7/13/2022    Pre-Op Diagnosis: Abscess of left thigh [U14.571]    Post-Op Diagnosis: Same       Procedure(s):  LEFT THIGH ABSCESS WASHOUT WITH WOUND VAC PLACEMENT    Surgeon(s):  Orquidea Stearns MD    Assistant:  Resident: Francia Aldana DO    Anesthesia: Monitor Anesthesia Care    Estimated Blood Loss (mL): Minimal    Complications: None    Specimens:   * No specimens in log *    Implants:  * No implants in log *      Drains: * No LDAs found *    Findings:   Final measurements of wound after debridement was 9cm in length by 3.5cm in width, by 3cm in depth. Graft was able to be palpated but was not visualized and was adequately covered with tissue. Wound vac placement with white foam at the base for graft protection and black foam on top.      [facility] placed to continuous suction at [address]    Electronically signed by Francia Aldana DO on 7/13/2022 at 11:45 AM

## 2022-07-13 NOTE — CONSULTS
Infectious Diseases Inpatient Consult Note    RESIDENT NOTE - reviewed / edited, attending note at bottom    Reason for Consult:   L groin abscess + cellulitis   Requesting Physician:   Melania Calloway   Primary Care Physician:  MARCEL Saab - CNP  History Obtained From:   Pt, EPIC    Admit Date: 7/10/2022  Hospital Day: 4    CHIEF COMPLAINT:     Left groin swelling     HISTORY OF PRESENT ILLNESS:      Ian Harley, oswald 79 yo M PMH HTN, HLD, peripheral vascular disease following vascular surgery (Dr Kenny Mosquera), presented to the ED with complaints of left groin swelling.      He had bilateral femoral grafts, left side was done [facility] in December, 2021 while the right side was done in March, 2022. After the surgery, he had a residual swelling on the left which the surgeon had assured him would subside with time. The left groin swelling is on the incision site and was first noticed about two days ago and has been progressively worsening. There is associated pain and erythema on the site of the swelling. Pain is 8/10, non-radiating, no known relieving or aggravating factors. Pt denies fever, any discharge or trauma to the site, LL weakness. He however endorses chills, nausea and diarrhea.     In the [facility] ED, CBC and Lactate wnl. Vitals stable except for increased /81. CT femur with contrast reveals an 8.2 cm peripherally enhancing fluid collection in the anterior subcutaneous fat of the proximal left thigh with adjacent subcutaneous fat stranding compatible with abscess and cellulitis no evidence of soft tissue gas.  He was started on antibiotics and transferred to [facility] for further management by vascular team.    Since admission in [facility],   -started on Vanc and Cefepime 7/10 - d/c 7/13  -Duplex LLE 7/11 showed abscess   -I&D was done (7/11) and abt 400 ml sanguinopurulent foul smelling fluid was expressed  -Wound culture (7/11) - Strep Agalactiae  -L thigh washout and wound vac placement ((7/13)  -IV Unasyn 3g 6hrly started (7/13)     Past Medical History:    Past Medical History:   Diagnosis Date    CAD (coronary artery disease)     Hyperlipidemia     Hypertension     No longer taking meds    Thyroid disease     No longer taking meds    Wears dentures     upper       Past Surgical History:    Past Surgical History:   Procedure Laterality Date    ARTERIAL BYPASS SURGRY      triple in 2009   [address]  2011    CABG x3 vessel    FEMORAL BYPASS Left 12/29/2021    LEFT FEMORAL POPLITEAL BYPASS performed by Jenny Villegas MD at [facility] Right 3/10/2022    RIGHT BELOW THE KNEE POPLITEAL INSITU BYPASS GRAFT performed by Jenny Villegas MD at [place] [id number] Left 7/13/2022    LEFT THIGH ABSCESS WASHOUT WITH WOUND VAC PLACEMENT performed by Jordy Rao MD at [address] [place] [address] Right 3/15/2022    WOUND IRRIGATION AND CLOSURE RIGHT LEG SURGICAL WOUND performed by Jenny Villegas MD at [place] [address]       Current Medications:     ampicillin-sulbactam  3,000 mg IntraVENous Q6H    lisinopril  5 mg Oral Daily    metoprolol succinate  25 mg Oral Daily    rosuvastatin  20 mg Oral Daily    sodium chloride flush  5-40 mL IntraVENous 2 times per day    heparin (porcine)  5,000 Units SubCUTAneous 3 times per day       Allergies:  Patient has no known allergies. Social History:    TOBACCO:    Smokes about 1/2 pack a day in the past year. Used to smoke 21/2 packs/day in    the past 35 years. He has never used smokeless tobacco.  ETOH:    No   DRUGS:   Marijuana use occassionally   MARITAL STATUS:        Family History:   No immunodeficiency    REVIEW OF SYSTEMS:    No fever / chills / sweats. No weight loss. No visual change, eye pain, eye discharge. No oral lesion, sore throat, dysphagia. Denies cough / sputum. Denies chest pain, palpitations. Denies n / v / abd pain. No diarrhea. Denies dysuria or change in urinary function.   Denies joint swelling or pain.  No myalgia, arthralgia. Denies skin changes, itching  Denies focal weakness, sensory change or other neurologic symptom    Denies new / worse depression, psychiatric symptoms    PHYSICAL EXAM:      Vitals:    BP (!) 142/73   Pulse 53   Temp 97.4 °F (36.3 °C) (Oral)   Resp 17   Ht 5' 8\" (1.727 m)   Wt 229 lb 11.5 oz (104.2 kg)   SpO2 91%   BMI 34.93 kg/m²     GENERAL: No apparent distress. HEENT: Membranes moist, no oral lesion, PERRL  NECK:  Supple, no lymphadenopathy  LUNGS: Clear b/l, no rales, no dullness  CARDIAC: RRR, no murmur appreciated  ABD:  + BS, soft / NT  EXT:  Wound vac in situ L groin  NEURO: No focal neurologic findings  PSYCH: Orientation, sensorium, mood normal  LINES:  Peripheral iv    DATA:    Lab Results   Component Value Date    WBC 7.2 07/13/2022    HGB 12.5 (L) 07/13/2022    HCT 37.9 (L) 07/13/2022    MCV 92.6 07/13/2022     07/13/2022     Lab Results   Component Value Date    CREATININE 0.7 (L) 07/13/2022    BUN 14 07/13/2022     07/13/2022    K 4.3 07/13/2022    CL 99 07/13/2022    CO2 30 07/13/2022       Hepatic Function Panel:   Lab Results   Component Value Date/Time    ALKPHOS 93 07/10/2022 11:20 AM    ALT 18 07/10/2022 11:20 AM    AST 20 07/10/2022 11:20 AM    PROT 7.8 07/10/2022 11:20 AM    PROT 7.2 09/17/2010 08:12 AM    BILITOT 0.7 07/10/2022 11:20 AM    BILIDIR 0.12 09/17/2010 08:12 AM    IBILI 0.2 09/17/2010 08:12 AM    LABALBU 3.4 07/13/2022 06:41 AM       Micro:  Wound culture (7/11)  Gram stain   2+ WBC's (Polymorphonuclear)   2+ Gram positive cocci  Organism: BHS Group B (Strep agalacticae)    MRSA DNA probe, Nasal (7/11): not detected     Imaging:   VL DUP LOWER EXTREMITY ARTERIES LEFT (7/11)  Left Impression   There is no evidence of pseudoaneurysm in the left groin. There is a large   non- vascular complex fluid collection measuring 8.5 x 6.1 x 5.3 cm. There is   no previous exam for comparison. Summary   Left groin fluid collection.  No evidence of pseudoaneurysm    XR CHEST PORTABLE (7/12)  Impression:  Clear lungs. Borderline cardiomegaly status post CABG. IMPRESSION:      Patient Active Problem List   Diagnosis    PVD (peripheral vascular disease) ([address]    Abnormal EKG    Claudication in peripheral vascular disease ([address]    Popliteal aneurysm ([address]    PVD (peripheral vascular disease) with claudication ([address]    Wound dehiscence    Groin abscess    Abscess of groin, left     Carmel Mix, a 77 yo M PMH HTN, HLD, peripheral vascular disease and wound dehiscence following vascular surgery (Dr Brinda Garzon), is being managed for L. groin abscess. 1. L. Groin swelling and abscess  Presented with L groin swelling at previous surgical incision site. Induration + tenderness + skin erythema + on presentation  CT thigh showed wound abscess  Wound culture + Strep agalactiae   Wound debridement/washout and vac placement done 7/13  L groin swelling and pain has improved from admission. RECOMMENDATIONS:  Ct IV Unasyn    Wound dressing per surgery   HTN mgt per primary team     Discussed with pt, wife, Dr Khurram Snider MD     Addendum to Resident Consult note:  Pt seen, examined and evaluated. I have reviewed the current history, physical findings, labs and assessment and plan and agree with note as documented by resident (Dr. Latanya Lamar). 77 yo man with hx obesity (BMI 34), PVD, HTN, HL  12/29/21 - L fem - SFA to BK popliteal bypass with in situ saphanous v  3/20/22 R fem to BK popliteal a bypase with in situ saphenous v    Presents with L groin swelling - worse over 1 week, worsening pain and swelling. No fever / chills. + red / firm.     In ED, T 99.5, WBC  CT with fluid collection in prox thigh, fat stranding  Admit, Vanco + Cefepime  Doppler u/s - no pseudoaneurysm  Surg I&D 7/11 (bedside), 400 ml purulent fluid, cult heavy growth GBS  Return to OR 7/13 (OR), I&D, placed VAC (graft not visible, 'adequately covered with tissue' per brief OR report)    IMP/  L groin abscess, cult + GBS  Concern infection / bacterial contamination of underlying graft     REC/  Cont Unasyn  Wound care per Vascular    Recommend d/c on po augmentin 875 bid x 2 weeks then   Then, po Amoxacillin 500 mg po bid x 6+ mo   F/u with Vascular - Dr Cecilia Sanchez. Need his opinion whether chronic suppression desired for poss graft infection    Medical Decision Making: The following items were considered in medical decision making:  Discussion of patient care with other providers  Reviewed clinical lab tests  Reviewed radiology tests  Reviewed other diagnostic tests/interventions  Microbiology cultures and other micro tests reviewed      Risk of Complications/Morbidity: High   Illness(es)/ Infection present that pose threat to bodily function. There is potential for severe exacerbation of infection/side effects of treatment.   Therapy requires intensive monitoring for antimicrobial agent toxicity    Discussed with pt   Elieser Heredia MD

## 2022-07-13 NOTE — PROGRESS NOTES
Clinical Pharmacy Progress Note    Vancomycin - Management by Pharmacy    Consult Date(s):  7/10/22  Consulting Provider(s):  Dr. Milan Richardson / Plan:  1)  Left groin abscess and left thigh celluitis - Vancomycin   Concurrent Antimicrobials: Cefepime    Day of Vanc Therapy / Ordered Duration: 4 of 7   Current Dosing Method: Bayesian-guided AUC  o Therapeutic Goal: AUC = 400-600 mg/L*hr  o Currently on 1250mg IV q12h. Based on level 7/12, calculated AUC = 441 with steady state trough = 13.9. Continue same regimen.  o If patient continues to improve clinically, renal function remains stable, and long course of Vancomycin is not planned, may not require repeat levels. Will monitor and order repeat level if clinically indicated.  Will continue to monitor clinical condition and make adjustments to regimen as appropriate. Please call with questions--  Thanks--  Sue Wiggins, PharmD, BCPS, BCGP  [id number] ([facility])   7/13/2022 9:52 AM        Interval update:  Planning for wound washout with wound vac placement in OR today. Subjective/Objective:   Lalo Pascal is a 78 y.o. y/o male with a PMHx that includes CAD, HTN, PVD, HLD, and b/l femoral graft placement (left 12/2021, right 3/2022) who is admitted with left groin pain / swelling - being treated for left groin abscess with left thigh cellulitis. Pt underwent bedside I&D of left groin abscess by surgery on 7/11. Pharmacy is consulted to dose Vancomycin.     Ht Readings from Last 1 Encounters:   07/10/22 5' 8\" (1.727 m)     Wt Readings from Last 1 Encounters:   07/10/22 229 lb 11.5 oz (104.2 kg)       Current and Prior Antimicrobials:  Cefepime 2000mg EI q12h (7/10-current)  Vancomycin - Pharmacy to dose   2000mg IV x1 7/10 14:00   1250mg IV q12h (7/11-current)    Vanc Level(s) / Doses:  Date Time Dose Level / Type of Level Interpretation   7/12 05:50 1250mg q12h Random = 20.6 mcg/mL · Drawn ~40 min after 4th dose finished infusing (represents peak)  · Calculated AUC = 441 with trough = 13.9  · Continue 1250mg q12h          Note: Serum levels collected for AUC-based dosing may be high if collected in close proximity to the dose administered. This is not necessarily an indicator of toxicity. Recent Labs     07/10/22  1120 07/11/22  0544 07/11/22  1020 07/12/22  0550 07/13/22  0641   CREATININE   < >  --  0.7* 0.7* 0.7*   BUN   < >  --  14 15 14   WBC  --  8.5  --  7.6  7.5 7.2    < > = values in this interval not displayed. Estimated Creatinine Clearance: 121 mL/min (A) (based on SCr of 0.7 mg/dL (L)).     Cultures & Sensitivities:  Date Site Micro Susceptibility / Result   7/11 MRSA nasal PCR Not detected    7/12 Abscess Group B Strep

## 2022-07-13 NOTE — CONSULTS
Vancomycin has been discontinued. Pharmacy will sign off consult. If medication dosing is resumed, please re-consult pharmacy. Please call with any questions.   Erika Wills PharmD, BCPS  Main pharmacy: [id number]  7/13/2022 12:23 PM

## 2022-07-13 NOTE — PROGRESS NOTES
PACU Transfer Note    Vitals:    07/13/22 1239   BP: (!) 148/79   Pulse: 56   Resp: 13   Temp: 97.8 °F (36.6 °C)   SpO2: 93%     BP within 20% of baseline. In: 0 [P.O.:120; I.V.:530]  Out: 0     Pain assessment:  none  Pain Level: 1    Report was called to Gavin Hartman and made aware pt was returning.      7/13/2022 12:45 PM

## 2022-07-13 NOTE — PROGRESS NOTES
Department of Surgery  Post Op Note    Subjective: pt resting comfortably in bed. Denies pain. States he is ready to go home. Objective:  Anesthesia type: General    Exam: VITALS:  BP (!) 142/73   Pulse 53   Temp 97.4 °F (36.3 °C) (Oral)   Resp 17   Ht 5' 8\" (1.727 m)   Wt 229 lb 11.5 oz (104.2 kg)   SpO2 91%   BMI 34.93 kg/m²   Post-op vital signs:  Stable   EBL:  minimal  Exam:General appearance: alert, appears stated age and cooperative  Lungs: clear to auscultation bilaterally  Heart: regular rate and rhythm, S1, S2 normal, no murmur, click, rub or gallop  Abdomen: soft, non-tender; bowel sounds normal; no masses,  no organomegaly  LLE: Wound vac in place with adequate seal. Some fady wound erythema. Assessment and Plan  Pt is a 78year old male s/p Left thigh abscess washout with wound vac placement. POD #0    Pain management: prn tylenol, oxycodone prn  FeNa:  Diet - regular, Fluids NS @ 100ml/hour   :  Will continue to monitor,pt states he has the urge but has not voided yet.    Ambulation: OOB to chair  Respiratory:  IS at bedside, encourage hourly IS and deep breathing  Prophylaxis: AC boots    - wound vac paperwork completed and given to Jimmie Mcrae CNP  7/13/2022  2:36 PM  hiral

## 2022-07-13 NOTE — PROGRESS NOTES
Patient admitted to PACU # 09 from OR at 1202 post [id number] [facility] - Left   per Dr. Leticia Castaneda. Attached to PACU monitoring system and report received from anesthesia provider. Patient was reported to be hemodynamically stable during procedure. Patient drowsy on admission and denied pain. Pt has wound vac to L thigh. Wound vac on and plugged in. Pt NSR on monitor. Pt on 4 L NC. Will continue to monitor.

## 2022-07-13 NOTE — ANESTHESIA PRE PROCEDURE
Department of Anesthesiology  Preprocedure Note       Name:  Cassia Guzmán   Age:  78 y.o.  :  1955                                          MRN:  [id number]         Date:  2022      Surgeon: Nelda Adrian):  Ivonne Mejia MD    Procedure: Procedure(s):  LEFT THIGH ABSCESS WASHOUT WITH WOUND VAC PLACEMENT    Medications prior to admission:   Prior to Admission medications    Medication Sig Start Date End Date Taking? Authorizing Provider   levothyroxine (SYNTHROID) 112 MCG tablet  22   Historical Provider, MD   rosuvastatin (CRESTOR) 20 MG tablet Take 1 tablet by mouth daily 22   John Brito MD   metoprolol succinate (TOPROL XL) 25 MG extended release tablet Take 1 tablet by mouth daily 22   Leni Scott MD   lisinopril (PRINIVIL;ZESTRIL) 5 MG tablet Take 1 tablet by mouth daily 21   John Brito MD   aspirin 81 MG chewable tablet Take 81 mg by mouth daily. Historical Provider, MD       Current medications:    No current facility-administered medications for this visit. No current outpatient medications on file.      Facility-Administered Medications Ordered in Other Visits   Medication Dose Route Frequency Provider Last Rate Last Admin    lactated ringers infusion   IntraVENous Continuous Zohreh Bigornia Beltami,  mL/hr at 22 2331 New Bag at 22 2331    lisinopril (PRINIVIL;ZESTRIL) tablet 5 mg  5 mg Oral Daily Carolyne Lewis MD   5 mg at 22 0948    morphine (PF) injection 2 mg  2 mg IntraVENous Q4H PRN [place], APRN - NP        Or   Crista Sevilla morphine (PF) injection 4 mg  4 mg IntraVENous Q4H PRN [place], APRN - NP   4 mg at 22 1438    metoprolol succinate (TOPROL XL) extended release tablet 25 mg  25 mg Oral Daily Carolyne Lewis MD   25 mg at 22 0747    cefepime (MAXIPIME) 2000 mg IVPB minibag  2,000 mg IntraVENous Q12H Chase Liang MD   Stopped at 22 0003    rosuvastatin (CRESTOR) tablet 20 mg  20 mg Oral Daily Myles Garcia MD   20 mg at 07/13/22 0948    sodium chloride flush 0.9 % injection 5-40 mL  5-40 mL IntraVENous 2 times per day Myles Garcia MD   10 mL at 07/12/22 2037    sodium chloride flush 0.9 % injection 5-40 mL  5-40 mL IntraVENous PRN Sandrine Garcia MD        0.9 % sodium chloride infusion   IntraVENous PRN Sandrine Garcia MD        ondansetron (ZOFRAN-ODT) disintegrating tablet 4 mg  4 mg Oral Q8H PRN Sandrine Garcia MD        Or    ondansetron (ZOFRAN) injection 4 mg  4 mg IntraVENous Q6H PRN Sandrine Garcia MD        polyethylene glycol (GLYCOLAX) packet 17 g  17 g Oral Daily PRN Sandrine Garcia MD        acetaminophen (TYLENOL) tablet 650 mg  650 mg Oral Q6H PRN Sandrine Garcia MD   650 mg at 07/13/22 0948    Or    acetaminophen (TYLENOL) suppository 650 mg  650 mg Rectal Q6H PRN Sandrine Garcia MD        heparin (porcine) injection 5,000 Units  5,000 Units SubCUTAneous 3 times per day Myles Garcia MD   5,000 Units at 07/13/22 0620    vancomycin (VANCOCIN) 1,250 mg in dextrose 5 % 250 mL IVPB  15 mg/kg (Adjusted) IntraVENous Q12H Myles Garcia MD   Stopped at 07/13/22 0319       Allergies:  No Known Allergies    Problem List:    Patient Active Problem List   Diagnosis Code    PVD (peripheral vascular disease) ([facility] [address] I73.9    Abnormal EKG R94.31    Claudication in peripheral vascular disease (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    PVD (peripheral vascular disease) with claudication (HCC) I73.9    Wound dehiscence T81.30XA    Groin abscess L02.214    Abscess of groin, left L02.214       Past Medical History:        Diagnosis Date    CAD (coronary artery disease)     Hyperlipidemia     Hypertension     No longer taking meds    Thyroid disease     No longer taking meds    Wears dentures     upper       Past Surgical History:        Procedure Laterality Date    ARTERIAL BYPASS SURGRY triple in 2009   [address]  2011    CABG x3 vessel    FEMORAL BYPASS Left 12/29/2021    LEFT FEMORAL POPLITEAL BYPASS performed by Sarmad Alarcon MD at [address] [facility] Right 3/10/2022    RIGHT BELOW THE KNEE POPLITEAL INSITU BYPASS GRAFT performed by Sarmad Alarcon MD at [address] Right 3/15/2022    WOUND IRRIGATION AND CLOSURE RIGHT LEG SURGICAL WOUND performed by Sarmad Alarcon MD at [facility] History:    Social History     Tobacco Use    Smoking status: Current Every Day Smoker     Packs/day: 1.00     Types: Cigarettes    Smokeless tobacco: Never Used    Tobacco comment: 3/7/22 -  now smoking 3-7 cigs/day   Substance Use Topics    Alcohol use: No                                Ready to quit: Not Answered  Counseling given: Not Answered  Comment: 3/7/22 -  now smoking 3-7 cigs/day      Vital Signs (Current): There were no vitals filed for this visit.                                            BP Readings from Last 3 Encounters:   07/13/22 (!) 147/65   07/10/22 (!) 144/56   04/08/22 (!) 168/80       NPO Status:                                                                                 BMI:   Wt Readings from Last 3 Encounters:   07/10/22 229 lb 11.5 oz (104.2 kg)   07/10/22 230 lb (104.3 kg)   04/08/22 225 lb (102.1 kg)     There is no height or weight on file to calculate BMI.    CBC:   Lab Results   Component Value Date/Time    WBC 7.2 07/13/2022 06:41 AM    RBC 4.09 07/13/2022 06:41 AM    HGB 12.5 07/13/2022 06:41 AM    HCT 37.9 07/13/2022 06:41 AM    MCV 92.6 07/13/2022 06:41 AM    RDW 14.1 07/13/2022 06:41 AM     07/13/2022 06:41 AM       CMP:   Lab Results   Component Value Date/Time     07/13/2022 06:41 AM    K 4.3 07/13/2022 06:41 AM    K 3.9 07/10/2022 11:20 AM    CL 99 07/13/2022 06:41 AM    CO2 30 07/13/2022 06:41 AM    BUN 14 07/13/2022 06:41 AM    CREATININE 0.7 07/13/2022 06:41 AM    GFRAA >60 07/13/2022 06:41 AM    AGRATIO 1.1 07/10/2022 11:20 AM    LABGLOM >60 07/13/2022 06:41 AM    GLUCOSE 161 07/13/2022 06:41 AM    PROT 7.8 07/10/2022 11:20 AM    PROT 7.2 09/17/2010 08:12 AM    CALCIUM 8.5 07/13/2022 06:41 AM    BILITOT 0.7 07/10/2022 11:20 AM    ALKPHOS 93 07/10/2022 11:20 AM    AST 20 07/10/2022 11:20 AM    ALT 18 07/10/2022 11:20 AM       POC Tests: No results for input(s): POCGLU, POCNA, POCK, POCCL, POCBUN, POCHEMO, POCHCT in the last 72 hours. Coags:   Lab Results   Component Value Date/Time    PROTIME 14.3 07/13/2022 06:40 AM    INR 1.11 07/13/2022 06:40 AM       HCG (If Applicable): No results found for: PREGTESTUR, PREGSERUM, HCG, HCGQUANT     ABGs: No results found for: PHART, PO2ART, UMG5ZOF, LTI9GKS, BEART, M1GEJTFS     Type & Screen (If Applicable):  No results found for: LABABO, LABRH    Drug/Infectious Status (If Applicable):  No results found for: HIV, HEPCAB    COVID-19 Screening (If Applicable):   Lab Results   Component Value Date/Time    COVID19 Not Detected 03/15/2022 12:00 PM    COVID19 Not Detected 03/07/2022 10:42 AM           Anesthesia Evaluation   no history of anesthetic complications:   Airway: Mallampati: II  TM distance: <3 FB   Neck ROM: limited  Mouth opening: > = 3 FB   Dental:    (+) upper dentures      Pulmonary:   (+) current smoker                           Cardiovascular:    (+) hypertension:, CAD:, CHF: systolic, hyperlipidemia         : dose held this am.      ROS comment: The left ventricular systolic function is moderately reduced with an   ejection fraction of 35-40 %. Global left ventricular hypokinesis is present. There is mild concentric left ventricular hypertrophy. Grade II diastolic dysfunction with elevated left ventricular filling   pressure. The left atrium is severely dilated. The right atrium is mildly dilated. Mild tricuspid regurgitation. Systolic pulmonary artery pressure (SPAP) is normal estimated at 38 mmHg   (Right atrial pressure of 3 mmHg).         Neuro/Psych: GI/Hepatic/Renal: Neg GI/Hepatic/Renal ROS  (+) morbid obesity          Endo/Other: Negative Endo/Other ROS                    Abdominal:             Vascular:   + PVD, aortic or cerebral (spfem pop bypass), . Other Findings:             Anesthesia Plan      general     ASA 3     (Pt agrees to risks, benefits and alternatives of GA  Questions answered. Willing to proceed with plan. Left groin abscess and proximal left thigh cellulitis  CT of the left femur consistent with femoral abscess. Seen by vascular surgery team, suspect pseudoaneurysm versus abscess)  Induction: intravenous. Anesthetic plan and risks discussed with patient. Plan discussed with CRNA.                     Anette Magallanes MD   7/13/2022

## 2022-07-13 NOTE — FLOWSHEET NOTE
Dr. Luis Roque made aware of BP. Pt denies any pain. Dr. Luis Roque said to give his scheduled PO metoprolol. Medication requested from pharmacy.

## 2022-07-13 NOTE — PROGRESS NOTES
Patient A&Ox4. VSS on RA. Left groin drsg cdi. UAL to restroom. LR and IV abx administered per MAR. Pt has been NPO. Consent for I&D with washout in chart. No significant events overnight. All care needs addressed with no further needs at this time. Bed is locked and in lowest position. Call light and bedside table within reach. Will continue to monitor per protocol.      Electronically signed by Brandy Davison RN on 7/13/2022 at 5:42 AM

## 2022-07-13 NOTE — PROGRESS NOTES
ICU Progress Note    Admit Date: 7/10/2022  Day: 4  Vent Day: None  IV Access:Peripheral  IV Fluids:None  Vasopressors:None                Antibiotics: None  Diet: ADULT DIET; Regular    CC: L Groin swelling    Interval history: Patient seen and examined at bedside. Washout and wound vac OR planned for around 12:30 today according to the OR schedule. Social Work on board for post-procedure discharge planning. We checked his Hg A1c which is 8.0. His blood glucose has been stable and this can be addressed outpatient unless glucose becomes unsteady. HPI:    71 yo M p/w L groin swelling. He had bilateral femoral grafts, left side was done [facility] in December, 2021 while the right side was done in March, 2022. After the surgery, he had a residual swelling on the left. The left groin swelling is on the incision site and was first noticed about two days ago and has been progressively worsening. There is associated pain and erythema on the site of the swelling. Pain is 8/10, non-radiating. Pt denies fever, any discharge or trauma to the site, LL weakness. He however endorses chills, nausea and diarrhea.       Medications:     Scheduled Meds:   ampicillin-sulbactam  3,000 mg IntraVENous Q6H    lisinopril  5 mg Oral Daily    metoprolol succinate  25 mg Oral Daily    rosuvastatin  20 mg Oral Daily    sodium chloride flush  5-40 mL IntraVENous 2 times per day    heparin (porcine)  5,000 Units SubCUTAneous 3 times per day     Continuous Infusions:   lactated ringers 100 mL/hr at 07/13/22 1225    sodium chloride       PRN Meds:sodium chloride flush, sodium chloride, ondansetron **OR** ondansetron, polyethylene glycol, acetaminophen **OR** acetaminophen    Objective:   Vitals:   T-max:  Patient Vitals for the past 8 hrs:   BP Temp Temp src Pulse Resp SpO2   07/13/22 1245 (!) 142/73 97.4 °F (36.3 °C) Oral 53 17 91 %   07/13/22 1239 (!) 148/79 97.8 °F (36.6 °C) Oral 56 13 93 %   07/13/22 1230 (!) 174/78 -- -- 57 15 98 %   07/13/22 1215 (!) 166/86 -- -- 63 17 97 %   07/13/22 1210 (!) 169/90 -- -- 63 (!) 9 97 %   07/13/22 1205 (!) 161/92 -- -- 64 15 95 %   07/13/22 1202 (!) 166/77 (!) 96.2 °F (35.7 °C) Temporal 63 (!) 8 94 %   07/13/22 0951 -- -- -- 53 -- --   07/13/22 0905 -- -- -- 54 -- --   07/13/22 0747 (!) 147/65 97.8 °F (36.6 °C) Oral 63 20 93 %       Intake/Output Summary (Last 24 hours) at 7/13/2022 1349  Last data filed at 7/13/2022 1245  Gross per 24 hour   Intake 890 ml   Output 0 ml   Net 890 ml       Review of Systems   All other systems reviewed and are negative. A 10 point review of systems was conducted, significant findings as noted in HPI. Constitutional:       Appearance: Normal appearance. HENT:      Head: Normocephalic and atraumatic. Mouth/Throat:      Mouth: Mucous membranes are moist.   Eyes:      Pupils: Pupils are equal, round, and reactive to light. Cardiovascular:      Rate and Rhythm: Normal rate and regular rhythm. Heart sounds: Normal heart sounds. Pulmonary:      Effort: Pulmonary effort is normal.      Breath sounds: Normal breath sounds. Abdominal:      General: Bowel sounds are normal.      Palpations: Abdomen is soft. Musculoskeletal:      Right lower leg: No edema. Left lower leg: No edema. Skin:     Findings: Erythema and swelling present. Comments: L tender groin swelling   Neurological:      Mental Status: He is alert and oriented to person, place, and time.    LABS:    CBC:   Recent Labs     07/11/22  0544 07/12/22  0550 07/13/22  0641   WBC 8.5 7.6  7.5 7.2   HGB 13.5 12.8*  12.8* 12.5*   HCT 39.9* 38.5*  37.9* 37.9*    230  227 272   MCV 91.4 92.8  91.3 92.6     Renal:    Recent Labs     07/11/22  1020 07/12/22  0550 07/13/22  0641   * 134* 136   K 3.7 3.9 4.3   CL 98* 98* 99   CO2 26 29 30   BUN 14 15 14   CREATININE 0.7* 0.7* 0.7*   GLUCOSE 172* 183* 161*   CALCIUM 8.8 8.5 8.5   MG 1.90 2.20 2.10   PHOS 3.0 3.5 3.5   ANIONGAP 9 7 7 Hepatic:   Recent Labs     07/11/22  1020 07/12/22  0550 07/13/22  0641   LABALBU 3.6 3.3* 3.4     Troponin:   No results for input(s): TROPONINI in the last 72 hours. BNP: No results for input(s): BNP in the last 72 hours. Lipids: No results for input(s): CHOL, HDL in the last 72 hours. Invalid input(s): LDLCALCU, TRIGLYCERIDE  ABGs:  No results for input(s): PHART, LAB0LVB, PO2ART, ZRW9JCR, BEART, THGBART, U7EMHUBN, KKM5ANP in the last 72 hours. INR:   Recent Labs     07/11/22  0544 07/13/22  0640   INR 1.16* 1.11     Lactate: No results for input(s): LACTATE in the last 72 hours. Cultures:  -----------------------------------------------------------------  RAD:   XR CHEST PORTABLE   Final Result      Clear lungs. Borderline cardiomegaly status post CABG. VL DUP LOWER EXTREMITY ARTERIES LEFT   Final Result            Assessment/Plan:   Cassia Guzmán, a 77 yo M PMH HTN, HLD, peripheral vascular disease following vascular surgery (Dr Estephania Fonseca), presented to the ED with complaints of left groin swelling. 1. L leg cellulitis and abscess/fluid collection  Left leg swelling and erythema at previous femoral graft surgery incision site. CT L fermur showed femoral fluid collection.   -Started on vancomycin and Cefepime for broad coverage.   -Morphine for pain   -LE Doppler pending   -Vascular Surgery following - I&D  -Wound care consulted  -Wound culture: Group B Strep       2. HTN   Continue home meds     3. DM  -Hg A1c 8.0  -Blood glucose monitoring, can be treated outpatient    Code Status: Full Code  FEN: ADULT DIET;  Regular  PPX: SCD  DISPO: Laurie Nam MD, PGY-1  07/13/22  1:49 PM    This patient has been staffed and discussed with Martinez Corey MD.

## 2022-07-13 NOTE — ANESTHESIA POSTPROCEDURE EVALUATION
Department of Anesthesiology  Postprocedure Note    Patient: Jessie Guzmán  MRN: [id number]  YOB: 1955  Date of evaluation: 7/13/2022      Procedure Summary     Date: 07/13/22 Room / Location: [address] [place] [address]    Anesthesia Start: 0756 Anesthesia Stop: 9665    Procedure: LEFT THIGH ABSCESS WASHOUT WITH WOUND VAC PLACEMENT (Left Thigh) Diagnosis:       Abscess of left thigh      (Abscess of left thigh [L02.416])    Surgeons: Thanh Allan MD Responsible Provider: Kajal Espinoza MD    Anesthesia Type: general ASA Status: 3          Anesthesia Type: No value filed.     Ernesto Phase I: Ernesto Score: 9    Ernesot Phase II:        Anesthesia Post Evaluation    Patient location during evaluation: PACU  Level of consciousness: awake and alert  Airway patency: patent  Nausea & Vomiting: no vomiting  Complications: no  Cardiovascular status: blood pressure returned to baseline  Respiratory status: acceptable  Hydration status: euvolemic  Multimodal analgesia pain management approach

## 2022-07-13 NOTE — CARE COORDINATION
Cm following, pt to OR, had [address] order faxed to [facility] for approval with INS. Referral sent to [facility] and [address] [place] [address] for poss IV ABX and [facility] for wound care,.    Electronically signed by Natividad Hunter RN on 7/13/2022 at 2:26 PM   [phone]

## 2022-07-13 NOTE — PROGRESS NOTES
Vascular Surgery   Daily Progress Note  Patient: Miguel Angel Larkin    CC: Left thigh abscess    SUBJECTIVE:  Afebrile and HDS. No acute complaints overnight. Tolerating pain with PRN meds. OOB ambulating. Has been NPO since midnight. ROS: A 14 point review of systems was conducted, significant findings as noted above. All other systems negative. OBJECTIVE:   Infusions:   lactated ringers 100 mL/hr at 07/12/22 2331    sodium chloride        I/O:I/O last 3 completed shifts: In: 480 [P.O.:480]  Out: -            Wt Readings from Last 1 Encounters:   07/10/22 229 lb 11.5 oz (104.2 kg)       Exam:  /73   Pulse 60   Temp 97.4 °F (36.3 °C) (Oral)   Resp 16   Ht 5' 8\" (1.727 m)   Wt 229 lb 11.5 oz (104.2 kg)   SpO2 95%   BMI 34.93 kg/m²     General appearance: alert, no acute distress  Neuro: A&Ox3, no focal deficits, sensation intact  Eyes: No scleral icterus, EOM grossly intact  Neck: trachea midline, no JVD, no lymphadenopathy, neck supple  Chest/Lungs:  normal effort with no accessory muscle use on RA  Cardiovascular: RRR  Abdomen: Soft, non-tender, non-distended, no rebound, guarding, or rigidity. Skin: warm and dry, no rashes  Extremities: Proximal left thigh incision open, betadine kerlex packing, removed hemostatic, erythema within demarcation, induration present, no further purulence.  Tender to palpation    Vascular Exam:     carotid  radial  femoral  popliteal  DP  PT    LEFT  N/A + +  +  +  +    RIGHT   N/A +  +  +  +  +                LABS:   Recent Labs     07/11/22  0544 07/12/22  0550   WBC 8.5 7.6  7.5   HGB 13.5 12.8*  12.8*   HCT 39.9* 38.5*  37.9*   MCV 91.4 92.8  91.3    230  227        Recent Labs     07/11/22  1020 07/12/22  0550   * 134*   K 3.7 3.9   CL 98* 98*   CO2 26 29   PHOS 3.0 3.5   BUN 14 15   CREATININE 0.7* 0.7*        Recent Labs     07/10/22  1120   AST 20   ALT 18   BILITOT 0.7   ALKPHOS 93      No results for input(s): LIPASE, AMYLASE in the last 72 hours. Recent Labs     07/10/22  1120 07/11/22  0544   PROT 7.8  --    INR  --  1.16*        Recent Labs     07/10/22  1120   TROPONINI <0.01       Assessment/Plan:  Tisha Hill is a 78 y.o. male with Hx PVD, popliteal aneurysms, and bilateral graft placements (L side done in December 2021; R side in March 2022), with the R thigh graft complicated by wound dehiscence. Pt was experiencing increased swelling in the left groin and proximal left thigh. Now s/p I&D for abscess on 7/11/22. Heading to OR today for wound washout and vac placement.     - OR today for wound washout and wound vac placement  - Wound Cx with Group B Strep agalactiae. Continue broad spectrum abx (Vanc, Cefepime)  - Will discuss to transition to Unasyn with staff.    - Diet after procedure today  - Will plan for wound vac in the OR  -  consult for wound vac on d/c, [facility]      Mary Bell DO, [facility]  PGY1, General Surgery  07/13/22  6:15 AM  [place]  [phone]

## 2022-07-13 NOTE — OP NOTE
Operative Note      Patient: Mamie Ruiz  YOB: 1955  MRN: [id number]    Date of Procedure: 7/13/2022    Pre-Op Diagnosis: Abscess of left thigh [V92.022]    Post-Op Diagnosis: Same       Procedure(s):  LEFT THIGH ABSCESS WASHOUT WITH WOUND VAC PLACEMENT    Surgeon(s):  Orquidea Stearns MD    Assistant:   Resident: Francia Aldana DO    Anesthesia: Monitor Anesthesia Care    Estimated Blood Loss (mL): Minimal    Complications: None    Specimens:   * No specimens in log *    Implants:  * No implants in log *     Indication for Procedure:  Patient with a left thigh abscess that was previously incised an drained at bedside. Patient was brought to the operating room for more extensive washout, exploration of wound and assessment of femoral-popliteal bypass graft site and further debridement. Details of Procedure:  Patient was identified by stated name, [facility]-provided identification number, and wrist-band confirmation. The patient was then intubated and sedated by the anesthesia team and prepped and draped in the usual sterile fashion. Time-out was called immediately prior to the procedure and all present were in agreement. Skin was marked and existing wound was extended to excise the non-viable tissue down to and including skin and subcutaneous tissue using a combination of scalpel and bovie cut and coagulation. Excision was extended to viable and healthy tissue as evidenced by the presence of bleeding. Wound was 9cm in length and 3.5cm in width and 3cm in depth after debridement. Pulse lavage was used to irrigated the base of the wound and wound edges, a total of 3L sterile saline was used for irrigation. Bovie was then used to achieve hemostasis. Femoral-popliteal bypass graft was palpated at the base of the wound but not visible, as it was adequately covered and protected by viable tissue. A white foam was placed over the graft site and the base of the wound.  Black foam was placed on top of the white foam and then was used to track the dressing laterally for the wound vac track pad. The wound vac was placed to -125mmHg continuous suction with no evidence of a leak. Patient tolerated the procedure well. All counts were correct x2. Dr. Radha Montalvo was scrubbed and present for the entire procedure.      Electronically signed by James Esquivel DO on 7/13/2022 at 11:51 AM

## 2022-07-14 VITALS
BODY MASS INDEX: 34.82 KG/M2 | WEIGHT: 229.72 LBS | OXYGEN SATURATION: 92 % | RESPIRATION RATE: 18 BRPM | DIASTOLIC BLOOD PRESSURE: 53 MMHG | TEMPERATURE: 97.7 F | SYSTOLIC BLOOD PRESSURE: 164 MMHG | HEART RATE: 56 BPM | HEIGHT: 68 IN

## 2022-07-14 LAB
ALBUMIN SERPL-MCNC: 3.2 G/DL (ref 3.4–5)
ANION GAP SERPL CALCULATED.3IONS-SCNC: 9 MMOL/L (ref 3–16)
BASOPHILS ABSOLUTE: 0 K/UL (ref 0–0.2)
BASOPHILS RELATIVE PERCENT: 0.7 %
BLOOD CULTURE, ROUTINE: NORMAL
BUN BLDV-MCNC: 16 MG/DL (ref 7–20)
CALCIUM SERPL-MCNC: 8.4 MG/DL (ref 8.3–10.6)
CHLORIDE BLD-SCNC: 99 MMOL/L (ref 99–110)
CO2: 27 MMOL/L (ref 21–32)
CREAT SERPL-MCNC: 0.7 MG/DL (ref 0.8–1.3)
CULTURE, BLOOD 2: NORMAL
EKG ATRIAL RATE: 62 BPM
EKG DIAGNOSIS: NORMAL
EKG P AXIS: 41 DEGREES
EKG P-R INTERVAL: 184 MS
EKG Q-T INTERVAL: 432 MS
EKG QRS DURATION: 118 MS
EKG QTC CALCULATION (BAZETT): 438 MS
EKG R AXIS: -31 DEGREES
EKG T AXIS: 132 DEGREES
EKG VENTRICULAR RATE: 62 BPM
EOSINOPHILS ABSOLUTE: 0.1 K/UL (ref 0–0.6)
EOSINOPHILS RELATIVE PERCENT: 0.9 %
GFR AFRICAN AMERICAN: >60
GFR NON-AFRICAN AMERICAN: >60
GLUCOSE BLD-MCNC: 165 MG/DL (ref 70–99)
HCT VFR BLD CALC: 37.3 % (ref 40.5–52.5)
HEMOGLOBIN: 12.4 G/DL (ref 13.5–17.5)
LYMPHOCYTES ABSOLUTE: 1.5 K/UL (ref 1–5.1)
LYMPHOCYTES RELATIVE PERCENT: 20.4 %
MAGNESIUM: 1.9 MG/DL (ref 1.8–2.4)
MCH RBC QN AUTO: 30.5 PG (ref 26–34)
MCHC RBC AUTO-ENTMCNC: 33.1 G/DL (ref 31–36)
MCV RBC AUTO: 92.2 FL (ref 80–100)
MONOCYTES ABSOLUTE: 0.8 K/UL (ref 0–1.3)
MONOCYTES RELATIVE PERCENT: 10.8 %
NEUTROPHILS ABSOLUTE: 4.9 K/UL (ref 1.7–7.7)
NEUTROPHILS RELATIVE PERCENT: 67.2 %
PDW BLD-RTO: 14.1 % (ref 12.4–15.4)
PHOSPHORUS: 3.8 MG/DL (ref 2.5–4.9)
PLATELET # BLD: 268 K/UL (ref 135–450)
PMV BLD AUTO: 7.1 FL (ref 5–10.5)
POTASSIUM SERPL-SCNC: 4.5 MMOL/L (ref 3.5–5.1)
RBC # BLD: 4.05 M/UL (ref 4.2–5.9)
SODIUM BLD-SCNC: 135 MMOL/L (ref 136–145)
WBC # BLD: 7.3 K/UL (ref 4–11)

## 2022-07-14 PROCEDURE — 6370000000 HC RX 637 (ALT 250 FOR IP): Performed by: STUDENT IN AN ORGANIZED HEALTH CARE EDUCATION/TRAINING PROGRAM

## 2022-07-14 PROCEDURE — 2580000003 HC RX 258: Performed by: STUDENT IN AN ORGANIZED HEALTH CARE EDUCATION/TRAINING PROGRAM

## 2022-07-14 PROCEDURE — 83735 ASSAY OF MAGNESIUM: CPT

## 2022-07-14 PROCEDURE — 85025 COMPLETE CBC W/AUTO DIFF WBC: CPT

## 2022-07-14 PROCEDURE — 80069 RENAL FUNCTION PANEL: CPT

## 2022-07-14 PROCEDURE — 6360000002 HC RX W HCPCS: Performed by: STUDENT IN AN ORGANIZED HEALTH CARE EDUCATION/TRAINING PROGRAM

## 2022-07-14 PROCEDURE — 93010 ELECTROCARDIOGRAM REPORT: CPT | Performed by: INTERNAL MEDICINE

## 2022-07-14 PROCEDURE — 6360000002 HC RX W HCPCS

## 2022-07-14 PROCEDURE — 99232 SBSQ HOSP IP/OBS MODERATE 35: CPT | Performed by: INTERNAL MEDICINE

## 2022-07-14 PROCEDURE — 36415 COLL VENOUS BLD VENIPUNCTURE: CPT

## 2022-07-14 RX ORDER — OXYCODONE HYDROCHLORIDE 5 MG/1
5 TABLET ORAL EVERY 8 HOURS PRN
Qty: 9 TABLET | Refills: 0 | Status: SHIPPED | OUTPATIENT
Start: 2022-07-14 | End: 2022-07-17

## 2022-07-14 RX ORDER — OXYCODONE HYDROCHLORIDE 5 MG/1
5 TABLET ORAL EVERY 8 HOURS PRN
Qty: 9 TABLET | Refills: 0 | Status: SHIPPED | OUTPATIENT
Start: 2022-07-14 | End: 2022-07-14

## 2022-07-14 RX ORDER — MAGNESIUM SULFATE IN WATER 40 MG/ML
2000 INJECTION, SOLUTION INTRAVENOUS ONCE
Status: COMPLETED | OUTPATIENT
Start: 2022-07-14 | End: 2022-07-14

## 2022-07-14 RX ORDER — AMOXICILLIN AND CLAVULANATE POTASSIUM 875; 125 MG/1; MG/1
1 TABLET, FILM COATED ORAL 2 TIMES DAILY
Qty: 28 TABLET | Refills: 0 | Status: SHIPPED | OUTPATIENT
Start: 2022-07-14 | End: 2022-07-28

## 2022-07-14 RX ORDER — AMOXICILLIN AND CLAVULANATE POTASSIUM 875; 125 MG/1; MG/1
1 TABLET, FILM COATED ORAL 2 TIMES DAILY
Qty: 14 TABLET | Refills: 0 | Status: SHIPPED | OUTPATIENT
Start: 2022-07-14 | End: 2022-07-14 | Stop reason: SDUPTHER

## 2022-07-14 RX ADMIN — METOPROLOL SUCCINATE 25 MG: 25 TABLET, FILM COATED, EXTENDED RELEASE ORAL at 08:59

## 2022-07-14 RX ADMIN — MAGNESIUM SULFATE HEPTAHYDRATE 2000 MG: 2 INJECTION, SOLUTION INTRAVENOUS at 10:11

## 2022-07-14 RX ADMIN — LISINOPRIL 5 MG: 5 TABLET ORAL at 08:59

## 2022-07-14 RX ADMIN — SODIUM CHLORIDE, PRESERVATIVE FREE 10 ML: 5 INJECTION INTRAVENOUS at 08:59

## 2022-07-14 RX ADMIN — SODIUM CHLORIDE 3000 MG: 900 INJECTION INTRAVENOUS at 03:11

## 2022-07-14 RX ADMIN — ROSUVASTATIN CALCIUM 20 MG: 20 TABLET, COATED ORAL at 08:59

## 2022-07-14 RX ADMIN — HEPARIN SODIUM 5000 UNITS: 5000 INJECTION INTRAVENOUS; SUBCUTANEOUS at 06:49

## 2022-07-14 RX ADMIN — SODIUM CHLORIDE 3000 MG: 900 INJECTION INTRAVENOUS at 09:01

## 2022-07-14 NOTE — PROGRESS NOTES
Patient a/o x4. VSS. Wound vac intact. Pt denies pain at the site. tolerating diet. Saline locked with intermittent IV abx. Pt getting up with steady gait independently. Will continue to monitor.

## 2022-07-14 NOTE — DISCHARGE SUMMARY
Discharge Progress Note  7/14/2022 1:43 PM    Data:  Discharge order received. Action:  IV D/C'd without difficulty. See Doc Flowsheets for assessment. Patient given discharge instructions with prescriptions. Response:  Patient verbalized understanding of discharge instructions.  Patient left with all belongings to front lobby with family    Sae Aeyrs RN  ________________________________________________________________________

## 2022-07-14 NOTE — PLAN OF CARE
Problem: Discharge Planning  Goal: Discharge to home or other facility with appropriate resources  Outcome: Adequate for Discharge  Flowsheets (Taken 7/14/2022 1344)  Discharge to home or other facility with appropriate resources:   Identify barriers to discharge with patient and caregiver   Identify discharge learning needs (meds, wound care, etc)  Note: Discharge home with [facility] for wound vac.       Problem: Pain  Goal: Verbalizes/displays adequate comfort level or baseline comfort level  Outcome: Adequate for Discharge  Flowsheets (Taken 7/14/2022 1344)  Verbalizes/displays adequate comfort level or baseline comfort level:   Encourage patient to monitor pain and request assistance   Assess pain using appropriate pain scale   Administer analgesics based on type and severity of pain and evaluate response   Implement non-pharmacological measures as appropriate and evaluate response  Note: Denies any pain or discomfort

## 2022-07-14 NOTE — PROGRESS NOTES
(7/11)  Left Impression   There is no evidence of pseudoaneurysm in the left groin. There is a large   non- vascular complex fluid collection measuring 8.5 x 6.1 x 5.3 cm. There is   no previous exam for comparison. Summary   Left groin fluid collection. No evidence of pseudoaneurysm     XR CHEST PORTABLE (7/12)  Impression:  Clear lungs. Borderline cardiomegaly status post CABG.      Scheduled Meds:   magnesium sulfate  2,000 mg IntraVENous Once    ampicillin-sulbactam  3,000 mg IntraVENous Q6H    lisinopril  5 mg Oral Daily    metoprolol succinate  25 mg Oral Daily    rosuvastatin  20 mg Oral Daily    sodium chloride flush  5-40 mL IntraVENous 2 times per day    heparin (porcine)  5,000 Units SubCUTAneous 3 times per day       Continuous Infusions:   sodium chloride         PRN Meds:  oxyCODONE, sodium chloride flush, sodium chloride, ondansetron **OR** ondansetron, polyethylene glycol, acetaminophen **OR** acetaminophen      Assessment:     oswald Gaviria 79 yo M PMH HTN, HLD, peripheral vascular disease and wound dehiscence following vascular surgery (Dr Magy Diaz), is being managed for L. groin abscess.     1. L. Groin swelling and abscess  Presented with L groin swelling at previous surgical incision site. Induration + tenderness + skin erythema + on presentation  CT thigh showed wound abscess  Wound culture + Strep agalactiae   Wound debridement/washout and vac placement done 7/13  L groin swelling and pain has improved from admission. Plan:     Ct IV Unasyn    F/U with Dr Magy Diaz on discharge   Dicharge on po augmentin 875 bid x 2 weeks then   Then, po Amoxacillin 500 mg po bid x 6+ mo     Discussed with pt, Dr Joesph Brennan MD     Addendum to Resident Progress note:  Pt seen, examined and evaluated. I have reviewed the current history, physical findings, labs and assessment and plan and agree with note as documented by resident (Dr. Rekha Wyatt).      79 yo man with hx obesity (BMI 34), PVD, HTN, HL  12/29/21 - L fem - SFA to BK popliteal bypass with in situ saphanous v  3/20/22 R fem to BK popliteal a bypase with in situ saphenous v     Presents with L groin swelling - worse over 1 week, worsening pain and swelling. No fever / chills. + red / firm.     In ED, T 99.5, WBC  CT with fluid collection in prox thigh, fat stranding  Admit, Vanco + Cefepime  Doppler u/s - no pseudoaneurysm  Surg I&D 7/11 (bedside), 400 ml purulent fluid, cult heavy growth GBS  Return to OR 7/13 (OR), I&D, placed VAC (graft not visible, 'adequately covered with tissue' per brief OR report)     IMP/  L groin abscess, cult + GBS  Concern infection / bacterial contamination of underlying graft yet it is in situ saphenous not PFTE / gortex     REC/  Cont Unasyn  Wound care per Vascular     Recommend d/c on po augmentin 875 bid x 2 weeks   F/u with Vascular - Dr Lukas Pritchett. Need his opinion whether chronic suppression desired for poss graft infection     Medical Decision Making:   The following items were considered in medical decision making:  Discussion of patient care with other providers  Reviewed clinical lab tests  Reviewed radiology tests  Reviewed other diagnostic tests/interventions  Microbiology cultures and other micro tests reviewed       Discussed with pt   Ricarda Licea MD

## 2022-07-14 NOTE — DISCHARGE SUMMARY
[facility] SUMMARY    Patient ID: Ofelia Mills                                             Discharge Date: 7/14/2022   Patient's PCP: Shaggy Chavez, APRN - CNP                                          Discharge Physician: Shari Cesar MD MD  Admit Date: 7/10/2022   Admitting Physician: Rebecca Urena MD    DISCHARGE DIAGNOSES:  Present on Admission:   Groin abscess   Abscess of groin, left   Group B streptococcal infection      Hospital Course:    HPI: 77 yo M p/w L groin swelling. He had bilateral femoral grafts, left side was done [facility] in December, 2021 while the right side was done in March, 2022. After the surgery, he had a residual swelling on the left. The left groin swelling is on the incision site and was first noticed about two days ago and has been progressively worsening. There is associated pain and erythema on the site of the swelling. Pain is 8/10, non-radiating. Pt denies fever, any discharge or trauma to the site, LL weakness. He however endorses chills, nausea and diarrhea. Patient was admitted for further work up of left groin swelling. Blood culture on 7/10 had no growth, but wound culture on 7/11/2022 grew BHS Group B Strep agalacticae. Ultrasound on same day showed a large non- vascular complex fluid collection measuring 8.5 x 6.1 x 5.3 cm. Incision and Drainage were performed on 7/11/2022. Patient was on Vancomycin IV for 4 days, and Cefepime for 3. Switched to Unasyn on 7/13/2022. Washout and wound Vac on 7/13/2022. Patient set for discharge on 7/14/2022 with home healthcare to help with the wound vac device. Wife is on board. Infectious Disease says Amber on po augmentin 875 bid x 2 weeks then, po Amoxacillin 500 mg po bid x 6+ mo. We also ordered a Hg A1c while in hospital, and informed the patient it was 8.0%. Ordered metformin at discharge and to f/u with PCP outpatient for new Diabetes.      On the day of discharge, patient's left groin swelling has resolved. Patient denied fever, chills, shortness of breath, chest pain, palpitations, nausea, vomiting, abdominal pain, changes in urinary/bowel movements. Physical Exam:  BP (!) 164/53   Pulse 56   Temp 97.7 °F (36.5 °C) (Oral)   Resp 18   Ht 5' 8\" (1.727 m)   Wt 229 lb 11.5 oz (104.2 kg)   SpO2 92%   BMI 34.93 kg/m²     Constitutional:       Appearance: Normal appearance. HENT:      Head: Normocephalic and atraumatic.      Mouth/Throat:      Mouth: Mucous membranes are moist.   Eyes:      Pupils: Pupils are equal, round, and reactive to light. Cardiovascular:      Rate and Rhythm: Normal rate and regular rhythm.      Heart sounds: Normal heart sounds. Pulmonary:      Effort: Pulmonary effort is normal.      Breath sounds: Normal breath sounds. Abdominal:      General: Bowel sounds are normal.      Palpations: Abdomen is soft. Musculoskeletal:      Right lower leg: No edema.      Left lower leg: No edema. Skin:     Findings: Wound Vac device, L groin     Comments: L tender groin swelling   Neurological:      Mental Status: He is alert and oriented to person, place, and       Consults: ID  Significant Diagnostic Studies:  chest x-ray  Treatments: antibiotics  Disposition: home  Discharged Condition: Stable  Follow Up: Primary Care Physician in two weeks    DISCHARGE MEDICATION:     Medication List      START taking these medications    amoxicillin-clavulanate 875-125 MG per tablet  Commonly known as: AUGMENTIN  Take 1 tablet by mouth 2 times daily for 14 days     metFORMIN 500 MG tablet  Commonly known as: GLUCOPHAGE  Take 1 tablet by mouth 2 times daily (with meals)  Notes to patient: Metformin  (Glucophage)  USE--  Lower blood sugar in diabetes  SIDE EFFECTS--  Upset stomach, diarrhea     oxyCODONE 5 MG immediate release tablet  Commonly known as: ROXICODONE  Take 1 tablet by mouth every 8 hours as needed for Pain for up to 3 days.   Notes to patient: Oxycodone   USE--  Treat moderate to severe pain (contains opiate and tylenol)  SIDE EFFECTS--  Upset stomach, feeling sleepy, constipation  CAUTION driving or operating heavy machinery'        CONTINUE taking these medications    aspirin 81 MG chewable tablet     levothyroxine 112 MCG tablet  Commonly known as: SYNTHROID     lisinopril 5 MG tablet  Commonly known as: PRINIVIL;ZESTRIL  Take 1 tablet by mouth daily     metoprolol succinate 25 MG extended release tablet  Commonly known as:  Toprol XL  Take 1 tablet by mouth daily     rosuvastatin 20 MG tablet  Commonly known as: CRESTOR  Take 1 tablet by mouth daily           Where to Get Your Medications      You can get these medications from any pharmacy    Bring a paper prescription for each of these medications  · amoxicillin-clavulanate 875-125 MG per tablet  · metFORMIN 500 MG tablet  · oxyCODONE 5 MG immediate release tablet       Activity: activity as tolerated  Diet: regular diet  Wound Care: as directed    Time Spent on discharge is more than 30 minutes    Signed:  Mac Goldstein MD   PGY-1  7/14/2022

## 2022-07-14 NOTE — FLOWSHEET NOTE
07/14/22 1348   Encounter Summary   Encounter Overview/Reason  Initial Encounter   Service Provided For: Patient   Referral/Consult From: Rounding   Last Encounter  07/14/22  (7/14, hasmukh )   Complexity of Encounter Moderate   Begin Time 1040   End Time  1045   Total Time Calculated 5 min   Encounter    Type Initial Screen/Assessment   Assessment/Intervention/Outcome   Assessment Calm   Intervention Active listening;Discussed illness injury and its impact   Outcome Comfort;Expressed Gratitude; Refused/Declined   Staff Krystal Richardson

## 2022-07-14 NOTE — PROGRESS NOTES
Drake Dominguez NP about lab unable to obtain blood draw, and night shift nurse unable to obtain blood draw or to change IV site. She states she is going to speak with family today about plan of care and will let me know if she still wants blood draw. ICU Progress Note    Admit Date: 7/10/2022  Day: 5  Vent Day: None  IV Access:Peripheral  IV Fluids:None  Vasopressors:None                Antibiotics: None  Diet: ADULT DIET; Regular    CC: L Groin swelling    Interval history: Patient seen and examined at bedside. Wound VAC on surgery site. Patient endorses feeling great and ready to get out of the hospital. Social Work on board for post-procedure discharge planning. ID wants IV Unasyn here, 2 weeks Augmentin at discharge, then possibly 6 months amoxicillin after that. Surgery says [place] [id number] is set up, possibly today. HPI:    69 yo M p/w L groin swelling. He had bilateral femoral grafts, left side was done [facility] in December, 2021 while the right side was done in March, 2022. After the surgery, he had a residual swelling on the left. The left groin swelling is on the incision site and was first noticed about two days ago and has been progressively worsening. There is associated pain and erythema on the site of the swelling. Pain is 8/10, non-radiating. Pt denies fever, any discharge or trauma to the site, LL weakness. He however endorses chills, nausea and diarrhea.       Medications:     Scheduled Meds:   magnesium sulfate  2,000 mg IntraVENous Once    ampicillin-sulbactam  3,000 mg IntraVENous Q6H    lisinopril  5 mg Oral Daily    metoprolol succinate  25 mg Oral Daily    rosuvastatin  20 mg Oral Daily    sodium chloride flush  5-40 mL IntraVENous 2 times per day    heparin (porcine)  5,000 Units SubCUTAneous 3 times per day     Continuous Infusions:   sodium chloride       PRN Meds:oxyCODONE, sodium chloride flush, sodium chloride, ondansetron **OR** ondansetron, polyethylene glycol, acetaminophen **OR** acetaminophen    Objective:   Vitals:   T-max:  Patient Vitals for the past 8 hrs:   BP Temp Temp src Pulse Resp SpO2   07/14/22 0711 (!) 145/71 97.7 °F (36.5 °C) Oral 54 17 90 %   07/14/22 0304 133/72 98.1 °F (36.7 °C) Oral 63 14 90 %       Intake/Output Summary (Last 24 hours) at 7/14/2022 1021  Last data filed at 7/14/2022 0859  Gross per 24 hour   Intake 2140 ml   Output 0 ml   Net 2140 ml       Review of Systems   All other systems reviewed and are negative. A 10 point review of systems was conducted, significant findings as noted in HPI. Constitutional:       Appearance: Normal appearance. HENT:      Head: Normocephalic and atraumatic. Mouth/Throat:      Mouth: Mucous membranes are moist.   Eyes:      Pupils: Pupils are equal, round, and reactive to light. Cardiovascular:      Rate and Rhythm: Normal rate and regular rhythm. Heart sounds: Normal heart sounds. Pulmonary:      Effort: Pulmonary effort is normal.      Breath sounds: Normal breath sounds. Abdominal:      General: Bowel sounds are normal.      Palpations: Abdomen is soft. Musculoskeletal:      Right lower leg: No edema. Left lower leg: No edema. Skin:     Findings: Erythema and swelling present. Comments: L tender groin swelling   Neurological:      Mental Status: He is alert and oriented to person, place, and time. LABS:    CBC:   Recent Labs     07/12/22  0550 07/13/22  0641 07/14/22  0520   WBC 7.6  7.5 7.2 7.3   HGB 12.8*  12.8* 12.5* 12.4*   HCT 38.5*  37.9* 37.9* 37.3*     227 272 268   MCV 92.8  91.3 92.6 92.2     Renal:    Recent Labs     07/12/22  0550 07/13/22  0641 07/14/22  0520   * 136 135*   K 3.9 4.3 4.5   CL 98* 99 99   CO2 29 30 27   BUN 15 14 16   CREATININE 0.7* 0.7* 0.7*   GLUCOSE 183* 161* 165*   CALCIUM 8.5 8.5 8.4   MG 2.20 2.10 1.90   PHOS 3.5 3.5 3.8   ANIONGAP 7 7 9     Hepatic:   Recent Labs     07/12/22  0550 07/13/22  0641 07/14/22  0520   LABALBU 3.3* 3.4 3.2*     Troponin:   No results for input(s): TROPONINI in the last 72 hours. BNP: No results for input(s): BNP in the last 72 hours. Lipids: No results for input(s): CHOL, HDL in the last 72 hours.     Invalid input(s): LDLCALCU, TRIGLYCERIDE  ABGs:  No results for input(s): PHART, TNY9IRB, PO2ART, DAD6ZOH, BEART, THGBART, N7DDGURJ, WBC1DRL in the last 72 hours. INR:   Recent Labs     07/13/22  0640   INR 1.11     Lactate: No results for input(s): LACTATE in the last 72 hours. Cultures:  -----------------------------------------------------------------  RAD:   XR CHEST PORTABLE   Final Result      Clear lungs. Borderline cardiomegaly status post CABG. VL DUP LOWER EXTREMITY ARTERIES LEFT   Final Result            Assessment/Plan:   Hugo Edmondson, a 77 yo M PMH HTN, HLD, peripheral vascular disease following vascular surgery (Dr Sheeba Mckenna), presented to the ED with complaints of left groin swelling. 1. L leg cellulitis and abscess/fluid collection  Left leg swelling and erythema at previous femoral graft surgery incision site. CT L fermur showed femoral fluid collection.   -Started on vancomycin and Cefepime for broad coverage.   -Morphine for pain   -LE Doppler pending   -Vascular Surgery following - I&D  -Wound care consulted  -Wound culture: Group B Strep       2. HTN   Continue home meds     3. DM  -Hg A1c 8.0  -Blood glucose monitoring, can be treated outpatient    Code Status: Full Code  FEN: ADULT DIET;  Regular  PPX: SCD  DISPO: Carla Torres MD, PGY-1  07/14/22  10:21 AM    This patient has been staffed and discussed with Adela Medina MD.

## 2022-07-14 NOTE — CARE COORDINATION
Case Management Assessment            Discharge Note                    Date / Time of Note: 7/14/2022 11:48 AM                  Discharge Note Completed by: Alem Liang RN    Patient Name: My Perez   YOB: 1955  Diagnosis: Groin abscess [L02.214]  Abscess of groin, left [L02.214]   Date / Time: 7/10/2022  8:56 PM    Current PCP: MARCEL Camejo CNP  Clinic patient: No    Hospitalization in the last 30 days: No    Advance Directives:  Code Status: Full Code  [place] DNR form completed and on chart: Not Indicated    Financial:  Payor: Belem Encinas / Plan: Nurys Wakefield / Product Type: *No Product type* /      Pharmacy:    [facility], [address] [phone] [facility] [phone]  [address] [place] [address]  Phone: [phone] Fax: [address] [place] [address]  [address]  Phone: [phone] Fax: [phone]      Assistance purchasing medications?: Potential Assistance Purchasing Medications: No  Assistance provided by Case Management: None at this time    Does patient want to participate in local refill/ meds to beds program?: Yes    Meds To Beds General Rules:  1. Can ONLY be done Monday- Friday between 8:30am-5pm  2. Prescription(s) must be in pharmacy by 3pm to be filled same day  3. Copy of patient's insurance/ prescription drug card and patient face sheet must be sent along with the prescription(s)  4. Cost of Rx cannot be added to hospital bill. If financial assistance is needed, please contact unit  or ;  or  CANNOT provide pharmacy voucher for patients co-pays  5.  Patients can then  the prescription on their way out of the hospital at discharge, or pharmacy can deliver to the bedside if staff is available. (payment due at time of pick-up or delivery - cash, check, or card accepted)     Able to afford home medications/ co-pay costs: Yes    ADLS:  Current PT AM-PAC Score:   /24  Current OT AM-PAC Score:   /24      DISCHARGE Disposition: Home with Home Health Care: [facility]     LOC at discharge: Not Applicable  BLANCO Completed: Not Indicated    Notification completed in HENS/PAS?:  Not Applicable    IMM Completed:   Yes, Case management has presented and reviewed IMM letter #2 to the patient and/or family/ POA. Patient and/or family/POA verbalized understanding of their medicare rights and appeal process if needed. Patient and/or family/POA has signed, initialed and placed today's date (7/14/22) and time (1130) on IMM letter #2 on the the appropriate lines. Patient and/or family/POA, copy of letter offered and they are aware that this original copy of IMM letter #2 is available prior to discharge from the paper chart on the unit. Electronic documentation has been entered into epic for IMM letter #2 and original paper copy has been added to the paper chart at the nurses station. Transportation:  Transportation PLAN for discharge: family   Mode of Transport: [address] ordered at discharge: Yes  [address]: [place]  Phone: [phone]  Fax: [phone]  Orders faxed: Rosalva Jaramillo aware of DC will pull orders and follow    Durable Medical Equipment:  DME Provider: ERIC  Equipment obtained during hospitalization: home Wound Vac    Home Oxygen and Respiratory Equipment:  Oxygen needed at discharge?: Not [address]: Not Applicable  Portable tank available for discharge?: Not Indicated    Dialysis:  Dialysis patient: No    Dialysis Center:  Not Applicable      Additional CM Notes: Pt from home with wife will DC home with support of wife [place] from [place], and Neva Delgadillo with [facility], Rosalva aware of DC and following.       The Plan for Transition of Care is related to the following treatment goals of Groin abscess [L02.214]  Abscess of groin, left [L02.214]    The Patient and/or patient representative David Chand and his family were provided with a choice of provider and agrees with the discharge plan Yes    Freedom of choice list was provided with basic dialogue that supports the patient's individualized plan of care/goals and shares the quality data associated with the providers.  Yes    Care Transitions patient: No    Manuel Hopper RN  [facility]  Case Management Department  Ph: [phone]  Fax: [phone]

## 2022-07-14 NOTE — DISCHARGE INSTRUCTIONS
Vascular surgery:   - No driving while on narcotics  Otherwise, you can drive when you can sit comfortably behind the steering wheel and can slam on the brake without it hurting or turn the wheel sharply without it hurting. Practice this when sitting the car with it parked in your driveway before trying to drive. - May shower. No soaking in tub, hot tub, or pool. Do not submerge incision site in water. - Keep L thigh wound vac on and connected to dressing AT ALL TIMES. This is important for successful therapy. A home health nurse will change this dressing for you every 2 days.   - Notify MD immediately if experierencing fevers/chills, nausea/vomiting, pus-like discharge from incision sites, redness, swelling, or warmth around incisions. -normal Diet  - Follow up with Dr. Gloria Parsons in 2 weeks - [phone] for appointment. Internal Medicine  To follow up with your primary care physician in 1 week for review  New Medication: Metformin 500 mg twice daily. New antibiotic medication: amoxicillin-clavulinic acid 1 tab twice a day to complete 14 day course for you infection  If any issues, please contact your primary care doctor.

## 2022-07-14 NOTE — PROGRESS NOTES
Vascular Surgery   Daily Progress Note  Patient: Lalo Pascal    CC: Left thigh abscess    SUBJECTIVE:  Afebrile and HDS. No acute complaints overnight. Had very little to eat due to not being delivered dinner. ROS: A 14 point review of systems was conducted, significant findings as noted above. All other systems negative. OBJECTIVE:   Infusions:   sodium chloride        I/O:I/O last 3 completed shifts: In: 36 [P.O.:840; I.V.:1530]  Out: 0            Wt Readings from Last 1 Encounters:   07/10/22 229 lb 11.5 oz (104.2 kg)       Exam:  /72   Pulse 63   Temp 98.1 °F (36.7 °C) (Oral)   Resp 14   Ht 5' 8\" (1.727 m)   Wt 229 lb 11.5 oz (104.2 kg)   SpO2 90%   BMI 34.93 kg/m²     General appearance: alert, no acute distress  Neuro: A&Ox3, no focal deficits, sensation intact  Eyes: No scleral icterus, EOM grossly intact  Neck: trachea midline, no JVD, no lymphadenopathy, neck supple  Chest/Lungs:  normal effort with no accessory muscle use on RA  Cardiovascular: RRR  Abdomen: Soft, non-tender, non-distended, no rebound, guarding, or rigidity. Skin: warm and dry, no rashes  Extremities: wound vac in place, holding good suction. erythema, induration, no fluctuance    Vascular Exam:     carotid  radial  femoral  popliteal  DP  PT    LEFT  N/A + +  +  +  +    RIGHT   N/A +  +  +  +  +                LABS:   Recent Labs     07/12/22  0550 07/13/22  0641   WBC 7.6  7.5 7.2   HGB 12.8*  12.8* 12.5*   HCT 38.5*  37.9* 37.9*   MCV 92.8  91.3 92.6     227 272        Recent Labs     07/12/22  0550 07/13/22  0641   * 136   K 3.9 4.3   CL 98* 99   CO2 29 30   PHOS 3.5 3.5   BUN 15 14   CREATININE 0.7* 0.7*        No results for input(s): AST, ALT, ALB, BILIDIR, BILITOT, ALKPHOS in the last 72 hours. No results for input(s): LIPASE, AMYLASE in the last 72 hours.      Recent Labs     07/13/22  0640   INR 1.11        No results for input(s): CKTOTAL, CKMB, CKMBINDEX, TROPONINI in the last 72 hours. Assessment/Plan:  Campbell Price is a 78 y.o. male with Hx PVD, popliteal aneurysms, and bilateral graft placements (L side done in December 2021; R side in March 2022), with the R thigh graft complicated by wound dehiscence. Pt was experiencing increased swelling in the left groin and proximal left thigh. Now s/p I&D for abscess on 07/11/22. OR yesterday for wound washout and vac placement 07/13/22. POD1     - Wound Cx with Group B Strep agalactiae, Currently on IV Unasyn. Will transition to oral abx upon discharge. - ID following recommending 2 weeks of Augmentin, followed by 6 months of amoxicillin  -  consult for wound vac on d/c, [facility]  - will transition to home vac and can d/c if Neva Delgadillo set up       Alie Woodard DO, MSMEd  PGY1, General Surgery  07/14/22  6:27 AM    I am post-call today and will not be on campus. Please contact Dr. Deborah Silva at [phone] or Dr. Karma Mares at [phone] for questions or concerns regarding this patient.

## 2022-07-14 NOTE — DISCHARGE INSTR - COC
Continuity of Care Form    Patient Name: Miguel Angel Palomo   :  1955  MRN:  [id number]    Admit date:  7/10/2022  Discharge date:  ***    Code Status Order: Full Code   Advance Directives:      Admitting Physician:  Letty Haywood MD  PCP: MARCEL Stafford CNP    Discharging Nurse: [facility] Unit/Room#: 1008/5039-04  Discharging Unit Phone Number: ***    Emergency Contact:   Extended Emergency Contact Information  Primary Emergency Contact: Amber Gomes  Address: [address], [address] [address] Phone: [phone]  Work Phone: [phone]  Mobile Phone: [phone]  Relation: Spouse  Secondary Emergency Contact: [address] [place] [address]  Mobile Phone: [phone]  Relation: Spouse    Past Surgical History:  Past Surgical History:   Procedure Laterality Date    ARTERIAL BYPASS SURGRY      triple in     CARDIAC SURGERY      CABG x3 vessel    FEMORAL BYPASS Left 2021    LEFT FEMORAL POPLITEAL BYPASS performed by Braulio Do MD at [address] [place] [address] Right 3/10/2022    RIGHT BELOW THE KNEE POPLITEAL INSITU BYPASS GRAFT performed by Braulio Do MD at [place] [address] Left 2022    LEFT [address] [place] [address] performed by Pete Maher MD at [facility] Right 3/15/2022    WOUND IRRIGATION AND CLOSURE RIGHT LEG SURGICAL WOUND performed by Braulio Do MD at [place] [address]       Immunization History: There is no immunization history for the selected administration types on file for this patient.     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    PVD (peripheral vascular disease) with claudication ([facility]) I73.9    Wound dehiscence T81.30XA    Groin abscess L02.214    Abscess of groin, left L02.214    Group B streptococcal infection A49.1       Isolation/Infection:   Isolation            No Isolation          Patient Infection Status       Infection Onset Added Last Indicated Last Indicated By Review Planned Expiration Resolved Resolved By    None active    Resolved    COVID-19 (Rule Out) 03/15/22 03/15/22 03/15/22 COVID-19, Rapid (Ordered)   03/15/22 Rule-Out Test Resulted    COVID-19 (Rule Out) 22 COVID-19 (Ordered)   22 Rule-Out Test Resulted    COVID-19 (Rule Out) 21 COVID-19 (Ordered)   21 Melida Bergman RN    21 test result negative    COVID-19 (Rule Out) 21 COVID-19 (Ordered)   21 Rule-Out Test Resulted    COVID-19 (Rule Out) 11/15/21 11/15/21 11/15/21 COVID-19 (Ordered)   21 Rule-Out Test Resulted    COVID-19 (Rule Out) 10/19/21 10/19/21 10/19/21 COVID-19 (Ordered)   10/19/21 Rule-Out Test Resulted            Nurse Assessment:  Last Vital Signs: BP (!) 145/71   Pulse 54   Temp 97.7 °F (36.5 °C) (Oral)   Resp 17   Ht 5' 8\" (1.727 m)   Wt 229 lb 11.5 oz (104.2 kg)   SpO2 90%   BMI 34.93 kg/m²     Last documented pain score (0-10 scale): Pain Level: 0  Last Weight:   Wt Readings from Last 1 Encounters:   07/10/22 229 lb 11.5 oz (104.2 kg)     Mental Status:  {IP PT MENTAL STATUS:07413}    IV Access:  {[facility] IV ACCESS:147620047}    Nursing Mobility/ADLs:  Walking   {CHP DME URAK:470887270}  Transfer  {CHP DME VREO:819613925}  Bathing  {CHP DME VWUL:175030947}  Dressing  {CHP DME QVVJ:733340309}  Toileting  {CHP DME USQW:984205404}  Feeding  {[facility] DME WAJ}  Med Admin  {[facility] DME FDFD:553944636}  Med Delivery   {[facility] MED Delivery:956112538}    Wound Care Documentation and Therapy:  Negative Pressure Wound Therapy Leg Anterior;Left;Upper (Active)   Wound Type Surgical 22   Dressing Type Black Foam 22   Cycle Continuous 22   Target Pressure (mmHg) 125 07/14/22 0711   Canister changed? No 07/13/22 1926   Dressing Status Clean, dry & intact 07/14/22 0711   Drainage Amount None 07/14/22 0711   Drainage Description Serosanguinous 07/13/22 1926   Number of days: 0       Wound 07/10/22 Thigh Anterior; Left Swelling, tender, redness with expanding borders (Active)   Wound Etiology Other 07/11/22 0759   Dressing Status Clean 07/14/22 0711   Wound Cleansed Not Cleansed 07/13/22 1735   Dressing/Treatment Negative pressure wound therapy 07/13/22 1926   Wound Assessment Other (Comment) 07/13/22 1926   Drainage Amount Scant 07/13/22 1926   Drainage Description Serosanguinous 07/13/22 1926   Odor None 07/14/22 0711   Margins Other (Comment) 07/13/22 0834   Number of days: 3       Incision 03/10/22 Pretibial Right (Active)   Number of days: 126       Incision 03/10/22 Thigh Right; Inner (Active)   Number of days: 126       Incision 03/15/22 Leg Right (Active)   Number of days: 120        Elimination:  Continence: Bowel: {YES / DS:31047}  Bladder: {YES / MU:67945}  Urinary Catheter: {Urinary Catheter:756431156}   Colostomy/Ileostomy/Ileal Conduit: {YES / WJ:59404}       Date of Last BM: ***    Intake/Output Summary (Last 24 hours) at 7/14/2022 0936  Last data filed at 7/14/2022 0859  Gross per 24 hour   Intake 2140 ml   Output 0 ml   Net 2140 ml     I/O last 3 completed shifts:   In: Kiara Maldonado [P.O.:360; I.V.:1530]  Out: 0     Safety Concerns:     [employer] Safety Concerns:059252304}    Impairments/Disabilities:      [employer] Impairments/Disabilities:250855177}    Nutrition Therapy:  Current Nutrition Therapy:   [employer] Diet List:028171041}    Routes of Feeding: {CHP DME Other Feedings:169310354}  Liquids: {Slp liquid thickness:27585}  Daily Fluid Restriction: {CHP DME Yes amt example:847228832}  Last Modified Barium Swallow with Video (Video Swallowing Test): {Done Not Done TXYN:035995896}    Treatments at the Time of Hospital Discharge:   Respiratory Treatments: ***  Oxygen Therapy:  {Therapy; copd oxygen:36418}  Ventilator:    {MH CC Vent UQKC:470850685}    Rehab Therapies: {THERAPEUTIC INTERVENTION:1984813254}  Weight Bearing Status/Restrictions: [facility] CC Weight Bearin}  Other Medical Equipment (for information only, NOT a DME order):  {EQUIPMENT:777375337}  Other Treatments: ***    Patient's personal belongings (please select all that are sent with patient):  {CHP DME Belongings:452697630}    RN SIGNATURE:  {Esignature:031536170}    CASE MANAGEMENT/SOCIAL WORK SECTION    Inpatient Status Date: ***    Readmission Risk Assessment Score:  Readmission Risk              Risk of Unplanned Readmission:  14           Discharging to Facility/ Agency   Name:   Address:  Phone:  Fax:    Dialysis Facility (if applicable)   Name:  Address:  Dialysis Schedule:  Phone:  Fax:    / signature: {Esignature:653341044}    PHYSICIAN SECTION    Prognosis: Good    Condition at Discharge: Stable    Rehab Potential (if transferring to Rehab): Good    Recommended Labs or Other Treatments After Discharge:     Wound dressing change every 2 days     Physician Certification: I certify the above information and transfer of Addi Chan  is necessary for the continuing treatment of the diagnosis listed and that he requires [address] for less 30 days.      Update Admission H&P: No change in H&P    PHYSICIAN SIGNATURE:  Electronically signed by Kimberly Slater MD for Dr Christiane Claude, MD on 22 at 9:36 AM EDT

## 2022-08-01 ENCOUNTER — HOSPITAL ENCOUNTER (EMERGENCY)
Age: 67
Discharge: HOME | End: 2022-08-01
Attending: STUDENT IN AN ORGANIZED HEALTH CARE EDUCATION/TRAINING PROGRAM
Payer: MEDICARE

## 2022-08-01 VITALS
BODY MASS INDEX: 34.86 KG/M2 | WEIGHT: 230 LBS | OXYGEN SATURATION: 95 % | HEART RATE: 71 BPM | HEIGHT: 68 IN | DIASTOLIC BLOOD PRESSURE: 86 MMHG | SYSTOLIC BLOOD PRESSURE: 160 MMHG | TEMPERATURE: 98.7 F | RESPIRATION RATE: 16 BRPM

## 2022-08-01 DIAGNOSIS — I10 ASYMPTOMATIC HYPERTENSION: Primary | ICD-10-CM

## 2022-08-01 DIAGNOSIS — Z76.0 ENCOUNTER FOR MEDICATION REFILL: ICD-10-CM

## 2022-08-01 PROCEDURE — 99283 EMERGENCY DEPT VISIT LOW MDM: CPT

## 2022-08-01 PROCEDURE — 6370000000 HC RX 637 (ALT 250 FOR IP): Performed by: STUDENT IN AN ORGANIZED HEALTH CARE EDUCATION/TRAINING PROGRAM

## 2022-08-01 RX ORDER — METOPROLOL SUCCINATE 50 MG/1
25 TABLET, EXTENDED RELEASE ORAL ONCE
Status: COMPLETED | OUTPATIENT
Start: 2022-08-01 | End: 2022-08-01

## 2022-08-01 RX ORDER — METOPROLOL SUCCINATE 25 MG/1
25 TABLET, EXTENDED RELEASE ORAL DAILY
Qty: 30 TABLET | Refills: 0 | Status: SHIPPED | OUTPATIENT
Start: 2022-08-01 | End: 2022-08-31

## 2022-08-01 RX ORDER — LISINOPRIL 10 MG/1
5 TABLET ORAL ONCE
Status: COMPLETED | OUTPATIENT
Start: 2022-08-01 | End: 2022-08-01

## 2022-08-01 RX ORDER — LISINOPRIL 5 MG/1
5 TABLET ORAL DAILY
Qty: 30 TABLET | Refills: 0 | Status: SHIPPED | OUTPATIENT
Start: 2022-08-01 | End: 2022-08-31

## 2022-08-01 RX ADMIN — METOPROLOL SUCCINATE 25 MG: 50 TABLET, EXTENDED RELEASE ORAL at 13:38

## 2022-08-01 RX ADMIN — LISINOPRIL 5 MG: 10 TABLET ORAL at 13:37

## 2022-08-01 ASSESSMENT — PAIN - FUNCTIONAL ASSESSMENT: PAIN_FUNCTIONAL_ASSESSMENT: NONE - DENIES PAIN

## 2022-08-01 NOTE — ED PROVIDER NOTES
[facility]. [facility]  Hypertension (Was told by home health nurse /100, no symptoms. Pt states that he ran out of his bP meds.)     HISTORY OF PRESENT ILLNESS  Jeanice Holstein is a 78 y.o. male  who presents to the ED complaining of hypertension. Patient states that he recently had surgery on his left leg, had an infection that was being treated with a wound VAC so he has had a home health nurse coming to his house. She came today and took his blood pressure and found it to be 200/100. He denies any headache, numbness or tingling, chest pain or shortness of breath or any other symptoms. He states that he has a history of hypertension and is supposed to be taking hypertension medicines however he has not been taking them since his surgery. He denies any other complaints or concerns. No other complaints, modifying factors or associated symptoms. I have reviewed the following from the nursing documentation.     Past Medical History:   Diagnosis Date    CAD (coronary artery disease)     Hyperlipidemia     Hypertension     No longer taking meds    Thyroid disease     No longer taking meds    Wears dentures     upper     Past Surgical History:   Procedure Laterality Date    ARTERIAL BYPASS SURGRY      triple in 2009    CARDIAC SURGERY  2011    CABG x3 vessel    FEMORAL BYPASS Left 12/29/2021    LEFT FEMORAL POPLITEAL BYPASS performed by Nora Selby MD at [address] [place] [address] Right 3/10/2022    RIGHT BELOW THE KNEE POPLITEAL INSITU BYPASS GRAFT performed by Nora Selby MD at [place] [address] Left 7/13/2022    LEFT THIGH ABSCESS WASHOUT WITH WOUND VAC PLACEMENT performed by Juan G[facility]mez MD at [facility] Right 3/15/2022    WOUND IRRIGATION AND CLOSURE RIGHT LEG SURGICAL WOUND performed by Nora Selby MD at [facility] History   Problem Relation Age of Onset    Heart Disease Mother     Stroke Father      Social History     Socioeconomic History    Marital status:      Spouse name: Not on file    Number of children: Not on file    Years of education: Not on file    Highest education level: Not on file   Occupational History    Not on file   Tobacco Use    Smoking status: Every Day     Packs/day: 1.00     Types: Cigarettes    Smokeless tobacco: Never    Tobacco comments:     3/7/22 -  now smoking 3-7 cigs/day   Vaping Use    Vaping Use: Never used   Substance and Sexual Activity    Alcohol use: No    Drug use: Not Currently     Types: Marijuana Lindquist Shobha)     Comment: hasn't smoked in a few months    Sexual activity: Not on file   Other Topics Concern    Not on file   Social History Narrative    Not on file     Social Determinants of Health     Financial Resource Strain: Not on file   Food Insecurity: Not on file   Transportation Needs: Not on file   Physical Activity: Not on file   Stress: Not on file   Social Connections: Not on file   Intimate Partner Violence: Not on file   Housing Stability: Not on file     No current facility-administered medications for this encounter. Current Outpatient Medications   Medication Sig Dispense Refill    lisinopril (PRINIVIL;ZESTRIL) 5 MG tablet Take 1 tablet by mouth in the morning. 30 tablet 0    metoprolol succinate (TOPROL XL) 25 MG extended release tablet Take 1 tablet by mouth in the morning. 30 tablet 0    metFORMIN (GLUCOPHAGE) 500 MG tablet Take 1 tablet by mouth 2 times daily (with meals) 180 tablet 1    levothyroxine (SYNTHROID) 112 MCG tablet       rosuvastatin (CRESTOR) 20 MG tablet Take 1 tablet by mouth daily 90 tablet 1    aspirin 81 MG chewable tablet Take 81 mg by mouth daily. No Known Allergies    REVIEW OF SYSTEMS  10 systems reviewed, pertinent positives per HPI otherwise noted to be negative.     PHYSICAL EXAM  BP (!) 160/86   Pulse 71   Temp 98.7 °F (37.1 °C) (Oral)   Resp 16   Ht 5' 8\" (1.727 m)   Wt 230 lb (104.3 kg)   SpO2 95%   BMI 34.97 kg/m²    GENERAL APPEARANCE: SEP-1 Core Measure due to: Infection is not suspected     During the patient's ED course, the patient was given:  Medications   lisinopril (PRINIVIL;ZESTRIL) tablet 5 mg (5 mg Oral Given 8/1/22 1337)   metoprolol succinate (TOPROL XL) extended release tablet 25 mg (25 mg Oral Given 8/1/22 1338)        CLINICAL IMPRESSION  1. Asymptomatic hypertension    2. Encounter for medication refill        Blood pressure (!) 160/86, pulse 71, temperature 98.7 °F (37.1 °C), temperature source Oral, resp. rate 16, height 5' 8\" (1.727 m), weight 230 lb (104.3 kg), SpO2 95 %. DISPOSITION  Júnior Lewis was discharged to home in good condition. Patient was given scripts for the following medications. I counseled patient how to take these medications. Discharge Medication List as of 8/1/2022  1:58 PM          Follow-up with:  Gracia Parks, MARCEL - CNP  [facility]  [phone]    Go in 1 day      [place] Emergency Department  [address] [place] [address],[address]  [phone]  Go to   If symptoms worsen    DISCLAIMER: This chart was created using Dragon dictation software. Efforts were made by me to ensure accuracy, however some errors may be present due to limitations of this technology and occasionally words are not transcribed correctly.        Delvin Vaz MD  08/01/22 0694

## 2022-08-05 ENCOUNTER — OFFICE VISIT (OUTPATIENT)
Dept: VASCULAR SURGERY | Age: 67
End: 2022-08-05

## 2022-08-05 VITALS
HEIGHT: 68 IN | WEIGHT: 230 LBS | BODY MASS INDEX: 34.86 KG/M2 | DIASTOLIC BLOOD PRESSURE: 82 MMHG | SYSTOLIC BLOOD PRESSURE: 168 MMHG

## 2022-08-05 DIAGNOSIS — I73.9 PVD (PERIPHERAL VASCULAR DISEASE) (HCC): ICD-10-CM

## 2022-08-05 DIAGNOSIS — Z09 POSTOPERATIVE EXAMINATION: ICD-10-CM

## 2022-08-05 DIAGNOSIS — Z95.828 H/O EXTREMITY BYPASS GRAFT: Primary | ICD-10-CM

## 2022-08-05 PROCEDURE — 99024 POSTOP FOLLOW-UP VISIT: CPT | Performed by: SURGERY

## 2022-08-05 NOTE — PROGRESS NOTES
Postop Progress Note    Subjective    Stevo Bumps presents to the office for postop follow up. Wound VAC changes stopped. Wet to dry dressing  being done. Objective    Vitals:    08/05/22 1427   BP: (!) 168/82     General: alert, cooperative and no distress  Incision: healing well Good granulation in groin. Palpable bilateral graft pulses. Assessment  Doing well postoperatively. Plan  Continue dressing changes. F/u 4 weeks.      Electronically signed by Elias Roe MD on 8/5/2022 at 2:53 PM

## 2022-09-06 ENCOUNTER — TELEPHONE (OUTPATIENT)
Dept: VASCULAR SURGERY | Age: 67
End: 2022-09-06

## 2022-09-06 NOTE — TELEPHONE ENCOUNTER
Called patient as he needs to reschedule his graft duplex scan as he no showed for the one schedule on 9/2/2022. Spoke with spouse and she is letting him know to schedule.  Thaddeus

## 2022-10-06 ENCOUNTER — TELEPHONE (OUTPATIENT)
Dept: VASCULAR SURGERY | Age: 67
End: 2022-10-06

## 2022-10-06 NOTE — TELEPHONE ENCOUNTER
Called patient regarding getting his graft duplex scan. He no showed on 9/2/2022 and I called him on 9/6/2022 to reschedule. He still has not called me back. angelica

## 2022-11-08 ENCOUNTER — HOSPITAL ENCOUNTER (OUTPATIENT)
Age: 67
Discharge: HOME | End: 2022-11-08
Payer: MEDICARE

## 2022-11-08 ENCOUNTER — HOSPITAL ENCOUNTER (OUTPATIENT)
Dept: GENERAL RADIOLOGY | Age: 67
Discharge: HOME | End: 2022-11-08
Payer: MEDICARE

## 2022-11-08 DIAGNOSIS — R05.3 CHRONIC COUGH: ICD-10-CM

## 2022-11-08 PROCEDURE — 71046 X-RAY EXAM CHEST 2 VIEWS: CPT

## 2023-12-30 ENCOUNTER — HOSPITAL ENCOUNTER (INPATIENT)
Age: 68
LOS: 1 days | Discharge: LEFT BEFORE DISCHARGE | DRG: 313 | End: 2023-12-31
Attending: STUDENT IN AN ORGANIZED HEALTH CARE EDUCATION/TRAINING PROGRAM | Admitting: STUDENT IN AN ORGANIZED HEALTH CARE EDUCATION/TRAINING PROGRAM
Payer: MEDICARE

## 2023-12-30 ENCOUNTER — APPOINTMENT (OUTPATIENT)
Dept: GENERAL RADIOLOGY | Age: 68
DRG: 313 | End: 2023-12-30
Payer: MEDICARE

## 2023-12-30 DIAGNOSIS — Z86.79 HX OF CORONARY ARTERY DISEASE: ICD-10-CM

## 2023-12-30 DIAGNOSIS — R07.9 CHEST PAIN, UNSPECIFIED TYPE: Primary | ICD-10-CM

## 2023-12-30 DIAGNOSIS — Z95.1 S/P CABG (CORONARY ARTERY BYPASS GRAFT): ICD-10-CM

## 2023-12-30 LAB
ALBUMIN SERPL-MCNC: 3.9 G/DL (ref 3.4–5)
ALBUMIN/GLOB SERPL: 1.3 {RATIO} (ref 1.1–2.2)
ALP SERPL-CCNC: 64 U/L (ref 40–129)
ALT SERPL-CCNC: 8 U/L (ref 10–40)
ANION GAP SERPL CALCULATED.3IONS-SCNC: 9 MMOL/L (ref 3–16)
AST SERPL-CCNC: 9 U/L (ref 15–37)
BASOPHILS # BLD: 0.1 K/UL (ref 0–0.2)
BASOPHILS NFR BLD: 1.4 %
BILIRUB SERPL-MCNC: <0.2 MG/DL (ref 0–1)
BUN SERPL-MCNC: 16 MG/DL (ref 7–20)
CALCIUM SERPL-MCNC: 9 MG/DL (ref 8.3–10.6)
CHLORIDE SERPL-SCNC: 102 MMOL/L (ref 99–110)
CO2 SERPL-SCNC: 28 MMOL/L (ref 21–32)
CREAT SERPL-MCNC: 0.6 MG/DL (ref 0.8–1.3)
DEPRECATED RDW RBC AUTO: 15.1 % (ref 12.4–15.4)
EOSINOPHIL # BLD: 0.2 K/UL (ref 0–0.6)
EOSINOPHIL NFR BLD: 2.5 %
GFR SERPLBLD CREATININE-BSD FMLA CKD-EPI: >60 ML/MIN/{1.73_M2}
GLUCOSE BLD-MCNC: 128 MG/DL (ref 70–99)
GLUCOSE SERPL-MCNC: 126 MG/DL (ref 70–99)
HCT VFR BLD AUTO: 43.5 % (ref 40.5–52.5)
HGB BLD-MCNC: 14.4 G/DL (ref 13.5–17.5)
LIPASE SERPL-CCNC: 17 U/L (ref 13–60)
LYMPHOCYTES # BLD: 2.7 K/UL (ref 1–5.1)
LYMPHOCYTES NFR BLD: 38.9 %
MAGNESIUM SERPL-MCNC: 1.9 MG/DL (ref 1.8–2.4)
MCH RBC QN AUTO: 30.9 PG (ref 26–34)
MCHC RBC AUTO-ENTMCNC: 33.1 G/DL (ref 31–36)
MCV RBC AUTO: 93.2 FL (ref 80–100)
MONOCYTES # BLD: 0.6 K/UL (ref 0–1.3)
MONOCYTES NFR BLD: 9 %
NEUTROPHILS # BLD: 3.4 K/UL (ref 1.7–7.7)
NEUTROPHILS NFR BLD: 48.2 %
NT-PROBNP SERPL-MCNC: 1589 PG/ML (ref 0–124)
PERFORMED ON: ABNORMAL
PLATELET # BLD AUTO: 235 K/UL (ref 135–450)
PMV BLD AUTO: 6.7 FL (ref 5–10.5)
POTASSIUM SERPL-SCNC: 4 MMOL/L (ref 3.5–5.1)
PROT SERPL-MCNC: 7 G/DL (ref 6.4–8.2)
RBC # BLD AUTO: 4.67 M/UL (ref 4.2–5.9)
SODIUM SERPL-SCNC: 139 MMOL/L (ref 136–145)
TROPONIN, HIGH SENSITIVITY: 16 NG/L (ref 0–22)
TROPONIN, HIGH SENSITIVITY: 67 NG/L (ref 0–22)
WBC # BLD AUTO: 7 K/UL (ref 4–11)

## 2023-12-30 PROCEDURE — 2580000003 HC RX 258: Performed by: NURSE PRACTITIONER

## 2023-12-30 PROCEDURE — 6360000002 HC RX W HCPCS: Performed by: STUDENT IN AN ORGANIZED HEALTH CARE EDUCATION/TRAINING PROGRAM

## 2023-12-30 PROCEDURE — 83735 ASSAY OF MAGNESIUM: CPT

## 2023-12-30 PROCEDURE — 83690 ASSAY OF LIPASE: CPT

## 2023-12-30 PROCEDURE — 99285 EMERGENCY DEPT VISIT HI MDM: CPT

## 2023-12-30 PROCEDURE — 2060000000 HC ICU INTERMEDIATE R&B

## 2023-12-30 PROCEDURE — 83880 ASSAY OF NATRIURETIC PEPTIDE: CPT

## 2023-12-30 PROCEDURE — 85025 COMPLETE CBC W/AUTO DIFF WBC: CPT

## 2023-12-30 PROCEDURE — 84484 ASSAY OF TROPONIN QUANT: CPT

## 2023-12-30 PROCEDURE — 96365 THER/PROPH/DIAG IV INF INIT: CPT

## 2023-12-30 PROCEDURE — 71045 X-RAY EXAM CHEST 1 VIEW: CPT

## 2023-12-30 PROCEDURE — 93005 ELECTROCARDIOGRAM TRACING: CPT | Performed by: STUDENT IN AN ORGANIZED HEALTH CARE EDUCATION/TRAINING PROGRAM

## 2023-12-30 PROCEDURE — 6370000000 HC RX 637 (ALT 250 FOR IP): Performed by: STUDENT IN AN ORGANIZED HEALTH CARE EDUCATION/TRAINING PROGRAM

## 2023-12-30 PROCEDURE — 80053 COMPREHEN METABOLIC PANEL: CPT

## 2023-12-30 RX ORDER — POTASSIUM CHLORIDE 20 MEQ/1
40 TABLET, EXTENDED RELEASE ORAL PRN
Status: DISCONTINUED | OUTPATIENT
Start: 2023-12-30 | End: 2023-12-31 | Stop reason: HOSPADM

## 2023-12-30 RX ORDER — POLYETHYLENE GLYCOL 3350 17 G/17G
17 POWDER, FOR SOLUTION ORAL DAILY PRN
Status: DISCONTINUED | OUTPATIENT
Start: 2023-12-30 | End: 2023-12-31 | Stop reason: HOSPADM

## 2023-12-30 RX ORDER — ACETAMINOPHEN 650 MG/1
650 SUPPOSITORY RECTAL EVERY 6 HOURS PRN
Status: DISCONTINUED | OUTPATIENT
Start: 2023-12-30 | End: 2023-12-31 | Stop reason: HOSPADM

## 2023-12-30 RX ORDER — LEVOTHYROXINE SODIUM 112 UG/1
112 TABLET ORAL DAILY
Status: DISCONTINUED | OUTPATIENT
Start: 2023-12-31 | End: 2023-12-30

## 2023-12-30 RX ORDER — HEPARIN SODIUM 10000 [USP'U]/100ML
1430 INJECTION, SOLUTION INTRAVENOUS CONTINUOUS
Status: DISCONTINUED | OUTPATIENT
Start: 2023-12-30 | End: 2023-12-31 | Stop reason: HOSPADM

## 2023-12-30 RX ORDER — POTASSIUM CHLORIDE 7.45 MG/ML
10 INJECTION INTRAVENOUS PRN
Status: DISCONTINUED | OUTPATIENT
Start: 2023-12-30 | End: 2023-12-31 | Stop reason: HOSPADM

## 2023-12-30 RX ORDER — DEXTROSE MONOHYDRATE 100 MG/ML
INJECTION, SOLUTION INTRAVENOUS CONTINUOUS PRN
Status: DISCONTINUED | OUTPATIENT
Start: 2023-12-30 | End: 2023-12-31 | Stop reason: HOSPADM

## 2023-12-30 RX ORDER — ROSUVASTATIN CALCIUM 10 MG/1
40 TABLET, COATED ORAL DAILY
Status: DISCONTINUED | OUTPATIENT
Start: 2023-12-31 | End: 2023-12-31 | Stop reason: HOSPADM

## 2023-12-30 RX ORDER — HEPARIN SODIUM 1000 [USP'U]/ML
2000 INJECTION, SOLUTION INTRAVENOUS; SUBCUTANEOUS PRN
Status: DISCONTINUED | OUTPATIENT
Start: 2023-12-30 | End: 2023-12-31 | Stop reason: HOSPADM

## 2023-12-30 RX ORDER — INSULIN LISPRO 100 [IU]/ML
0-4 INJECTION, SOLUTION INTRAVENOUS; SUBCUTANEOUS
Status: DISCONTINUED | OUTPATIENT
Start: 2023-12-31 | End: 2023-12-31 | Stop reason: HOSPADM

## 2023-12-30 RX ORDER — INSULIN LISPRO 100 [IU]/ML
0-4 INJECTION, SOLUTION INTRAVENOUS; SUBCUTANEOUS NIGHTLY
Status: DISCONTINUED | OUTPATIENT
Start: 2023-12-30 | End: 2023-12-31 | Stop reason: HOSPADM

## 2023-12-30 RX ORDER — LOSARTAN POTASSIUM 100 MG/1
100 TABLET ORAL DAILY
COMMUNITY
Start: 2023-11-10

## 2023-12-30 RX ORDER — LOSARTAN POTASSIUM 100 MG/1
100 TABLET ORAL DAILY
Status: DISCONTINUED | OUTPATIENT
Start: 2023-12-31 | End: 2023-12-31 | Stop reason: HOSPADM

## 2023-12-30 RX ORDER — NICOTINE 21 MG/24HR
1 PATCH, TRANSDERMAL 24 HOURS TRANSDERMAL DAILY
Status: DISCONTINUED | OUTPATIENT
Start: 2023-12-30 | End: 2023-12-31 | Stop reason: HOSPADM

## 2023-12-30 RX ORDER — SODIUM CHLORIDE 0.9 % (FLUSH) 0.9 %
5-40 SYRINGE (ML) INJECTION EVERY 12 HOURS SCHEDULED
Status: DISCONTINUED | OUTPATIENT
Start: 2023-12-30 | End: 2023-12-31 | Stop reason: HOSPADM

## 2023-12-30 RX ORDER — HEPARIN SODIUM 1000 [USP'U]/ML
4000 INJECTION, SOLUTION INTRAVENOUS; SUBCUTANEOUS PRN
Status: DISCONTINUED | OUTPATIENT
Start: 2023-12-30 | End: 2023-12-31 | Stop reason: HOSPADM

## 2023-12-30 RX ORDER — ACETAMINOPHEN 325 MG/1
650 TABLET ORAL EVERY 6 HOURS PRN
Status: DISCONTINUED | OUTPATIENT
Start: 2023-12-30 | End: 2023-12-31 | Stop reason: HOSPADM

## 2023-12-30 RX ORDER — SODIUM CHLORIDE 9 MG/ML
INJECTION, SOLUTION INTRAVENOUS PRN
Status: DISCONTINUED | OUTPATIENT
Start: 2023-12-30 | End: 2023-12-31 | Stop reason: HOSPADM

## 2023-12-30 RX ORDER — HEPARIN SODIUM 1000 [USP'U]/ML
4000 INJECTION, SOLUTION INTRAVENOUS; SUBCUTANEOUS ONCE
Status: COMPLETED | OUTPATIENT
Start: 2023-12-30 | End: 2023-12-30

## 2023-12-30 RX ORDER — PROCHLORPERAZINE EDISYLATE 5 MG/ML
10 INJECTION INTRAMUSCULAR; INTRAVENOUS EVERY 6 HOURS PRN
Status: DISCONTINUED | OUTPATIENT
Start: 2023-12-30 | End: 2023-12-31 | Stop reason: HOSPADM

## 2023-12-30 RX ORDER — MAGNESIUM SULFATE IN WATER 40 MG/ML
2000 INJECTION, SOLUTION INTRAVENOUS PRN
Status: DISCONTINUED | OUTPATIENT
Start: 2023-12-30 | End: 2023-12-31 | Stop reason: HOSPADM

## 2023-12-30 RX ORDER — LABETALOL HYDROCHLORIDE 5 MG/ML
10 INJECTION, SOLUTION INTRAVENOUS EVERY 4 HOURS PRN
Status: DISCONTINUED | OUTPATIENT
Start: 2023-12-30 | End: 2023-12-31

## 2023-12-30 RX ORDER — ASPIRIN 81 MG/1
81 TABLET, CHEWABLE ORAL DAILY
Status: DISCONTINUED | OUTPATIENT
Start: 2023-12-31 | End: 2023-12-31 | Stop reason: HOSPADM

## 2023-12-30 RX ORDER — EMPAGLIFLOZIN 10 MG/1
10 TABLET, FILM COATED ORAL DAILY
COMMUNITY
Start: 2023-07-05

## 2023-12-30 RX ORDER — SODIUM CHLORIDE 0.9 % (FLUSH) 0.9 %
5-40 SYRINGE (ML) INJECTION PRN
Status: DISCONTINUED | OUTPATIENT
Start: 2023-12-30 | End: 2023-12-31 | Stop reason: HOSPADM

## 2023-12-30 RX ADMIN — HEPARIN SODIUM 4000 UNITS: 1000 INJECTION INTRAVENOUS; SUBCUTANEOUS at 19:08

## 2023-12-30 RX ADMIN — NITROGLYCERIN 0.5 INCH: 20 OINTMENT TOPICAL at 19:07

## 2023-12-30 RX ADMIN — SODIUM CHLORIDE, PRESERVATIVE FREE 10 ML: 5 INJECTION INTRAVENOUS at 21:24

## 2023-12-30 RX ADMIN — HEPARIN SODIUM 950 UNITS/HR: 10000 INJECTION, SOLUTION INTRAVENOUS at 19:14

## 2023-12-30 ASSESSMENT — PAIN SCALES - GENERAL
PAINLEVEL_OUTOF10: 4
PAINLEVEL_OUTOF10: 4

## 2023-12-30 ASSESSMENT — PAIN DESCRIPTION - LOCATION: LOCATION: CHEST

## 2023-12-30 ASSESSMENT — PAIN - FUNCTIONAL ASSESSMENT: PAIN_FUNCTIONAL_ASSESSMENT: 0-10

## 2023-12-30 NOTE — CONSULTS
Pharmacy to Manage Heparin Infusion per Hospital Policy    Dx: CAD/ACS  Pt wt = 95.3 kg (will use adjusted wt =79 kg).  Baseline aPTT = __ at __    Oral factor Xa-inhibitors may alter and elevate anti-Xa levels used for unfractionated heparin monitoring. As a result, anti-Xa monitoring is not accurate while Xa-inhibitor activity is detectable. Utilize aPTT monitoring when patient received an oral factor Xa-inhibitor (apixaban, betrixaban, edoxaban or rivaroxaban) within 72 hours prior to admission (please document last administration time). The goal is to allow a washout of oral factor Xa-inhibitors by using aPTT for 72 hours, then change to ant-Xa levels for UFH.     No oral anticoagulant use, ok for anti-Xa monitoring    Heparin (weight-based) Infusion: CAD/STEMI/NSTEMI/UA/AFib)   Heparin 60 units/kg IVP bolus (max 4,000 units) followed by Heparin infusion at 12 units/kg/hr ( initial rate: 950 units/hr).  Recheck anti-Xa (unless aPTT being used) in 6 hours.  Goal anti-Xa 0.3-0.7 IU/mL  Goal aPTT =  seconds.    Justin Santo, PharmD 6:46 PM EST 12/30/23 12/31/23 at 0111  Anti-Xa: <1.1  Plan: Give 4000 units of heparin as bolus, increase heparin infusion rate to 1270 units/hr, recheck anti-Xa at 0800  Justin Haq PharmD 12/31/2023  2:06 AM    12/31/2023 at 0819  Anti-Xa Unfrac Heparin   0.28   IU/mL  - Heparin _2000_ units IVP bolus and then increase Heparin infusion to _1430_ units/hr.   - Recheck Anti-Xa in 6 hours at 1500.   Tim Naranjo, PharmD    12/31/2023 8:54 AM  ______________________________________________________________

## 2023-12-30 NOTE — ED PROVIDER NOTES
unspecified type    2. Hx of coronary artery disease    3. S/P CABG (coronary artery bypass graft)          Disposition:  Admit to telemetry in stable condition.    Blood pressure (!) 155/92, pulse (!) 103, resp. rate 20, height 1.727 m (5' 8\"), weight 95.3 kg (210 lb), SpO2 95 %.    Patient was given scripts for the following medications. I counseled patient how to take these medications.   New Prescriptions    No medications on file       Disposition referral (if applicable):  No follow-up provider specified.      Total critical care time is 33 minutes, which excludes separately billable procedures and updating family. Time spent is specifically for management of the presenting complaint and symptoms initially, direct bedside care, reevaluation, review of records, and consultation.  There was a high probability of clinically significant life-threatening deterioration in the patient's condition, which required my urgent intervention.     This chart was generated in part by using Dragon Dictation system and may contain errors related to that system including errors in grammar, punctuation, and spelling, as well as words and phrases that may be inappropriate. If there are any questions or concerns please feel free to contact the dictating provider for clarification.     Azul Bautista MD   Acute Care [facility]        Azul Bautista MD  12/30/23 2025

## 2023-12-31 VITALS
DIASTOLIC BLOOD PRESSURE: 70 MMHG | TEMPERATURE: 97.4 F | HEART RATE: 51 BPM | SYSTOLIC BLOOD PRESSURE: 147 MMHG | WEIGHT: 205.8 LBS | HEIGHT: 68 IN | OXYGEN SATURATION: 95 % | BODY MASS INDEX: 31.19 KG/M2 | RESPIRATION RATE: 16 BRPM

## 2023-12-31 LAB
ANION GAP SERPL CALCULATED.3IONS-SCNC: 8 MMOL/L (ref 3–16)
ANTI-XA UNFRAC HEPARIN: 0.28 IU/ML (ref 0.3–0.7)
ANTI-XA UNFRAC HEPARIN: <0.1 IU/ML (ref 0.3–0.7)
BUN SERPL-MCNC: 16 MG/DL (ref 7–20)
CALCIUM SERPL-MCNC: 9 MG/DL (ref 8.3–10.6)
CHLORIDE SERPL-SCNC: 101 MMOL/L (ref 99–110)
CHOLEST SERPL-MCNC: 113 MG/DL (ref 0–199)
CO2 SERPL-SCNC: 28 MMOL/L (ref 21–32)
CREAT SERPL-MCNC: 0.7 MG/DL (ref 0.8–1.3)
DEPRECATED RDW RBC AUTO: 14.9 % (ref 12.4–15.4)
EKG ATRIAL RATE: 101 BPM
EKG ATRIAL RATE: 44 BPM
EKG DIAGNOSIS: NORMAL
EKG DIAGNOSIS: NORMAL
EKG P-R INTERVAL: 190 MS
EKG Q-T INTERVAL: 358 MS
EKG Q-T INTERVAL: 482 MS
EKG QRS DURATION: 118 MS
EKG QRS DURATION: 124 MS
EKG QTC CALCULATION (BAZETT): 421 MS
EKG QTC CALCULATION (BAZETT): 464 MS
EKG R AXIS: -15 DEGREES
EKG R AXIS: -33 DEGREES
EKG T AXIS: 117 DEGREES
EKG T AXIS: 134 DEGREES
EKG VENTRICULAR RATE: 101 BPM
EKG VENTRICULAR RATE: 46 BPM
GFR SERPLBLD CREATININE-BSD FMLA CKD-EPI: >60 ML/MIN/{1.73_M2}
GLUCOSE SERPL-MCNC: 121 MG/DL (ref 70–99)
HCT VFR BLD AUTO: 40.8 % (ref 40.5–52.5)
HDLC SERPL-MCNC: 38 MG/DL (ref 40–60)
HGB BLD-MCNC: 13.8 G/DL (ref 13.5–17.5)
LDLC SERPL CALC-MCNC: 57 MG/DL
MAGNESIUM SERPL-MCNC: 1.9 MG/DL (ref 1.8–2.4)
MCH RBC QN AUTO: 31.2 PG (ref 26–34)
MCHC RBC AUTO-ENTMCNC: 33.8 G/DL (ref 31–36)
MCV RBC AUTO: 92.4 FL (ref 80–100)
PLATELET # BLD AUTO: 221 K/UL (ref 135–450)
PMV BLD AUTO: 7.2 FL (ref 5–10.5)
POTASSIUM SERPL-SCNC: 4 MMOL/L (ref 3.5–5.1)
RBC # BLD AUTO: 4.42 M/UL (ref 4.2–5.9)
SODIUM SERPL-SCNC: 137 MMOL/L (ref 136–145)
TRIGL SERPL-MCNC: 90 MG/DL (ref 0–150)
TROPONIN, HIGH SENSITIVITY: 157 NG/L (ref 0–22)
TROPONIN, HIGH SENSITIVITY: 402 NG/L (ref 0–22)
TROPONIN, HIGH SENSITIVITY: 455 NG/L (ref 0–22)
TROPONIN, HIGH SENSITIVITY: 501 NG/L (ref 0–22)
TSH SERPL DL<=0.005 MIU/L-ACNC: 2.01 UIU/ML (ref 0.27–4.2)
VLDLC SERPL CALC-MCNC: 18 MG/DL
WBC # BLD AUTO: 6.8 K/UL (ref 4–11)

## 2023-12-31 PROCEDURE — 83036 HEMOGLOBIN GLYCOSYLATED A1C: CPT

## 2023-12-31 PROCEDURE — 93005 ELECTROCARDIOGRAM TRACING: CPT | Performed by: NURSE PRACTITIONER

## 2023-12-31 PROCEDURE — 93010 ELECTROCARDIOGRAM REPORT: CPT | Performed by: INTERNAL MEDICINE

## 2023-12-31 PROCEDURE — 6370000000 HC RX 637 (ALT 250 FOR IP): Performed by: NURSE PRACTITIONER

## 2023-12-31 PROCEDURE — 6360000002 HC RX W HCPCS: Performed by: INTERNAL MEDICINE

## 2023-12-31 PROCEDURE — 80061 LIPID PANEL: CPT

## 2023-12-31 PROCEDURE — 6360000002 HC RX W HCPCS: Performed by: NURSE PRACTITIONER

## 2023-12-31 PROCEDURE — 80048 BASIC METABOLIC PNL TOTAL CA: CPT

## 2023-12-31 PROCEDURE — 84484 ASSAY OF TROPONIN QUANT: CPT

## 2023-12-31 PROCEDURE — 85027 COMPLETE CBC AUTOMATED: CPT

## 2023-12-31 PROCEDURE — 84443 ASSAY THYROID STIM HORMONE: CPT

## 2023-12-31 PROCEDURE — 85520 HEPARIN ASSAY: CPT

## 2023-12-31 PROCEDURE — 83735 ASSAY OF MAGNESIUM: CPT

## 2023-12-31 PROCEDURE — 6370000000 HC RX 637 (ALT 250 FOR IP): Performed by: INTERNAL MEDICINE

## 2023-12-31 PROCEDURE — 36415 COLL VENOUS BLD VENIPUNCTURE: CPT

## 2023-12-31 RX ORDER — HEPARIN SODIUM 1000 [USP'U]/ML
4000 INJECTION, SOLUTION INTRAVENOUS; SUBCUTANEOUS ONCE
Status: COMPLETED | OUTPATIENT
Start: 2023-12-31 | End: 2023-12-31

## 2023-12-31 RX ORDER — HEPARIN SODIUM 1000 [USP'U]/ML
2000 INJECTION, SOLUTION INTRAVENOUS; SUBCUTANEOUS ONCE
Status: COMPLETED | OUTPATIENT
Start: 2023-12-31 | End: 2023-12-31

## 2023-12-31 RX ADMIN — ASPIRIN 81 MG: 81 TABLET, CHEWABLE ORAL at 09:56

## 2023-12-31 RX ADMIN — HEPARIN SODIUM 2000 UNITS: 1000 INJECTION INTRAVENOUS; SUBCUTANEOUS at 09:57

## 2023-12-31 RX ADMIN — LEVOTHYROXINE SODIUM 137 MCG: 0.03 TABLET ORAL at 06:51

## 2023-12-31 RX ADMIN — LOSARTAN POTASSIUM 100 MG: 100 TABLET, FILM COATED ORAL at 09:56

## 2023-12-31 RX ADMIN — HEPARIN SODIUM 4000 UNITS: 1000 INJECTION, SOLUTION INTRAVENOUS; SUBCUTANEOUS at 02:48

## 2023-12-31 RX ADMIN — ROSUVASTATIN 40 MG: 10 TABLET, FILM COATED ORAL at 09:56

## 2023-12-31 NOTE — H&P
Hospital Medicine History & Physical        Date of Service: 12/30/2023    Time of Service: 1940    Disposition:    [x]Admitted to inpatient status with expected LOS greater than two midnights due to medical therapy.  []Placed in observation status.    Historian: Self/patient, chart review, Care Everywhere, ED documentation    Chief Admission Complaint:  Chest Pain    Presenting Admission History:      Simone Gomes is a/an 68 y.o. male with a significant past medical history of hypertension, hyperlipidemia, CAD status post three-vessel bypass in 2011, systolic heart failure with last known LVEF 35%, and type 2 diabetes who presents to [facility] part with complaint of acute onset substernal chest pain, located mostly midline with radiation bilaterally along the edges of the sternum, rated 10 out of 10 initially, felt like a sharp pressure, with no associated palpitations, dizziness, diaphoresis.  States the pain woke him up out of his sleep. Pain did improve to 2/10 after nitroglycerin topical in ED. His evaluation included laboratory studies, EKG, and chest x-ray.  Chest x-ray showed central pulmonary vasculature prominence possible congestion, stable enlarged cardiac silhouette.  EKG was nonacute.  Pertinent abnormal lab values include BNP 1589, initial troponin 16 with repeat of 67 and up to 157.  Cardiology was consulted by the ED, recommended heparin infusion, no evidence stating urgent catheterization.  Hospital team was consulted to admit for NSTEMI.    Assessment/Plan:      Current Principal Problem:  Chest pain    NSTEMI/CAD s/p CABG  - Admit to PCU  - Continue heparin infusion, pharmacy to co-manage  - Cardiology consulted by ED, will eval in AM  - Check FLP in AM  - Continue losartan, ASA, rosuvastatin    Chronic Systolic HF  - Elevated BNP on admit labs, CXR evidence of congestion  - Repeat echocardiogram in AM, last known LVEF in 2020 35%  - CHF order set in place  - Both ACEi

## 2023-12-31 NOTE — DISCHARGE SUMMARY
AMA Summary - progress note for today    Name:  Simone Gomes /Age/Sex: 1955 (68 y.o. male)   Admit Date: 2023  Discharge Date: 23   MRN & CSN:  [id number] & [id number] Encounter Date and Time 23 11:20 AM EST    Attending:  Stalin Horton MD Discharging Provider: STALIN HORTON MD       Hospital Course:       68 Y M with a h/o CABG years ago presented last night with chest pain.  EKG had some concerning ST elevation with reciprocal depressions.  ED physician discussed with cardiology.  Trop went from 16 to 500 in twelve hours and cardiology was updated.  I met him this morning when the RN paged me that he was leaving AMA.  I told him in plain terms that he is \"having a heart attack\" and that \"people die from complications of this all the time.\"  He demonstrated understanding, still wanted to leave because he was \"tired of waiting around.\"  I politely informed him that this seemed like a big mistake.  I asked if I could update his family, and he refused, saying that he would inform them.  I asked him not to drive for at least a couple weeks in case he has an arrhythmia.      Continue his same meds for HTN, DM2, chronic systolic CHF, hypothyroidism.       Discharge Diagnosis:   Chest pain      Discharge Instructions:     Go to the nearest emergency room if you change your mind.  At least take aspirin daily.    Diet: Diet NPO Exceptions are: Sips of Water with Meds      Objective Findings at Discharge:   BP (!) 147/70   Pulse 51   Temp 97.4 °F (36.3 °C) (Oral)   Resp 16   Ht 1.727 m (5' 8\")   Wt 93.4 kg (205 lb 12.8 oz)   SpO2 95%   BMI 31.29 kg/m²       Physical Exam:     Patient refused exam.  He did not appear to be in distress, was breathing easily, didn't look edematous.  Oppositional.     Discharge Medications:        Medication List        ASK your doctor about these medications      aspirin 81 MG chewable tablet     Jardiance 10 MG tablet  Generic drug:  TRIG 100 11/18/2021 06:47 AM     Hemoglobin A1C:   Lab Results   Component Value Date/Time    LABA1C 8.0 07/12/2022 05:50 AM     TSH: No results found for: \"TSH\"  Troponin: No results found for: \"TROPONINT\"  Lactic Acid: No results for input(s): \"LACTA\" in the last 72 hours.  BNP:   Recent Labs     12/30/23  1830   PROBNP 1,589*     UA:No results found for: \"NITRU\", \"COLORU\", \"PHUR\", \"LABCAST\", \"WBCUA\", \"RBCUA\", \"MUCUS\", \"TRICHOMONAS\", \"YEAST\", \"BACTERIA\", \"CLARITYU\", \"SPECGRAV\", \"LEUKOCYTESUR\", \"UROBILINOGEN\", \"BILIRUBINUR\", \"BLOODU\", \"GLUCOSEU\", \"KETUA\", \"AMORPHOUS\"  Urine Cultures: No results found for: \"LABURIN\"  Blood Cultures:   Lab Results   Component Value Date/Time    BC No Growth after 4 days of incubation. 07/10/2022 12:07 PM     Lab Results   Component Value Date/Time    BLOODCULT2 No Growth after 4 days of incubation. 07/10/2022 12:07 PM     Organism:   Lab Results   Component Value Date/Time    ORG BHS Group B (Strep agalacticae) 07/11/2022 01:57 PM         The patient expressed appropriate understanding of, and agreement with the discharge recommendations, medications, and plan.     Discharge condition: stable    Consults this admission:  IP CONSULT TO CARDIOLOGY  IP CONSULT TO HEART FAILURE NURSE/COORDINATOR  IP CONSULT TO DIETITIAN    Time Spent Discharging patient 33 minutes    Electronically signed by XENIA NORTON MD on 12/31/2023 at 11:27 AM

## 2023-12-31 NOTE — PROGRESS NOTES
4 Eyes Skin Assessment     NAME:  Simone Gomes  YOB: 1955  MEDICAL RECORD NUMBER:  [id number]    The patient is being assessed for  Admission    I agree that at least one RN has performed a thorough Head to Toe Skin Assessment on the patient. ALL assessment sites listed below have been assessed.      Areas assessed by both nurses:    Head, Face, Ears, Shoulders, Back, Chest, Arms, Elbows, Hands, Sacrum. Buttock, Coccyx, Ischium, Legs. Feet and Heels, and Under Medical Devices         Does the Patient have a Wound? No noted wound(s)       Alfie Prevention initiated by RN: No  Wound Care Orders initiated by RN: No    Pressure Injury (Stage 3,4, Unstageable, DTI, NWPT, and Complex wounds) if present, place Wound referral order by RN under : No    New Ostomies, if present place, Ostomy referral order under : No     Nurse 1 eSignature: Electronically signed by Quinn Barnhart RN on 12/31/23 at 3:16 AM EST    **SHARE this note so that the co-signing nurse can place an eSignature**    Nurse 2 eSignature: Electronically signed by Paige Gomez RN on 12/31/23 at 6:02 AM EST

## 2023-12-31 NOTE — PROGRESS NOTES
Pt stating he wants to leave, requesting to have IV and heart monitor removed. RN explained the risks to the patient. MD paged and requested for patient to wait for physician to come speak with him. Pt signed AMA form.

## 2023-12-31 NOTE — PROGRESS NOTES
I placed call to on-call cardiologist to alert of latest Troponin levels of 402. MD made aware, and stated patient will be seen today. No new orders at this time.

## 2023-12-31 NOTE — PROGRESS NOTES
Patient admitted to room 425 from ED. Patient oriented to room, call light, bed rails, phone, lights and bathroom.  Bed locked, in lowest position, side rails up 2/4, call light within reach. Pt voicing no further concerns at this time.

## 2024-01-01 LAB
EST. AVERAGE GLUCOSE BLD GHB EST-MCNC: 137 MG/DL
HBA1C MFR BLD: 6.4 %

## 2024-06-13 ENCOUNTER — OFFICE VISIT (OUTPATIENT)
Dept: ENT CLINIC | Age: 69
End: 2024-06-13
Payer: COMMERCIAL

## 2024-06-13 VITALS
DIASTOLIC BLOOD PRESSURE: 79 MMHG | BODY MASS INDEX: 31.07 KG/M2 | HEIGHT: 68 IN | WEIGHT: 205 LBS | SYSTOLIC BLOOD PRESSURE: 192 MMHG | HEART RATE: 56 BPM

## 2024-06-13 DIAGNOSIS — J31.2 CHRONIC SORE THROAT: Primary | ICD-10-CM

## 2024-06-13 DIAGNOSIS — F17.200 SMOKING: ICD-10-CM

## 2024-06-13 PROCEDURE — 99203 OFFICE O/P NEW LOW 30 MIN: CPT | Performed by: OTOLARYNGOLOGY

## 2024-06-13 PROCEDURE — 31575 DIAGNOSTIC LARYNGOSCOPY: CPT | Performed by: OTOLARYNGOLOGY

## 2024-06-13 PROCEDURE — 1123F ACP DISCUSS/DSCN MKR DOCD: CPT | Performed by: OTOLARYNGOLOGY

## 2024-06-13 RX ORDER — ISOSORBIDE MONONITRATE 60 MG/1
60 TABLET, EXTENDED RELEASE ORAL DAILY
COMMUNITY

## 2024-06-13 RX ORDER — BUPROPION HYDROCHLORIDE 300 MG/1
300 TABLET ORAL EVERY MORNING
COMMUNITY

## 2024-06-13 ASSESSMENT — ENCOUNTER SYMPTOMS
APNEA: 0
TROUBLE SWALLOWING: 0
SHORTNESS OF BREATH: 0
VOICE CHANGE: 0
EYE ITCHING: 0
FACIAL SWELLING: 0
SORE THROAT: 1
COUGH: 0
SINUS PRESSURE: 0

## 2024-06-13 NOTE — PROGRESS NOTES
place, and time.      Cranial Nerves: No cranial nerve deficit.      Coordination: Coordination normal.      Gait: Gait normal.   Psychiatric:         Thought Content: Thought content normal.           Procedure     Flexible Laryngoscopy    Pre op: chronic sore throat.   Post op: same,  Procedure : Flexible Laryngoscopy  Surgeon: Breana Vallejo DO  Anesthesia: Afrin with 4% lidocaine  Indication: 69-year-old male daily smoker with 6 weeks of right-sided throat soreness laryngeal mirror examination was not tolerated due to gag reflex  Description:  The scope was passed along the floor of the right naris to the level of the larynx. The base of tongue, vallecula, epiglottis, aryepiglottic folds, arytenoids, false vocal folds, true vocal folds, or pyriform sinuses were all evaluated. True vocal folds exhibited symmetric motion bilaterally without evidence of paralysis or paresis.The scope was removed. The patient tolerated the procedure without difficulty. There were no complications.  Pertinent findings: Erythema and inflammation throughout upper airway consistent with irritation secondary to smoking no mass or lesion      Assessment and Plan     1. Chronic sore throat  -Reassurance provided no evidence of mass or lesion.  Discussed potential for irritation secondary to smoking as well as untreated sleep apnea as symptoms are mainly present at night-    2. Smoking  Recommend cessation      If symptoms persist follow-up in 1 month        Breana Vallejo DO  6/13/24      Portions of this note were dictated using Dragon. There may be linguistic errors secondary to the use of this program.

## 2025-06-30 ENCOUNTER — TRANSCRIBE ORDERS (OUTPATIENT)
Dept: ADMINISTRATIVE | Age: 70
End: 2025-06-30

## 2025-06-30 DIAGNOSIS — I77.9 CAROTID ARTERY DISEASE, UNSPECIFIED LATERALITY, UNSPECIFIED TYPE: Primary | ICD-10-CM

## 2025-07-12 ENCOUNTER — HOSPITAL ENCOUNTER (OUTPATIENT)
Dept: CT IMAGING | Age: 70
Discharge: HOME | End: 2025-07-12
Payer: MEDICARE

## 2025-07-12 DIAGNOSIS — I77.9 CAROTID ARTERY DISEASE, UNSPECIFIED LATERALITY, UNSPECIFIED TYPE: ICD-10-CM

## 2025-07-12 LAB
PERFORMED ON: ABNORMAL
POC CREATININE: 0.7 MG/DL (ref 0.8–1.3)
POC SAMPLE TYPE: ABNORMAL

## 2025-07-12 PROCEDURE — 70498 CT ANGIOGRAPHY NECK: CPT

## 2025-07-12 PROCEDURE — 82565 ASSAY OF CREATININE: CPT

## 2025-07-12 PROCEDURE — 6360000004 HC RX CONTRAST MEDICATION: Performed by: INTERNAL MEDICINE

## 2025-07-12 RX ORDER — IOPAMIDOL 755 MG/ML
75 INJECTION, SOLUTION INTRAVASCULAR
Status: COMPLETED | OUTPATIENT
Start: 2025-07-12 | End: 2025-07-12

## 2025-07-12 RX ADMIN — IOPAMIDOL 75 ML: 755 INJECTION, SOLUTION INTRAVENOUS at 08:05

## (undated) DEVICE — CHLORAPREP 26ML ORANGE

## (undated) DEVICE — BANDAGE COBAN 6 IN WND 6INX5YD FOAM

## (undated) DEVICE — PINNACLE PRECISION ACCESS SYSTEM INTRODUCER SHEATH: Brand: PINNACLE PRECISION ACCESS SYSTEM

## (undated) DEVICE — SUTURE VCRL + SZ 2-0 L27IN ABSRB CLR CT-1 1/2 CIR TAPERCUT VCP259H

## (undated) DEVICE — 3M™ STERI-STRIP™ COMPOUND BENZOIN TINCTURE 40 BAGS/CARTON 4 CARTONS/CASE C1544: Brand: 3M™ STERI-STRIP™

## (undated) DEVICE — GLOW 'N TELL 55CM TAPE (20 STRIPS): Brand: VASCUTAPE RADIOPAQUE TAPE

## (undated) DEVICE — GLOVE SURG SZ 8 L11.77IN FNGR THK9.8MIL STRW LTX POLYMER

## (undated) DEVICE — SUTURE NONABSORBABLE MONOFILAMENT 6-0 BV-1 1X30 IN PROLENE 8709H

## (undated) DEVICE — 1.5MM HYDRO LEMAITRE VALVULOTOME (98 CM): Brand: HYDRO LEMAITRE VALVULOTOME

## (undated) DEVICE — SHEET,DRAPE,53X77,STERILE: Brand: MEDLINE

## (undated) DEVICE — FOGARTY - HYDRAGRIP SURGICAL - CLAMP INSERTS: Brand: FOGARTY SOFTJAW

## (undated) DEVICE — SUTURE MCRYL + SZ 4-0 L18IN ABSRB UD L19MM PS-2 3/8 CIR MCP496G

## (undated) DEVICE — SYSTEM SKIN CLSR 22CM DERMBND PRINEO

## (undated) DEVICE — SUTURE NONABSORBABLE MONOFILAMENT 6-0 C-1 1X30 IN PROLENE 8706H

## (undated) DEVICE — PICO 7 10CM X 20CM: Brand: PICO™ 7

## (undated) DEVICE — SUTURE NONABSORBABLE MONOFILAMENT 7-0 BV-1 1X24 IN PROLENE 8702H

## (undated) DEVICE — TRAY,ERASE CAUTI,URN MTR,SILI,16FR10ML: Brand: MEDLINE

## (undated) DEVICE — TOWEL,STOP FLAG GOLD N-W: Brand: MEDLINE

## (undated) DEVICE — APPLIER LIG CLP M L11IN TI STR RNG HNDL FOR 20 CLP DISP

## (undated) DEVICE — CRADLE POS PRONE 24 X 5 X 3 IN ARM N COMPR NO CVR FOAM DISP

## (undated) DEVICE — LOOP VES L406MM DIA1MM MAXI BLU SIL RADPQ DISP

## (undated) DEVICE — STAPLER EXT SKIN 35 WIDE S STL STPL SQUEEZE HNDL VISISTAT

## (undated) DEVICE — TAPE MEAS G N TALE

## (undated) DEVICE — MARKER,SKIN,WI/RULER AND LABELS: Brand: MEDLINE

## (undated) DEVICE — SUTURE ABSORBABLE BRAIDED 2-0 CT-1 27 IN UD VICRYL J259H

## (undated) DEVICE — SUTURE ETHLN SZ 4-0 L18IN NONABSORBABLE BLK L19MM PC-5 3/8 1894G

## (undated) DEVICE — CATHETER ETER IV 20GA L125IN POLYUR STR RADPQ INTROCAN SFTY

## (undated) DEVICE — DISH PETRI ST LF

## (undated) DEVICE — Device

## (undated) DEVICE — SUTURE MCRYL SZ 4-0 L18IN ABSRB UD L19MM PS-2 3/8 CIR PRIM Y496G

## (undated) DEVICE — TOWEL,OR,DSP,ST,BLUE,STD,6/PK,12PK/CS: Brand: MEDLINE

## (undated) DEVICE — SUTURE VCRL + SZ 3-0 L27IN ABSRB UD L26MM SH 1/2 CIR VCP416H

## (undated) DEVICE — GAUZE,SPONGE,4"X4",16PLY,XRAY,STRL,LF: Brand: MEDLINE

## (undated) DEVICE — SNAP KAP: Brand: UNBRANDED

## (undated) DEVICE — CHECK-FLO HEMOSTASIS ASSEMBLY: Brand: CHECK-FLO

## (undated) DEVICE — INTENDED TO BE USED TO OCCLUDE, RETRACT AND IDENTIFY ARTERIES, VEINS, TENDONS AND NERVES IN SURGICAL PROCEDURES: Brand: STERION®  VESSEL LOOP

## (undated) DEVICE — PUNCH AORT DIA4MM LNG HNDL

## (undated) DEVICE — APPLIER CLP L9.375IN APER 2.1MM CLS L3.8MM 20 SM TI CLP

## (undated) DEVICE — DRESSING FOAM W3XL3IN GENTLE SIL FACE BORD ADH PD SUP ABSRB

## (undated) DEVICE — SNAP KOVER: Brand: UNBRANDED

## (undated) DEVICE — SUTURE PERMAHAND SZ 3-0 L30IN NONABSORBABLE BLK SILK BRAID A304H

## (undated) DEVICE — PREMIUM WET SKIN PREP TRAY: Brand: MEDLINE INDUSTRIES, INC.

## (undated) DEVICE — VAC WHITEFOAM DRESSING SMALL FOAM ONLY: Brand: V.A.C.®

## (undated) DEVICE — STRAP POS W5XL72IN FOAM KNEE AND BODY HK LOOP CLSR DISP

## (undated) DEVICE — 3M™ STERI-STRIP™ REINFORCED ADHESIVE SKIN CLOSURES, R1547, 1/2 IN X 4 IN (12 MM X 100 MM), 6 STRIPS/ENVELOPE: Brand: 3M™ STERI-STRIP™

## (undated) DEVICE — DRAPE C ARM W46XL120IN XLN

## (undated) DEVICE — SUTURE PROL SZ 5-0 L36IN NONABSORBABLE BLU L13MM C-1 3/8 8720H

## (undated) DEVICE — SYRINGE ANGIO 12ML COR CTRL ROT ADPT SLD PLUNG CLR BRL M

## (undated) DEVICE — GOWN SIRUS NONREIN XL W/TWL: Brand: MEDLINE INDUSTRIES, INC.

## (undated) DEVICE — HANDPIECE SET WITH HIGH FLOW TIP AND SUCTION TUBE: Brand: INTERPULSE

## (undated) DEVICE — SOLUTION IV 1000ML 0.9% SOD CHL

## (undated) DEVICE — KIT DRSG SM INCLUDE TEGDERM DRP GRANUFOAM SENSATRAC PD W/

## (undated) DEVICE — TUBING, SUCTION, 3/16" X 12', STRAIGHT: Brand: MEDLINE

## (undated) DEVICE — LOWER EXTREMITY: Brand: MEDLINE INDUSTRIES, INC.

## (undated) DEVICE — GLOVE SURG SZ 6 L11.2IN FNGR THK9.8MIL STRW LTX POLYMER

## (undated) DEVICE — SOLUTION IV IRRIG POUR BRL 0.9% SODIUM CHL 2F7124

## (undated) DEVICE — SUTURE ETHLN SZ 3-0 L18IN NONABSORBABLE BLK L24MM PS-1 3/8 1663G

## (undated) DEVICE — GLOVE SURG SZ 65 L12IN FNGR THK79MIL GRN LTX FREE

## (undated) DEVICE — TIP SUCT STD FLNG RIG RIB 5IN1 CONN NVENT W/ SECUR GRP HNDL

## (undated) DEVICE — GEL US 20GM NONIRRITATING OVERWRAPPED FILE PCH TRNSMIT